# Patient Record
Sex: MALE | Race: BLACK OR AFRICAN AMERICAN | Employment: UNEMPLOYED | URBAN - METROPOLITAN AREA
[De-identification: names, ages, dates, MRNs, and addresses within clinical notes are randomized per-mention and may not be internally consistent; named-entity substitution may affect disease eponyms.]

---

## 2019-12-25 ENCOUNTER — HOSPITAL ENCOUNTER (INPATIENT)
Age: 29
LOS: 6 days | Discharge: HOME | DRG: 751 | End: 2019-12-31
Attending: EMERGENCY MEDICINE | Admitting: PSYCHIATRY & NEUROLOGY
Payer: MEDICAID

## 2019-12-25 PROBLEM — F32.9 DEPRESSION, MAJOR, SINGLE EPISODE: Status: ACTIVE | Noted: 2019-12-25

## 2019-12-25 PROBLEM — F33.3 SEVERE EPISODE OF RECURRENT MAJOR DEPRESSIVE DISORDER, WITH PSYCHOTIC FEATURES (HCC): Status: ACTIVE | Noted: 2019-12-25

## 2019-12-25 LAB
ACETAMINOPHEN LEVEL: <5 MCG/ML (ref 10–30)
ALBUMIN SERPL-MCNC: 3.7 G/DL (ref 3.5–5.2)
ALP BLD-CCNC: 70 U/L (ref 40–129)
ALT SERPL-CCNC: 20 U/L (ref 0–40)
AMPHETAMINE SCREEN, URINE: POSITIVE
ANION GAP SERPL CALCULATED.3IONS-SCNC: 14 MMOL/L (ref 7–16)
AST SERPL-CCNC: 21 U/L (ref 0–39)
BARBITURATE SCREEN URINE: NOT DETECTED
BASOPHILS ABSOLUTE: 0.04 E9/L (ref 0–0.2)
BASOPHILS RELATIVE PERCENT: 0.4 % (ref 0–2)
BENZODIAZEPINE SCREEN, URINE: NOT DETECTED
BILIRUB SERPL-MCNC: <0.2 MG/DL (ref 0–1.2)
BILIRUBIN URINE: NEGATIVE
BLOOD, URINE: NEGATIVE
BUN BLDV-MCNC: 8 MG/DL (ref 6–20)
CALCIUM SERPL-MCNC: 9.3 MG/DL (ref 8.6–10.2)
CANNABINOID SCREEN URINE: POSITIVE
CHLORIDE BLD-SCNC: 98 MMOL/L (ref 98–107)
CLARITY: CLEAR
CO2: 25 MMOL/L (ref 22–29)
COCAINE METABOLITE SCREEN URINE: NOT DETECTED
COLOR: YELLOW
CREAT SERPL-MCNC: 0.9 MG/DL (ref 0.7–1.2)
EOSINOPHILS ABSOLUTE: 0.2 E9/L (ref 0.05–0.5)
EOSINOPHILS RELATIVE PERCENT: 2 % (ref 0–6)
ETHANOL: <10 MG/DL (ref 0–0.08)
FENTANYL SCREEN, URINE: NOT DETECTED
GFR AFRICAN AMERICAN: >60
GFR NON-AFRICAN AMERICAN: >60 ML/MIN/1.73
GLUCOSE BLD-MCNC: 112 MG/DL (ref 74–99)
GLUCOSE URINE: NEGATIVE MG/DL
HCT VFR BLD CALC: 43.4 % (ref 37–54)
HEMOGLOBIN: 14 G/DL (ref 12.5–16.5)
IMMATURE GRANULOCYTES #: 0.06 E9/L
IMMATURE GRANULOCYTES %: 0.6 % (ref 0–5)
KETONES, URINE: NEGATIVE MG/DL
LEUKOCYTE ESTERASE, URINE: NEGATIVE
LYMPHOCYTES ABSOLUTE: 2.75 E9/L (ref 1.5–4)
LYMPHOCYTES RELATIVE PERCENT: 27 % (ref 20–42)
Lab: ABNORMAL
MCH RBC QN AUTO: 31.2 PG (ref 26–35)
MCHC RBC AUTO-ENTMCNC: 32.3 % (ref 32–34.5)
MCV RBC AUTO: 96.7 FL (ref 80–99.9)
METHADONE SCREEN, URINE: NOT DETECTED
MONOCYTES ABSOLUTE: 0.76 E9/L (ref 0.1–0.95)
MONOCYTES RELATIVE PERCENT: 7.5 % (ref 2–12)
NEUTROPHILS ABSOLUTE: 6.37 E9/L (ref 1.8–7.3)
NEUTROPHILS RELATIVE PERCENT: 62.5 % (ref 43–80)
NITRITE, URINE: NEGATIVE
OPIATE SCREEN URINE: NOT DETECTED
OXYCODONE URINE: NOT DETECTED
PDW BLD-RTO: 14 FL (ref 11.5–15)
PH UA: 7.5 (ref 5–9)
PHENCYCLIDINE SCREEN URINE: NOT DETECTED
PLATELET # BLD: 314 E9/L (ref 130–450)
PMV BLD AUTO: 9.2 FL (ref 7–12)
POTASSIUM SERPL-SCNC: 3.7 MMOL/L (ref 3.5–5)
PROTEIN UA: NEGATIVE MG/DL
RBC # BLD: 4.49 E12/L (ref 3.8–5.8)
SALICYLATE, SERUM: <0.3 MG/DL (ref 0–30)
SODIUM BLD-SCNC: 137 MMOL/L (ref 132–146)
SPECIFIC GRAVITY UA: 1.01 (ref 1–1.03)
TOTAL PROTEIN: 7.8 G/DL (ref 6.4–8.3)
TRICYCLIC ANTIDEPRESSANTS SCREEN SERUM: NEGATIVE NG/ML
UROBILINOGEN, URINE: 1 E.U./DL
WBC # BLD: 10.2 E9/L (ref 4.5–11.5)

## 2019-12-25 PROCEDURE — 36415 COLL VENOUS BLD VENIPUNCTURE: CPT

## 2019-12-25 PROCEDURE — 1240000000 HC EMOTIONAL WELLNESS R&B

## 2019-12-25 PROCEDURE — G0480 DRUG TEST DEF 1-7 CLASSES: HCPCS

## 2019-12-25 PROCEDURE — 85025 COMPLETE CBC W/AUTO DIFF WBC: CPT

## 2019-12-25 PROCEDURE — 80307 DRUG TEST PRSMV CHEM ANLYZR: CPT

## 2019-12-25 PROCEDURE — 6370000000 HC RX 637 (ALT 250 FOR IP): Performed by: PSYCHIATRY & NEUROLOGY

## 2019-12-25 PROCEDURE — 81003 URINALYSIS AUTO W/O SCOPE: CPT

## 2019-12-25 PROCEDURE — 80053 COMPREHEN METABOLIC PANEL: CPT

## 2019-12-25 PROCEDURE — 99222 1ST HOSP IP/OBS MODERATE 55: CPT | Performed by: NURSE PRACTITIONER

## 2019-12-25 PROCEDURE — 99284 EMERGENCY DEPT VISIT MOD MDM: CPT

## 2019-12-25 RX ORDER — HYDROXYZINE PAMOATE 50 MG/1
50 CAPSULE ORAL 3 TIMES DAILY PRN
Status: DISCONTINUED | OUTPATIENT
Start: 2019-12-25 | End: 2019-12-31 | Stop reason: HOSPADM

## 2019-12-25 RX ORDER — TRAZODONE HYDROCHLORIDE 50 MG/1
50 TABLET ORAL NIGHTLY PRN
Status: DISCONTINUED | OUTPATIENT
Start: 2019-12-25 | End: 2019-12-31 | Stop reason: HOSPADM

## 2019-12-25 RX ORDER — LORAZEPAM 1 MG/1
1 TABLET ORAL ONCE
Status: DISCONTINUED | OUTPATIENT
Start: 2019-12-25 | End: 2019-12-31 | Stop reason: HOSPADM

## 2019-12-25 RX ORDER — BENZTROPINE MESYLATE 1 MG/ML
2 INJECTION INTRAMUSCULAR; INTRAVENOUS 2 TIMES DAILY PRN
Status: DISCONTINUED | OUTPATIENT
Start: 2019-12-25 | End: 2019-12-31 | Stop reason: HOSPADM

## 2019-12-25 RX ORDER — NICOTINE 21 MG/24HR
1 PATCH, TRANSDERMAL 24 HOURS TRANSDERMAL DAILY
Status: DISCONTINUED | OUTPATIENT
Start: 2019-12-25 | End: 2019-12-31 | Stop reason: HOSPADM

## 2019-12-25 RX ORDER — ACETAMINOPHEN 325 MG/1
650 TABLET ORAL EVERY 4 HOURS PRN
Status: DISCONTINUED | OUTPATIENT
Start: 2019-12-25 | End: 2019-12-31 | Stop reason: HOSPADM

## 2019-12-25 RX ORDER — OLANZAPINE 5 MG/1
5 TABLET ORAL EVERY 4 HOURS PRN
Status: DISCONTINUED | OUTPATIENT
Start: 2019-12-25 | End: 2019-12-31 | Stop reason: HOSPADM

## 2019-12-25 RX ORDER — MAGNESIUM HYDROXIDE/ALUMINUM HYDROXICE/SIMETHICONE 120; 1200; 1200 MG/30ML; MG/30ML; MG/30ML
30 SUSPENSION ORAL PRN
Status: DISCONTINUED | OUTPATIENT
Start: 2019-12-25 | End: 2019-12-31 | Stop reason: HOSPADM

## 2019-12-25 RX ORDER — OLANZAPINE 10 MG/1
10 INJECTION, POWDER, LYOPHILIZED, FOR SOLUTION INTRAMUSCULAR EVERY 4 HOURS PRN
Status: DISCONTINUED | OUTPATIENT
Start: 2019-12-25 | End: 2019-12-31 | Stop reason: HOSPADM

## 2019-12-25 RX ADMIN — OLANZAPINE 5 MG: 5 TABLET, FILM COATED ORAL at 09:32

## 2019-12-25 SDOH — HEALTH STABILITY: MENTAL HEALTH: HOW OFTEN DO YOU HAVE A DRINK CONTAINING ALCOHOL?: NEVER

## 2019-12-25 ASSESSMENT — LIFESTYLE VARIABLES: HISTORY_ALCOHOL_USE: NO

## 2019-12-25 ASSESSMENT — SLEEP AND FATIGUE QUESTIONNAIRES
DIFFICULTY ARISING: NO
SLEEP PATTERN: INSOMNIA
DO YOU USE A SLEEP AID: NO
DIFFICULTY FALLING ASLEEP: YES
DO YOU HAVE DIFFICULTY SLEEPING: YES
DIFFICULTY STAYING ASLEEP: YES
RESTFUL SLEEP: NO
AVERAGE NUMBER OF SLEEP HOURS: 4

## 2019-12-25 ASSESSMENT — PAIN DESCRIPTION - LOCATION: LOCATION: FACE

## 2019-12-25 ASSESSMENT — PAIN SCALES - GENERAL: PAINLEVEL_OUTOF10: 0

## 2019-12-25 ASSESSMENT — PATIENT HEALTH QUESTIONNAIRE - PHQ9: SUM OF ALL RESPONSES TO PHQ QUESTIONS 1-9: 5

## 2019-12-25 NOTE — PLAN OF CARE
Problem: Altered Mood, Psychotic Behavior:  Goal: Able to demonstrate trust by eating, participating in treatment and following staff's direction  Description  Able to demonstrate trust by eating, participating in treatment and following staff's direction  Outcome: Ongoing  Goal: Able to verbalize decrease in frequency and intensity of hallucinations  Description  Able to verbalize decrease in frequency and intensity of hallucinations  Outcome: Ongoing  Goal: Able to verbalize reality based thinking  Description  Able to verbalize reality based thinking  Outcome: Ongoing  Goal: Absence of self-harm  Description  Absence of self-harm  Outcome: Ongoing  Goal: Ability to achieve adequate nutritional intake will improve  Description  Ability to achieve adequate nutritional intake will improve  Outcome: Ongoing  Goal: Ability to interact with others will improve  Description  Ability to interact with others will improve  Outcome: Ongoing  Goal: Compliance with prescribed medication regimen will improve  Description  Compliance with prescribed medication regimen will improve  Outcome: Ongoing  Goal: Patient specific goal  Description  Patient specific goal  Outcome: Ongoing

## 2019-12-25 NOTE — ED NOTES
Patient is accepted to 7S, room 7530 by Dr. Dequan Villarreal. Mily in admitting notified.       Cathy Saeed RN  12/25/19 0022

## 2019-12-25 NOTE — ED TRIAGE NOTES
Patient denies SI/HI, just feels like he can't move forward with his life, stated he needs someone to talk to, feeling depressed.

## 2019-12-25 NOTE — PROGRESS NOTES
Recognizing danger situations (included triggers and roadblocks)                    ( )  Coping skills (new ways to manage stress, exercise, relaxation techniques, changing routine, distraction)                                                           ( )  Basic information about quitting (benefits of quitting, techniques in how to quit, available resources  ( ) Referral for counseling faxed to [facility]                                           ( ) Patient refused counseling  ( x) Patient has not smoked in the last 30 days    Metabolic Screening:    No results found for: LABA1C    No results found for: CHOL  No results found for: TRIG  No results found for: HDL  No components found for: LDLCAL  No results found for: LABVLDL      Body mass index is 25.11 kg/m². BP Readings from Last 2 Encounters:   12/25/19 121/80           Pt admitted with followings belongings:  Dentures: None  Vision - Corrective Lenses: None  Hearing Aid: None  Jewelry: None  Body Piercings Removed: N/A     Valuables sent home with placed in locker. Valuables placed in safe in security envelope, number:  locker. Patient's home medications were placed in locked medication room. ( [id number]) Patient oriented to surroundings and program expectations and copy of patient rights given. Received admission packet:  yes. Consents reviewed, signed yes. Refused no. Patient verbalize understanding:  yes. Patient education on precautions: yes      Patient admitted from [facility] depressed and anxious, worried, tearful, and sad. Reports arriving in [place] Friday from [facility] where his mother lives to see some friends. States he went to the rescue mission and was \"not treated very well\". Patient abused meth and states \"the voices in my head tell me who to watch out for people that's around me\".  Patient is paranoid and believes his HIV is visible to others, points to the acne on his right cheek and states \"its my disease coming out on my

## 2019-12-25 NOTE — PROGRESS NOTES
[facility]  Initial Interdisciplinary Treatment Plan NOTE    Review Date & Time: 12/25/2019 1100    Patient was in treatment team    Admission Type:   Admission Type: Voluntary    Reason for admission:  Reason for Admission: \"I moved here Friday from [facility], I was treated horribly at [employer], I feel like everyone is against me, I feel sad and like giving up\"      Estimated Length of Stay Update:  3-5 days  Estimated Discharge Date Update: 12/28/2019    PATIENT STRENGTHS:  Patient Strengths Strengths: Positive Support, Communication  Patient Strengths and Limitations:Limitations: Tendency to isolate self, Lacks leisure interests, Apathetic / unmotivated, Inappropriate/potentially harmful leisure interests, Hopeless about future  Addictive Behavior:Addictive Behavior  In the past 3 months, have you felt or has someone told you that you have a problem with:  : None  Do you have a history of Chemical Use?: Yes  Do you have a history of Alcohol Use?: No  Do you have a history of Street Drug Abuse?: Yes  Histroy of Prescripton Drug Abuse?: No  Medical Problems:  Past Medical History:   Diagnosis Date    [facility]        EDUCATION:   Learner Progress Toward Treatment Goals: Reviewed group plan and strategies    Method: Small group    Outcome: Verbalized understanding    PATIENT GOALS: medication compliance and group therapy attendance    PLAN/TREATMENT RECOMMENDATIONS UPDATE:medication compliance and group therapy attendance     GOALS UPDATE:   Time frame for Short-Term Goals: 3-5 days    Ramses Ortega RN

## 2019-12-25 NOTE — ED NOTES
Bed: [facility]  Expected date:   Expected time:   Means of arrival:   Comments:  triage     Reza Porter RN  12/25/19 7455

## 2019-12-25 NOTE — PROGRESS NOTES
Neelam Nicholson was ordered [facility] which is a nonformulary medication. The patient has indicated that the home supply of this medication will be brought in to the hospital for inpatient use. If the medication has not been administered by 1400 on the following day from the time the order was placed, a pharmacist will follow-up with the nurse of the patient to assess the capability of the patient to bring in the medication. If it is determined that the patient cannot supply the medication and it is not available to be dispensed from the pharmacy, a call will be placed to the ordering provider to discuss alternative options.       Kaushal Scanlon, Leonid 12/25/2019 11:14 AM

## 2019-12-25 NOTE — H&P
Cognition:      [x] Alert  [x] Awake  [x] Oriented  [x] Person  [x] Place [x] Time      [] drowsy  [] tired  [] lethargic  [] distractable     Attention/Concentration:   [x] Attentive  [] Distracted        Memory Recent and Remote: [x] Intact   [] Impaired [] Partially Impaired     Language: [x] Able to recognize and name objects          [] Unable to recognize and name Objects    Fund of Knowledge:  [] Poor [x]  Fair  [] Good    Speech: [] Normal  [x] Soft  [] Slow  [] Fast [] Pressured            [] Loud [] Dysarthria  [] Incoherent       Appearance: [] Well Groomed  [] Casual Dressed  [] Unkept  [x] Disheveled          [x] Normal weight  [] Thin  [] Overweight  [] Obese           Attitude: [] Positive  [] Hostile  [] Demanding  [] Guarded  [] Defensive         [x] Cooperative  []  Uncooperative      Behavior:  [x] Normal Gait  [] Abnormal Gait [] Walks with Assistance  [] Alleen Ruts     [] Walks with Orpha Close  [] In Hospital Bed  [] Sitting in Chair    Muscle-Skeletal:  [x] Normal Muscle Tone [] Muscle Atrophy            [] Abnormal Muscle Movement     Eye Contact:  [x] Good eye contact  [] Intermittent Eye Contact  [] Poor Eye Contact    [] Excessive Eye Contact   [] Intrusive Eye Contact    Mood: [x] Depressed  [x] Anxious  [] Irritated  [] Euthymic   [] Angry [] Restless                   [] Apathetic    Affect:  [x] Congruent  [] Incongruent  [] Labile  [] Constricted  [x] Flat  [] Bizarre                     [] Heightened    [] Exaggerated      Thought Process and Association:  [] Logical [] Illogical       [x] Linear and Goal Directed  [] Tangential  [] Circumstantial     Thought Content:  [] Denies [x] Endorses [x] Suicidal [] Homicidal  [] Delusional      [x] Paranoid  [] Somatic  [] Grandiose    Perception: []  None  [x] Auditory   [x] Visual  [] tactile   [] olfactory  [] Illusions         Insight: [] Intact  [] Fair  [x] Limited    Judgement:  [] Intact  [] Fair  [x] Limited ASSESSMENT    Patient Active Problem List   Diagnosis    Depression, major, single episode     Recommendations and plan of treatment:  1- admit to inpatient unit  2- Unit milleiu   3- Medication Management  4- Group therapy and one on one. 5- Routine precautions    Start Depakote mood  Start Invega for psychosis    Signed:  Kt Chen  12/25/2019  7:55 AM      I saw and examined the patient and I agree with the above documentation.

## 2019-12-25 NOTE — ED PROVIDER NOTES
Department of Emergency Medicine   ED  Provider Note  Admit Date/RoomTime: 12/25/2019  2:47 AM  ED Room: [address] [place] [address]     HPI: Sandy Saeed 26 y.o. male with hx of HIV, presents with a complaint of being depressed with associated anxiety beginning a month ago. Complaint has been constant and became more severe today which is what prompted the visit. Pt states he has not been feeling like himself lately. He notes he recently moved to [facility] from [facility] and doesn't have a place to stay, no family / friends. Pt has been staying at the shelter and admits intermittent access to food / water / and adequate sleep. He reports experiencing A/V Hallucinations, voices telling him not to trust anybody. Pt admits to smoking meth earlier today. He notes he cant afford his medication, hasnt taken them in a while. Negative Suicidal ideation. Negative Homicidal ideation. No specific plan. Pt also denies fever, chills, CP, SOB, abd pain, N/V/D. Review of Systems:   Pertinent positives and negatives are stated within HPI, all other systems reviewed and are negative.    --------------------------------------------- PAST HISTORY ---------------------------------------------  Past Medical History:  has a past medical history of HIV (human immunodeficiency virus infection) ([facility] Past Surgical History:  has no past surgical history on file. Social History:      Family History: family history is not on file. The patients home medications have been reviewed. Allergies: Patient has no known allergies. -------------------------------------------------- RESULTS -------------------------------------------------  All laboratory and imaging studies were reviewed by myself.     LABS:  Results for orders placed or performed during the hospital encounter of 12/25/19   Comprehensive Metabolic Panel   Result Value Ref Range    Sodium 137 132 - 146 mmol/L    Potassium 3.7 3.5 - 5.0 mmol/L    Chloride 98 98 - 107 mmol/L CO2 25 22 - 29 mmol/L    Anion Gap 14 7 - 16 mmol/L    Glucose 112 (H) 74 - 99 mg/dL    BUN 8 6 - 20 mg/dL    CREATININE 0.9 0.7 - 1.2 mg/dL    GFR Non-African American >60 >=60 mL/min/1.73    GFR African American >60     Calcium 9.3 8.6 - 10.2 mg/dL    Total Protein 7.8 6.4 - 8.3 g/dL    Alb 3.7 3.5 - 5.2 g/dL    Total Bilirubin <0.2 0.0 - 1.2 mg/dL    Alkaline Phosphatase 70 40 - 129 U/L    ALT 20 0 - 40 U/L    AST 21 0 - 39 U/L   CBC Auto Differential   Result Value Ref Range    WBC 10.2 4.5 - 11.5 E9/L    RBC 4.49 3.80 - 5.80 E12/L    Hemoglobin 14.0 12.5 - 16.5 g/dL    Hematocrit 43.4 37.0 - 54.0 %    MCV 96.7 80.0 - 99.9 fL    MCH 31.2 26.0 - 35.0 pg    MCHC 32.3 32.0 - 34.5 %    RDW 14.0 11.5 - 15.0 fL    Platelets 775 682 - 333 E9/L    MPV 9.2 7.0 - 12.0 fL    Neutrophils % 62.5 43.0 - 80.0 %    Immature Granulocytes % 0.6 0.0 - 5.0 %    Lymphocytes % 27.0 20.0 - 42.0 %    Monocytes % 7.5 2.0 - 12.0 %    Eosinophils % 2.0 0.0 - 6.0 %    Basophils % 0.4 0.0 - 2.0 %    Neutrophils Absolute 6.37 1.80 - 7.30 E9/L    Immature Granulocytes # 0.06 E9/L    Lymphocytes Absolute 2.75 1.50 - 4.00 E9/L    Monocytes Absolute 0.76 0.10 - 0.95 E9/L    Eosinophils Absolute 0.20 0.05 - 0.50 E9/L    Basophils Absolute 0.04 0.00 - 0.20 E9/L   Serum Drug Screen   Result Value Ref Range    Ethanol Lvl <10 mg/dL    Acetaminophen Level <5.0 (L) 10.0 - 93.8 mcg/mL    Salicylate, Serum <4.3 0.0 - 30.0 mg/dL    TCA Scrn NEGATIVE Cutoff:300 ng/mL   Urine Drug Screen   Result Value Ref Range    Amphetamine Screen, Urine POSITIVE (A) Negative <1000 ng/mL    Barbiturate Screen, Ur NOT DETECTED Negative < 200 ng/mL    Benzodiazepine Screen, Urine NOT DETECTED Negative < 200 ng/mL    Cannabinoid Scrn, Ur POSITIVE (A) Negative < 50ng/mL    Cocaine Metabolite Screen, Urine NOT DETECTED Negative < 300 ng/mL    Opiate Scrn, Ur NOT DETECTED Negative < 300ng/mL    PCP Screen, Urine NOT DETECTED Negative < 25 ng/mL    Methadone Screen, Urine NOT DETECTED Negative <300 ng/mL    Oxycodone Urine NOT DETECTED Negative <100 ng/mL    FENTANYL SCREEN, URINE NOT DETECTED Negative <1 ng/mL    Drug Screen Comment: see below    Urinalysis   Result Value Ref Range    Color, UA Yellow Straw/Yellow    Clarity, UA Clear Clear    Glucose, Ur Negative Negative mg/dL    Bilirubin Urine Negative Negative    Ketones, Urine Negative Negative mg/dL    Specific Gravity, UA 1.010 1.005 - 1.030    Blood, Urine Negative Negative    pH, UA 7.5 5.0 - 9.0    Protein, UA Negative Negative mg/dL    Urobilinogen, Urine 1.0 <2.0 E.U./dL    Nitrite, Urine Negative Negative    Leukocyte Esterase, Urine Negative Negative       RADIOLOGY:  Interpreted by Radiologist.  No orders to display       ------------------------- NURSING NOTES AND VITALS REVIEWED ---------------------------   The nursing notes within the ED encounter and vital signs as below have been reviewed. /85   Pulse 91   Temp 99 °F (37.2 °C) (Infrared)   Resp 12   Ht 5' 10\" (1.778 m)   Wt 175 lb (79.4 kg)   SpO2 97%   BMI 25.11 kg/m²   Oxygen Saturation Interpretation: Normal      ---------------------------------------------------PHYSICAL EXAM--------------------------------------    Constitutional/General: Alert and oriented x3, well appearing, non toxic in NAD  Head: Normocephalic, atraumatic  Eyes: PERRL, EOMI  Mouth: Oropharynx clear, handling secretions, no trismus  Neck: Supple, full ROM, non tender to palpation in the midline, no stridor, no crepitus, no meningeal signs  Pulmonary: Lungs clear to auscultation bilaterally, no wheezes, rales, or rhonchi. Not in respiratory distress  Cardiovascular:  Regular rate and regular rhythm, no murmurs, gallops, or rubs. 2+ distal pulses  Abdomen: Soft, non tender, non distended, +BS, no rebound, guarding, or rigidity. Extremities: Moves all extremities x 4. Warm and well perfused, no clubbing, cyanosis, or edema.  Capillary refill <3 seconds  Skin: warm and dry without rash  Neurologic: GCS 15, CN 2-12 grossly intact, no focal deficits  Psych: Tearful Affect, No HI / SI. Positive A/V Hallucinations     ------------------------------------------ PROGRESS NOTES ------------------------------------------     Medical decision making:   Pt has not been feeling like himself lately. Chintan Waldropffer He reports experiencing A/V Hallucinations, voices telling him not to trust anybody. Pt admits to smoking meth earlier today. Pt has been staying at the shelter and admits intermittent access to food / water / and adequate sleep. He notes he cant afford his medication, hasnt taken them in a while. No SI / HI. Pt will be evaluated and medically cleared before consultation with social work. Consultations:   Social work        Counseling: The emergency provider has spoken with thepatient and discussed todays results, in addition to providing specific details for the plan of care and counseling regarding the diagnosis and prognosis. Questions are answered at this time and they are agreeable with the plan.     --------------------------------- IMPRESSION AND DISPOSITION ---------------------------------    IMPRESSION  1. Depression, unspecified depression type    2. Anxiety state    3. Human immunodeficiency virus (HIV) disease ([facility]    4. Methamphetamine abuse ([facility]    5. Auditory hallucinations        DISPOSITION  Disposition: as per consultation   Patient condition is stable    12/25/19, 2:54 AM.    This note is prepared by Minda Liu, acting as Scribe for Tod Loera MD.    Tod Loera MD:  The scribe's documentation has been prepared under my direction and personally reviewed by me in its entirety. I confirm that the note above accurately reflects all work, treatment, procedures, and medical decision making performed by me.      Tod Loera MD  12/25/19 [id number]

## 2019-12-25 NOTE — ED NOTES
Patient is a 26year old AA male who presented to the ED as a walk in for a psychiatric evaluation. Patient reports he has been feeling paranoid and doesn't trust anyone for some time. Patient reports he moved here on Friday from [facility] to live with 2 friends and within 24 hours, they kicked him out d/t disagreements. Patient is tearful, depressed. He reports he has no support in the area, but doesn't want to move back because he feels this area will be more helpful with his mental health. Patient denies current SI. Reports previous attempt(s), would not disclose any further details. Patient denies HI. Reports auditory hallucinations of 2 people that are always negative, telling him not to trust anyone, etc.  Patient also reports intermittent visual hallucinations of shadows, but he doesn't think twice about them because they happen quickly. Patient reports a previous admission 2 years ago. He reports a history of Depression, Anxiety and ADHD. He has a family history of Schizophrenia. He has not been on any medications for 1.5 years. He reports meth use 8 hours ago. Patient reports not sleeping for 2 days. Good appetite. Patient reports increased anxiety. He reports difficulty keeping jobs/friends and staying focused. He reports he went to [facility] on the 23rd, but only sat in the lobby and couldn't get himself to go any further. They advised him to come back on the 26th, but patient isn't confident he will be able to do it then. Patient is agreeable to inpatient services, signed voluntary.        Teressa Larose RN  12/25/19 [id number]

## 2019-12-26 LAB
CHOLESTEROL, TOTAL: 208 MG/DL (ref 0–199)
HBA1C MFR BLD: 5.6 % (ref 4–5.6)
HDLC SERPL-MCNC: 45 MG/DL
LDL CHOLESTEROL CALCULATED: 130 MG/DL (ref 0–99)
TRIGL SERPL-MCNC: 164 MG/DL (ref 0–149)
VLDLC SERPL CALC-MCNC: 33 MG/DL

## 2019-12-26 PROCEDURE — 83036 HEMOGLOBIN GLYCOSYLATED A1C: CPT

## 2019-12-26 PROCEDURE — 6370000000 HC RX 637 (ALT 250 FOR IP): Performed by: NURSE PRACTITIONER

## 2019-12-26 PROCEDURE — 99231 SBSQ HOSP IP/OBS SF/LOW 25: CPT | Performed by: NURSE PRACTITIONER

## 2019-12-26 PROCEDURE — 1240000000 HC EMOTIONAL WELLNESS R&B

## 2019-12-26 PROCEDURE — 80061 LIPID PANEL: CPT

## 2019-12-26 PROCEDURE — 36415 COLL VENOUS BLD VENIPUNCTURE: CPT

## 2019-12-26 PROCEDURE — 6370000000 HC RX 637 (ALT 250 FOR IP): Performed by: PSYCHIATRY & NEUROLOGY

## 2019-12-26 RX ORDER — DIVALPROEX SODIUM 500 MG/1
500 TABLET, DELAYED RELEASE ORAL EVERY 12 HOURS SCHEDULED
Status: DISCONTINUED | OUTPATIENT
Start: 2019-12-26 | End: 2019-12-27

## 2019-12-26 RX ORDER — PALIPERIDONE 3 MG/1
3 TABLET, EXTENDED RELEASE ORAL DAILY
Status: DISCONTINUED | OUTPATIENT
Start: 2019-12-26 | End: 2019-12-30

## 2019-12-26 RX ADMIN — HYDROXYZINE PAMOATE 50 MG: 50 CAPSULE ORAL at 18:53

## 2019-12-26 RX ADMIN — DIVALPROEX SODIUM 500 MG: 250 TABLET, DELAYED RELEASE ORAL at 20:35

## 2019-12-26 RX ADMIN — PALIPERIDONE 3 MG: 3 TABLET, EXTENDED RELEASE ORAL at 12:52

## 2019-12-26 RX ADMIN — TRAZODONE HYDROCHLORIDE 50 MG: 50 TABLET ORAL at 20:35

## 2019-12-26 RX ADMIN — DIVALPROEX SODIUM 500 MG: 250 TABLET, DELAYED RELEASE ORAL at 12:52

## 2019-12-26 ASSESSMENT — SLEEP AND FATIGUE QUESTIONNAIRES
SLEEP PATTERN: INSOMNIA
DIFFICULTY STAYING ASLEEP: YES
DO YOU HAVE DIFFICULTY SLEEPING: YES
DIFFICULTY ARISING: NO
RESTFUL SLEEP: NO
DIFFICULTY FALLING ASLEEP: YES
AVERAGE NUMBER OF SLEEP HOURS: 4
DO YOU USE A SLEEP AID: NO

## 2019-12-26 ASSESSMENT — LIFESTYLE VARIABLES: HISTORY_ALCOHOL_USE: NO

## 2019-12-26 ASSESSMENT — PAIN SCALES - GENERAL
PAINLEVEL_OUTOF10: 0
PAINLEVEL_OUTOF10: 0

## 2019-12-26 ASSESSMENT — PATIENT HEALTH QUESTIONNAIRE - PHQ9: SUM OF ALL RESPONSES TO PHQ QUESTIONS 1-9: 5

## 2019-12-26 NOTE — PROGRESS NOTES
DATE OF SERVICE:     12/26/2019    Zainab Valles seen today for the purpose of continuation of care. Nursing, social work reports, laboratory studies and vital signs are reviewed. Patient chief complaint today is:             [x] Depression      [x] Anxiety        [] Psychosis         [] Suicidal/Homicidal                         [] Delusions           [] Aggression          Subjective: Today patient states that his moods are labile. Sleep:  [] Good [x] Fair  [] Poor  Appetite:  [] Good [x] Fair  [] Poor    Depression:  [] Mild [] Moderate [x] Severe                [x] Constant [] Sporadic     Anxiety: [] Mild [] Moderate [x] Severe    [x] Constant [] Sporadic     Delusions: [] Mild [] Moderate [] Severe     [] Constant [] Sporadic     [] Paranoid [] Somatic [] Grandiose     Hallucinations: [] Mild [] Moderate [] Severe     [] Constant [] Sporadic    [] Auditory  [] Visual [] Tactile       Suicidal: [] Constant [] Sporadic  Homicidal: [] Constant [] Sporadic    Unscheduled Medications     [] Patient Receiving Emergency Medications \" Chemical Restraint\"   [] Requesting PRN medications for anxiety    Medical Review of Systems:     All other than marked systmes have been reviewed and are all negative.     Constitutional Symptoms: []  fever []  Chills  Skin Symptoms: [] rash []  Pruritus   Eye Symptoms: [] Vision unchanged []  recent vision problems[] blurred vision   Respiratory Symptoms:[] shortness of breath [] cough  Cardiovascular Symptoms:  [] chest pain   [] palpitations   Gastrointestinal Symptoms: []  abdominal pain []  nausea []  vomiting []  diarrhea  Genitourinary Symptoms: []  dysuria  []  hematuria   Musculoskeletal Symptoms: []  back pain []  muscle pain []  joint pain  Neurologic Symptoms: []  headache []  dizziness  Hematolymphoid Symptoms: [] Adenopathy [] Bruises   [] Schimosis       Psychiatric Review of systems  Delusions:  [] Denies [] Endorses   Withdrawals:  [] Denies [] Endorses Hallucinations: [] Denies [] Endorses    Extra Pyramidal Symptoms: [] Denies [] Endorses      /61   Pulse 60   Temp 97.8 °F (36.6 °C) (Oral)   Resp 14   Ht 5' 10\" (1.778 m)   Wt 175 lb (79.4 kg)   SpO2 98%   BMI 25.11 kg/m²     Mental Status Examination:    Cognition:      [x] Alert  [x] Awake  [x] Oriented  [x] Person  [x] Place [x] Time      [] drowsy  [] tired  [] lethargic  [] distractable  [] Other    Attention/Concentration:   [x] Attentive  [] Distracted        Memory Recent and Remote: [x] Intact   [] Impaired [] Partially Impaired     Language: [x] Able to recognize and name objects          [] Unable to recognize and name Objects    Fund of Knowledge:  [] Poor [x]  Fair  [] Good    Speech: [] Normal  [x] Soft  [] Slow  [] Fast [] Pressured            [] Loud [] Dysarthria  [] Incoherent    Appearance: [] Well Groomed  [x] Casual Dressed  [] Unkept  [] Disheveled          [x] Normal weight[] Thin  [] Overweight  [] Obese           Attitude: [] Positive  [] Hostile  [] Demanding  [] Guarded  [] Defensive         [x] Cooperative  []  Uncooperative      Behavior:  [x] Normal Gait  [] Walks with Assistance  [] Terri Chair     [] Walks with [address]  [] In Hospital Bed  [] Sitting in Chair    Muscle-Skeletal:  [x] Normal Muscle Tone [] Muscle Atrophy       [] Abnormal Muscle Movement     Eye Contact:  [x] Good eye contact  [] Intermittent Eye Contact  [] Poor Eye Contact        [] Excessive Eye Contact   [] Intrusive    Mood: [x] Depressed  [x] Anxious  [] Irritated  [] Euthymic   [] Angry [] Restless                    [] Apathetic    Affect:  [x] Congruent  [] Incongruent  [x] Labile  [] Constricted  [x] Flat  [] Bizarre                     [] Heightened  [] Exaggerated      Thought Process and Association:  [] Logical [] Illogical       [x] Linear and Goal Directed  [] Tangential  [x] Circumstantial     Thought Content:  [x] Denies [] Endorses [] Suicidal [] Homicidal  [] Delusional      []

## 2019-12-26 NOTE — PLAN OF CARE
Patient denies SI, HI and hallucinations. Reports that he is no longer hearing voices. Presents calm and cooperative. Isolates to room at times. Medications taken without issue. No complaints or concerns voiced at this time. No unit problems reported. Will continue to observe and support.      Problem: Altered Mood, Psychotic Behavior:  Goal: Able to demonstrate trust by eating, participating in treatment and following staff's direction  Description  Able to demonstrate trust by eating, participating in treatment and following staff's direction  Outcome: Met This Shift  Goal: Able to verbalize decrease in frequency and intensity of hallucinations  Description  Able to verbalize decrease in frequency and intensity of hallucinations  Outcome: Met This Shift

## 2019-12-27 PROCEDURE — 1240000000 HC EMOTIONAL WELLNESS R&B

## 2019-12-27 PROCEDURE — 6370000000 HC RX 637 (ALT 250 FOR IP): Performed by: NURSE PRACTITIONER

## 2019-12-27 PROCEDURE — 99232 SBSQ HOSP IP/OBS MODERATE 35: CPT | Performed by: NURSE PRACTITIONER

## 2019-12-27 RX ORDER — DIVALPROEX SODIUM 500 MG/1
1000 TABLET, DELAYED RELEASE ORAL NIGHTLY
Status: DISCONTINUED | OUTPATIENT
Start: 2019-12-27 | End: 2019-12-30

## 2019-12-27 RX ADMIN — DIVALPROEX SODIUM 500 MG: 250 TABLET, DELAYED RELEASE ORAL at 09:31

## 2019-12-27 RX ADMIN — DIVALPROEX SODIUM 1000 MG: 500 TABLET, DELAYED RELEASE ORAL at 20:45

## 2019-12-27 RX ADMIN — PALIPERIDONE 3 MG: 3 TABLET, EXTENDED RELEASE ORAL at 09:31

## 2019-12-27 ASSESSMENT — PAIN SCALES - GENERAL: PAINLEVEL_OUTOF10: 0

## 2019-12-27 NOTE — PLAN OF CARE
Problem: Altered Mood, Psychotic Behavior:  Goal: Able to verbalize decrease in frequency and intensity of hallucinations  Description  Able to verbalize decrease in frequency and intensity of hallucinations  Outcome: Met This Shift     Problem: Altered Mood, Psychotic Behavior:  Goal: Able to verbalize reality based thinking  Description  Able to verbalize reality based thinking  Outcome: Ongoing  Goal: Ability to achieve adequate nutritional intake will improve  Description  Ability to achieve adequate nutritional intake will improve  Outcome: Ongoing     Tristane denies any SI, HI, or any hallucinations. Groups offered and encouraged.

## 2019-12-27 NOTE — PROGRESS NOTES
Symptoms: [] Adenopathy [] Bruises   [] Schimosis       Psychiatric Review of systems  Delusions:  [] Denies [] Endorses   Withdrawals:  [] Denies [] Endorses    Hallucinations: [] Denies [] Endorses    Extra Pyramidal Symptoms: [] Denies [] Endorses      BP (!) 103/57   Pulse 76   Temp 98.2 °F (36.8 °C) (Temporal)   Resp 15   Ht 5' 10\" (1.778 m)   Wt 175 lb (79.4 kg)   SpO2 98%   BMI 25.11 kg/m²     Mental Status Examination:    Cognition:      [x] Alert  [x] Awake  [x] Oriented  [x] Person  [x] Place [x] Time      [] drowsy  [] tired  [] lethargic  [] distractable  [] Other    Attention/Concentration:   [x] Attentive  [] Distracted        Memory Recent and Remote: [x] Intact   [] Impaired [] Partially Impaired     Language: [x] Able to recognize and name objects          [] Unable to recognize and name Objects    Fund of Knowledge:  [] Poor [x]  Fair  [] Good    Speech: [x] Normal  [] Soft  [] Slow  [] Fast [] Pressured            [] Loud [] Dysarthria  [] Incoherent    Appearance: [] Well Groomed  [] Casual Dressed  [] Unkept  [] Disheveled          [x] Normal weight[] Thin  [] Overweight  [] Obese           Attitude: [] Positive  [] Hostile  [] Demanding  [] Guarded  [] Defensive         [x] Cooperative  []  Uncooperative      Behavior:  [x] Normal Gait  [] Walks with Assistance  [] Terri Chair     [] Walks with Zabrina English  [] In Hospital Bed  [] Sitting in Chair    Muscle-Skeletal:  [x] Normal Muscle Tone [] Muscle Atrophy       [] Abnormal Muscle Movement     Eye Contact:  [x] Good eye contact  [] Intermittent Eye Contact  [] Poor Eye Contact        [] Excessive Eye Contact   [] Intrusive    Mood: [] Depressed  [] Anxious  [] Irritated  [x] Euthymic   [] Angry [] Restless                    [] Apathetic    Affect:  [x] Congruent  [] Incongruent  [] Labile  [] Constricted  [] Flat  [x] Bizarre                     [] Heightened  [] Exaggerated      Thought Process and Association:  [] Logical [] Illogical

## 2019-12-27 NOTE — PLAN OF CARE
Patient denies SI, HI and hallucinations. Reports that he is feeling better. Presents calm and cooperative. Patient is out on unit and is social with peers at times. Medications taken without issue. No complaints or concerns voiced at this time. No unit problems reported. Will continue to observe and support.      Problem: Altered Mood, Psychotic Behavior:  Goal: Able to verbalize decrease in frequency and intensity of hallucinations  Description  Able to verbalize decrease in frequency and intensity of hallucinations  Outcome: Ongoing  Goal: Able to verbalize reality based thinking  Description  Able to verbalize reality based thinking  Outcome: Ongoing

## 2019-12-28 LAB — VALPROIC ACID LEVEL: 60 MCG/ML (ref 50–100)

## 2019-12-28 PROCEDURE — 80164 ASSAY DIPROPYLACETIC ACD TOT: CPT

## 2019-12-28 PROCEDURE — 6370000000 HC RX 637 (ALT 250 FOR IP): Performed by: PSYCHIATRY & NEUROLOGY

## 2019-12-28 PROCEDURE — 1240000000 HC EMOTIONAL WELLNESS R&B

## 2019-12-28 PROCEDURE — 6370000000 HC RX 637 (ALT 250 FOR IP): Performed by: NURSE PRACTITIONER

## 2019-12-28 PROCEDURE — 36415 COLL VENOUS BLD VENIPUNCTURE: CPT

## 2019-12-28 PROCEDURE — 99232 SBSQ HOSP IP/OBS MODERATE 35: CPT | Performed by: NURSE PRACTITIONER

## 2019-12-28 RX ADMIN — PALIPERIDONE 3 MG: 3 TABLET, EXTENDED RELEASE ORAL at 09:02

## 2019-12-28 RX ADMIN — TRAZODONE HYDROCHLORIDE 50 MG: 50 TABLET ORAL at 20:40

## 2019-12-28 RX ADMIN — HYDROXYZINE PAMOATE 50 MG: 50 CAPSULE ORAL at 20:40

## 2019-12-28 RX ADMIN — DIVALPROEX SODIUM 1000 MG: 500 TABLET, DELAYED RELEASE ORAL at 20:39

## 2019-12-28 ASSESSMENT — PAIN SCALES - GENERAL
PAINLEVEL_OUTOF10: 0

## 2019-12-28 NOTE — PROGRESS NOTES
DATE OF SERVICE:     12/28/2019    Darinel Whitten seen today for the purpose of continuation of care. Nursing, social work reports, laboratory studies and vital signs are reviewed. Patient chief complaint today is:             [x] Depression      [x] Anxiety        [] Psychosis         [] Suicidal/Homicidal                         [] Delusions           [] Aggression          Subjective: Today patient states that he slept well. States that he is not hearing voices or having any paranoia. States moods are more even and restlessness is better. Wants rehab med compliant but no groups. Sleep:  [x] Good [] Fair  [] Poor  Appetite:  [x] Good [] Fair  [] Poor    Depression:  [] Mild [x] Moderate [] Severe                [x] Constant [] Sporadic     Anxiety: [] Mild [x] Moderate [] Severe    [x] Constant [] Sporadic     Delusions: [] Mild [] Moderate [] Severe     [] Constant [] Sporadic     [] Paranoid [] Somatic [] Grandiose     Hallucinations: [] Mild [] Moderate [] Severe     [] Constant [] Sporadic    [] Auditory  [] Visual [] Tactile       Suicidal: [] Constant [] Sporadic  Homicidal: [] Constant [] Sporadic    Unscheduled Medications     [] Patient Receiving Emergency Medications \" Chemical Restraint\"   [] Requesting PRN medications for anxiety    Medical Review of Systems:     All other than marked systmes have been reviewed and are all negative.     Constitutional Symptoms: []  fever []  Chills  Skin Symptoms: [] rash []  Pruritus   Eye Symptoms: [] Vision unchanged []  recent vision problems[] blurred vision   Respiratory Symptoms:[] shortness of breath [] cough  Cardiovascular Symptoms:  [] chest pain   [] palpitations   Gastrointestinal Symptoms: []  abdominal pain []  nausea []  vomiting []  diarrhea  Genitourinary Symptoms: []  dysuria  []  hematuria   Musculoskeletal Symptoms: []  back pain []  muscle pain []  joint pain  Neurologic Symptoms: []  headache []  dizziness  Hematolymphoid Symptoms: Goal Directed  [] Tangential  [] Circumstantial     Thought Content:  [x] Denies [] Endorses [] Suicidal [] Homicidal  [] Delusional      [] Paranoid  [] Somatic  [] Grandiose    Perception: [x]  None  [] Auditory   [] Visual  [] tactile   [] olfactory  [] Illusions         Insight: [] Intact  [] Fair  [x] Limited    Judgement:  [] Intact  [] Fair  [x] Limited      Assessment/Plan:        Patient Active Problem List   Diagnosis Code    Depression, major, single episode F32.9    Severe episode of recurrent major depressive disorder, with psychotic features ([facility] [address] F33.3         Plan:    []  Patient is refusing medications  [x] Improving as expected   [] Not improving as expected   [] Worsening    []  At Baseline     Continue current treatment  Depakote level in AM      Reason for more than one antipsychotic:  [x] N/A  [] 3 failed monotherapy(drugs tried):  [] Cross over to a new antipsychotic  [] Taper to monotherapy from polypharmacy  [] Augmentation of Clozapine therapy due to treatment resistance to single therapy      Signed:  Tanya Toscano  12/28/2019  11:20 AM

## 2019-12-28 NOTE — GROUP NOTE
Group Therapy Note    Date: 12/28/2019    Group Start Time: 1115  Group End Time: 5390  Group Topic: Cognitive Skills    [facility]    ANALILIA Morin        Group Therapy Note    Attendees: 14         Patient's Goal:  Pt will be able to identify coping skills to use upon d/c. Notes:  Pt participated in class discussion and activity. Status After Intervention:  Improved    Participation Level:  Active Listener and Interactive    Participation Quality: Appropriate, Attentive, Sharing and Supportive      Speech:  normal      Thought Process/Content: Logical      Affective Functioning: Congruent      Mood: euthymic      Level of consciousness:  Alert, Oriented x4 and Attentive      Response to Learning: Able to verbalize current knowledge/experience, Able to verbalize/acknowledge new learning and Progressing to goal      Endings: None Reported    Modes of Intervention: Education, Support, Socialization and Problem-solving      Discipline Responsible: /Counselor      Signature:  ANALILIA lFores

## 2019-12-29 PROCEDURE — 99232 SBSQ HOSP IP/OBS MODERATE 35: CPT | Performed by: NURSE PRACTITIONER

## 2019-12-29 PROCEDURE — 6370000000 HC RX 637 (ALT 250 FOR IP): Performed by: PSYCHIATRY & NEUROLOGY

## 2019-12-29 PROCEDURE — 6370000000 HC RX 637 (ALT 250 FOR IP): Performed by: NURSE PRACTITIONER

## 2019-12-29 PROCEDURE — 1240000000 HC EMOTIONAL WELLNESS R&B

## 2019-12-29 RX ADMIN — DIVALPROEX SODIUM 1000 MG: 500 TABLET, DELAYED RELEASE ORAL at 21:29

## 2019-12-29 RX ADMIN — ACETAMINOPHEN 650 MG: 325 TABLET, FILM COATED ORAL at 09:24

## 2019-12-29 RX ADMIN — TRAZODONE HYDROCHLORIDE 50 MG: 50 TABLET ORAL at 21:29

## 2019-12-29 RX ADMIN — PALIPERIDONE 3 MG: 3 TABLET, EXTENDED RELEASE ORAL at 08:26

## 2019-12-29 RX ADMIN — HYDROXYZINE PAMOATE 50 MG: 50 CAPSULE ORAL at 18:48

## 2019-12-29 ASSESSMENT — PAIN SCALES - GENERAL
PAINLEVEL_OUTOF10: 0
PAINLEVEL_OUTOF10: 7

## 2019-12-29 NOTE — PLAN OF CARE
Patient reports he feels \"much better than when I came in\". He reports being tired more than normal, he is aware this could be a temporary effect of the medications. Patient has been \"catching up\" on his sleep throughout the day. He reports his day has been \"real good\", he is hopeful of discharge tomorrow. Patient is active on the unit; bright, friendly and sociable with staff and peers. Calm and cooperative with staff and care, patient is taking prescribed medications, is attending groups, and reports no disturbance to appetite. Patient is appropriately groomed and attired; gait is steady and patient is independent with ADLs. Denies thoughts or intent to harm self or others at this time. Denies audio or visual hallucinations at this time. Reports no physical or emotional concerns or complaints, no signs or symptoms of distress or discomfort. Able to maintain control of behaviors and emotions. Patient is encouraged to continue to work towards discharge goal by complying with medications, attending groups and to seek staff if feelings are overwhelming. Environmental rounds completed per unit policy to maintain safety of everyone on the unit. Staff will offer support and interventions as requested or required. Problem: Altered Mood, Psychotic Behavior:  Goal: Able to demonstrate trust by eating, participating in treatment and following staff's direction  Description  Able to demonstrate trust by eating, participating in treatment and following staff's direction  Outcome: Met This Shift  Goal: Able to verbalize reality based thinking  Description  Able to verbalize reality based thinking  Outcome: Met This Shift  Goal: Ability to interact with others will improve  Description  Ability to interact with others will improve  Outcome: Met This Shift  Goal: Compliance with prescribed medication regimen will improve  Description  Compliance with prescribed medication regimen will improve  Outcome:  Met

## 2019-12-29 NOTE — PROGRESS NOTES
headache []  dizziness  Hematolymphoid Symptoms: [] Adenopathy [] Bruises   [] Schimosis       Psychiatric Review of systems  Delusions:  [] Denies [] Endorses   Withdrawals:  [] Denies [] Endorses    Hallucinations: [] Denies [] Endorses    Extra Pyramidal Symptoms: [] Denies [] Endorses      /60   Pulse 79   Temp 98.1 °F (36.7 °C) (Oral)   Resp 12   Ht 5' 10\" (1.778 m)   Wt 175 lb (79.4 kg)   SpO2 98%   BMI 25.11 kg/m²     Mental Status Examination:    Cognition:      [x] Alert  [x] Awake  [x] Oriented  [x] Person  [x] Place [x] Time      [] drowsy  [] tired  [] lethargic  [] distractable  [] Other    Attention/Concentration:   [x] Attentive  [] Distracted        Memory Recent and Remote: [x] Intact   [] Impaired [] Partially Impaired     Language: [x] Able to recognize and name objects          [] Unable to recognize and name Objects    Fund of Knowledge:  [] Poor [x]  Fair  [] Good    Speech: [x] Normal  [] Soft  [] Slow  [] Fast [] Pressured            [] Loud [] Dysarthria  [] Incoherent    Appearance: [] Well Groomed  [x] Casual Dressed  [] Unkept  [] Disheveled          [x] Normal weight[] Thin  [] Overweight  [] Obese           Attitude: [] Positive  [] Hostile  [] Demanding  [] Guarded  [] Defensive         [x] Cooperative  []  Uncooperative   0 Behavior:  [x] Normal Gait  [] Walks with Assistance  [] Terri Chair     [] Walks with Zabrina Mellrodney  [] In Hospital Bed  [] Sitting in Chair    Muscle-Skeletal:  [x] Normal Muscle Tone [] Muscle Atrophy       [] Abnormal Muscle Movement     Eye Contact:  [x] Good eye contact  [] Intermittent Eye Contact  [] Poor Eye Contact        [] Excessive Eye Contact   [] Intrusive    Mood: [] Depressed  [] Anxious  [] Irritated  [x] Euthymic   [] Angry [] Restless                    [] Apathetic    Affect:  [x] Congruent  [] Incongruent  [] Labile  [] Constricted  [] Flat  [] Bizarre                     [] Heightened  [] Exaggerated      Thought Process and Association: [] Logical [] Illogical       [x] Linear and Goal Directed  [] Tangential  [] Circumstantial     Thought Content:  [x] Denies [] Endorses [] Suicidal [] Homicidal  [] Delusional      [] Paranoid  [] Somatic  [] Grandiose    Perception: [x]  None  [] Auditory   [] Visual  [] tactile   [] olfactory  [] Illusions         Insight: [] Intact  [] Fair  [x] Limited    Judgement:  [] Intact  [] Fair  [x] Limited      Assessment/Plan:        Patient Active Problem List   Diagnosis Code    Depression, major, single episode F32.9    Severe episode of recurrent major depressive disorder, with psychotic features ([facility] [address] F33.3         Plan:    []  Patient is refusing medications  [x] Improving as expected   [] Not improving as expected   [] Worsening    []  At Baseline     Continue current treatment  Educate on risks of drugs and worsening mental illness and death    Reason for more than one antipsychotic:  [x] N/A  [] 3 failed monotherapy(drugs tried):  [] Cross over to a new antipsychotic  [] Taper to monotherapy from polypharmacy  [] Augmentation of Clozapine therapy due to treatment resistance to single therapy      Signed:  Yasmin Toscano  12/29/2019  10:21 AM

## 2019-12-30 PROCEDURE — 1240000000 HC EMOTIONAL WELLNESS R&B

## 2019-12-30 PROCEDURE — 6370000000 HC RX 637 (ALT 250 FOR IP): Performed by: NURSE PRACTITIONER

## 2019-12-30 PROCEDURE — 99232 SBSQ HOSP IP/OBS MODERATE 35: CPT | Performed by: NURSE PRACTITIONER

## 2019-12-30 PROCEDURE — 6370000000 HC RX 637 (ALT 250 FOR IP): Performed by: PSYCHIATRY & NEUROLOGY

## 2019-12-30 RX ORDER — DIVALPROEX SODIUM 500 MG/1
1500 TABLET, EXTENDED RELEASE ORAL DAILY
Status: DISCONTINUED | OUTPATIENT
Start: 2019-12-30 | End: 2019-12-31 | Stop reason: HOSPADM

## 2019-12-30 RX ORDER — PALIPERIDONE 3 MG/1
3 TABLET, EXTENDED RELEASE ORAL ONCE
Status: COMPLETED | OUTPATIENT
Start: 2019-12-30 | End: 2019-12-30

## 2019-12-30 RX ORDER — PALIPERIDONE 6 MG/1
6 TABLET, EXTENDED RELEASE ORAL DAILY
Status: DISCONTINUED | OUTPATIENT
Start: 2019-12-31 | End: 2019-12-31 | Stop reason: HOSPADM

## 2019-12-30 RX ADMIN — PALIPERIDONE 3 MG: 3 TABLET, EXTENDED RELEASE ORAL at 09:09

## 2019-12-30 RX ADMIN — TRAZODONE HYDROCHLORIDE 50 MG: 50 TABLET ORAL at 20:48

## 2019-12-30 RX ADMIN — DIVALPROEX SODIUM 1500 MG: 500 TABLET, EXTENDED RELEASE ORAL at 11:12

## 2019-12-30 RX ADMIN — PALIPERIDONE 3 MG: 3 TABLET, EXTENDED RELEASE ORAL at 11:13

## 2019-12-30 ASSESSMENT — PAIN SCALES - GENERAL
PAINLEVEL_OUTOF10: 0

## 2019-12-30 NOTE — GROUP NOTE
Group Therapy Note    Date: 12/30/2019    Group Start Time: 1115  Group End Time: 1200  Group Topic: Psychotherapy    [facility]    ANALILIA Morin        Group Therapy Note    Attendees: 9         Patient's Goal:  To express feelings and engage in healthy interactions to manage mood and symptoms and develop insights into ways to improve interaction and communication with others. Notes:  Pt participated actively in group discussing personal issues. Pt made positive appropriate connections with others by providing positive feedback and support. Status After Intervention:  Improved    Participation Level:  Active Listener and Interactive    Participation Quality: Appropriate, Attentive, Sharing and Supportive      Speech:  normal      Thought Process/Content: Logical      Affective Functioning: Blunted      Mood: depressed      Level of consciousness:  Alert, Oriented x4 and Attentive      Response to Learning: Able to verbalize current knowledge/experience, Able to verbalize/acknowledge new learning and Progressing to goal      Endings: None Reported    Modes of Intervention: Support, Socialization, Exploration and Problem-solving      Discipline Responsible: /Counselor      Signature:  ANALILIA Ortiz

## 2019-12-30 NOTE — PLAN OF CARE
Problem: Altered Mood, Psychotic Behavior:  Goal: Able to verbalize decrease in frequency and intensity of hallucinations  Description  Able to verbalize decrease in frequency and intensity of hallucinations  Outcome: Ongoing  Goal: Able to verbalize reality based thinking  Description  Able to verbalize reality based thinking  12/30/2019 1059 by Victorino Lee RN  Outcome: Ongoing  12/30/2019 0543 by Von Aschoff, RN  Outcome: Ongoing   pt denies si/hi and hallucinations. Pt has rapid pressured speech with flight of ideas. Pt attends groups and takes meds.

## 2019-12-30 NOTE — CARE COORDINATION
NAZIA Note: d/c planning; NAZIA met with pt during treatment team. He would like referred to [facility] for housing. SW called and left message at [phone]. Pt plans to return first to the [place] [address] until he can get into housing program.  He also follows at [facility] and has appt on 1/06 at 10 am.    NAZIA called [facility] [phone] option 4: Omar Carlson.

## 2019-12-30 NOTE — PROGRESS NOTES
Grandiose    Perception: [x]  None  [] Auditory   [] Visual  [] tactile   [] olfactory  [] Illusions         Insight: [] Intact  [] Fair  [x] Limited    Judgement:  [] Intact  [] Fair  [x] Limited      Assessment/Plan:        Patient Active Problem List   Diagnosis Code    Depression, major, single episode F32.9    Severe episode of recurrent major depressive disorder, with psychotic features ([facility] [address] F33.3         Plan:    []  Patient is refusing medications  [x] Improving as expected   [] Not improving as expected   [] Worsening    []  At Baseline     Will increase Invega to 6 mg and depakote to 1500 mg    Reason for more than one antipsychotic:  [x] N/A  [] 3 failed monotherapy(drugs tried):  [] Cross over to a new antipsychotic  [] Taper to monotherapy from polypharmacy  [] Augmentation of Clozapine therapy due to treatment resistance to single therapy      Signed:  Cindy Felix  12/30/2019  10:07 AM

## 2019-12-31 VITALS
HEART RATE: 80 BPM | OXYGEN SATURATION: 98 % | TEMPERATURE: 97.9 F | SYSTOLIC BLOOD PRESSURE: 108 MMHG | HEIGHT: 70 IN | RESPIRATION RATE: 12 BRPM | DIASTOLIC BLOOD PRESSURE: 58 MMHG | WEIGHT: 175 LBS | BODY MASS INDEX: 25.05 KG/M2

## 2019-12-31 PROCEDURE — 6370000000 HC RX 637 (ALT 250 FOR IP): Performed by: PSYCHIATRY & NEUROLOGY

## 2019-12-31 PROCEDURE — 99238 HOSP IP/OBS DSCHRG MGMT 30/<: CPT | Performed by: NURSE PRACTITIONER

## 2019-12-31 RX ORDER — NICOTINE 21 MG/24HR
1 PATCH, TRANSDERMAL 24 HOURS TRANSDERMAL DAILY
Qty: 30 PATCH | Refills: 0 | Status: SHIPPED | OUTPATIENT
Start: 2019-12-31 | End: 2020-06-04

## 2019-12-31 RX ORDER — DIVALPROEX SODIUM 500 MG/1
1500 TABLET, EXTENDED RELEASE ORAL DAILY
Qty: 90 TABLET | Refills: 0 | Status: SHIPPED | OUTPATIENT
Start: 2020-01-01 | End: 2020-06-04

## 2019-12-31 RX ORDER — RISPERIDONE 2 MG/1
2 TABLET, FILM COATED ORAL 2 TIMES DAILY
Qty: 60 TABLET | Refills: 0 | Status: SHIPPED | OUTPATIENT
Start: 2019-12-31 | End: 2020-06-04

## 2019-12-31 RX ORDER — PALIPERIDONE 6 MG/1
6 TABLET, EXTENDED RELEASE ORAL DAILY
Qty: 30 TABLET | Refills: 0 | Status: SHIPPED | OUTPATIENT
Start: 2020-01-01 | End: 2019-12-31 | Stop reason: HOSPADM

## 2019-12-31 RX ORDER — RISPERIDONE 2 MG/1
2 TABLET, FILM COATED ORAL 2 TIMES DAILY
Qty: 60 TABLET | Refills: 3 | Status: SHIPPED | OUTPATIENT
Start: 2019-12-31 | End: 2019-12-31 | Stop reason: SDUPTHER

## 2019-12-31 RX ADMIN — DIVALPROEX SODIUM 1500 MG: 500 TABLET, EXTENDED RELEASE ORAL at 08:31

## 2019-12-31 RX ADMIN — HYDROXYZINE PAMOATE 50 MG: 50 CAPSULE ORAL at 01:27

## 2019-12-31 RX ADMIN — ALUMINUM HYDROXIDE, MAGNESIUM HYDROXIDE, AND SIMETHICONE 30 ML: 200; 200; 20 SUSPENSION ORAL at 01:29

## 2019-12-31 RX ADMIN — PALIPERIDONE 6 MG: 6 TABLET, EXTENDED RELEASE ORAL at 08:31

## 2019-12-31 ASSESSMENT — PAIN SCALES - GENERAL: PAINLEVEL_OUTOF10: 0

## 2019-12-31 NOTE — PROGRESS NOTES
[facility]  Discharge Note    Pt discharged with followings belongings:   Dentures: None  Vision - Corrective Lenses: None  Hearing Aid: None  Jewelry: None  Body Piercings Removed: N/A  Clothing: Footwear, Jacket / coat, Pants, Shirt, Sweater, Socks, Undergarments (Comment)  Were All Patient Medications Collected?: Not Applicable  Other Valuables: Cell phone, Wallet(bag, , headphones, special hyg products)   Valuables  returned to patient. Patient education on aftercare instructions:   Patient verbalize understanding of AVS:  .    Status EXAM upon discharge:  Status and Exam  Normal: Yes  Facial Expression: Brightened  Affect: Appropriate  Level of Consciousness: Alert  Mood:Normal: No  Mood: Anxious  Motor Activity:Normal: Yes  Motor Activity: Agitated  Interview Behavior: Cooperative  Preception: [place] to Person, Linda Maha to Time, [place] to Place, [place] to Situation  Attention:Normal: No  Attention: Distractible  Thought Processes: Circumstantial, Flt.of Ideas, Loose Assoc. Thought Content:Normal: No  Thought Content: Preoccupations  Hallucinations: None  Delusions: Yes  Delusions:  Other(See Comment)  Memory:Normal: Yes  Memory: Poor Recent  Insight and Judgment: Yes  Insight and Judgment: (improving)  Present Suicidal Ideation: No  Present Homicidal Ideation: No      Metabolic Screening:    Lab Results   Component Value Date    LABA1C 5.6 12/26/2019       Lab Results   Component Value Date    CHOL 208 (H) 12/26/2019     Lab Results   Component Value Date    TRIG 164 (H) 12/26/2019     Lab Results   Component Value Date    HDL 45 12/26/2019     No components found for: [facility] [place]  Lab Results   Component Value Date    LABVLDL 33 12/26/2019       Leticia Strong RN

## 2019-12-31 NOTE — DISCHARGE SUMMARY
Intermittent Eye Contact  [] Poor Eye Contact     Mood: [] Depressed  [] Anxious  [] Irritated  [x] Euthymic   [] Angry [] Restless    Affect:  [x] Congruent  [] Incongruent  [] Labile  [] Constricted  [] Flat  [] Bizarre     Thought Process and Association:  [] Logical [] Illogical       [x] Linear and Goal Directed  [] Tangential  [] Circumstantial     Thought Content:  [x] Denies [] Endorses [] Suicidal [] Homicidal  [] Delusional      [] Paranoid  [] Somatic  [] Grandiose    Perception: [x]  None  [] Auditory   [] Visual  [] tactile   [] olfactory  [] Illusions         Insight: [] Intact  [x] Fair  [] Limited    Judgement:  [] Intact  [x] Fair  [] Limited    Hospital Course:   Admit Date: 12/25/2019     Discharge Date: 12/31/2019  Admitted from:  [x]  Emergency Room  []  Home  []  Another facility   []  [facility]     Admitting diagnosis:   Patient Active Problem List   Diagnosis    Depression, major, single episode    Severe episode of recurrent major depressive disorder, with psychotic features ([facility] [address]      Length of stay:  6 days              Kaye Velez was admitted in Psychiatric unit  from ER with depression and paranoia. Patient was treated            With the above . Patient responded well to the treatment. Discharge Summary Plan:     Discharge Status:    [x] Improved [] Unchanged    [] Worse       Discharge instructions given:  [x] Patient    [] Family [] Other         Discharge disposition:  [x] Home [] Step Down unit  [] Group Home []  [facility]                                                    [] [facility]IDENT[facility] T[facility]AT[facility] FAC[facility]    [] AMA  [] Other           Prescriptions: Continue same medications, review with patient.        Reason for more than one antipsychotic:  [x] N/A  [] 3 failed monotherapy(drugs tried):  [] Cross over to a new antipsychotic  [] Taper to monotherapy from polypharmacy  [] Augmentation of Clozapine therapy due to treatment resistance to single therapy      Diagnosis:        Patient Active Problem List Diagnosis Code    Depression, major, single episode F32.9    [facility] F33.[facility]       Education and Follow-up:  Counseled:  [x] Patient     [] Family    [] Guardian      Signed:   Aura Miller   12/31/2019   11:11 AM

## 2019-12-31 NOTE — CARE COORDINATION
In order to ensure appropriate transition and discharge planning is in place, the following documents have been transmitted to [employer]  as the new outpatient provider:     The d/c diagnosis was transmitted to the next care provider   The reason for hospitalization was transmitted to the next care provider   The d/c medications (dosage and indication) were transmitted to the next care provider    The continuing care plan was transmitted to the next care provider  Electronically signed by Primo Madsen [facility] on 12/31/2019 at 1:03 PM

## 2019-12-31 NOTE — PROGRESS NOTES
CLINICAL PHARMACY NOTE: MEDS TO [address] Select Patient?: No  Total # of Prescriptions Filled: 2   The following medications were delivered to the patient:  · Divalproex sodium er 500mg tb24  · Risperidone 2mg tabs  Total # of Interventions Completed: 3  Time Spent (min): 30    Additional Documentation:

## 2020-03-03 ENCOUNTER — APPOINTMENT (OUTPATIENT)
Dept: GENERAL RADIOLOGY | Age: 30
End: 2020-03-03
Payer: COMMERCIAL

## 2020-03-03 ENCOUNTER — HOSPITAL ENCOUNTER (EMERGENCY)
Age: 30
Discharge: HOME | End: 2020-03-03
Payer: COMMERCIAL

## 2020-03-03 VITALS
TEMPERATURE: 97.5 F | RESPIRATION RATE: 15 BRPM | HEART RATE: 91 BPM | OXYGEN SATURATION: 96 % | HEIGHT: 71 IN | WEIGHT: 210 LBS | BODY MASS INDEX: 29.4 KG/M2

## 2020-03-03 LAB
INFLUENZA A BY PCR: NOT DETECTED
INFLUENZA B BY PCR: NOT DETECTED
STREP GRP A PCR: NEGATIVE

## 2020-03-03 PROCEDURE — 87502 INFLUENZA DNA AMP PROBE: CPT

## 2020-03-03 PROCEDURE — 87880 STREP A ASSAY W/OPTIC: CPT

## 2020-03-03 PROCEDURE — 71046 X-RAY EXAM CHEST 2 VIEWS: CPT

## 2020-03-03 PROCEDURE — 99283 EMERGENCY DEPT VISIT LOW MDM: CPT

## 2020-03-03 PROCEDURE — 6370000000 HC RX 637 (ALT 250 FOR IP): Performed by: PHYSICIAN ASSISTANT

## 2020-03-03 RX ORDER — IBUPROFEN 800 MG/1
800 TABLET ORAL EVERY 6 HOURS PRN
Qty: 21 TABLET | Refills: 0 | Status: SHIPPED | OUTPATIENT
Start: 2020-03-03 | End: 2020-08-14

## 2020-03-03 RX ORDER — ACETAMINOPHEN 500 MG
1000 TABLET ORAL ONCE
Status: COMPLETED | OUTPATIENT
Start: 2020-03-03 | End: 2020-03-03

## 2020-03-03 RX ORDER — BROMPHENIRAMINE MALEATE, PSEUDOEPHEDRINE HYDROCHLORIDE, AND DEXTROMETHORPHAN HYDROBROMIDE 2; 30; 10 MG/5ML; MG/5ML; MG/5ML
5 SYRUP ORAL 4 TIMES DAILY PRN
Qty: 120 ML | Refills: 0 | Status: SHIPPED | OUTPATIENT
Start: 2020-03-03 | End: 2020-03-08

## 2020-03-03 RX ORDER — AZITHROMYCIN 250 MG/1
TABLET, FILM COATED ORAL
Qty: 1 PACKET | Refills: 0 | Status: SHIPPED | OUTPATIENT
Start: 2020-03-03 | End: 2020-03-13

## 2020-03-03 RX ADMIN — ACETAMINOPHEN 1000 MG: 500 TABLET ORAL at 13:33

## 2020-03-03 ASSESSMENT — PAIN SCALES - GENERAL: PAINLEVEL_OUTOF10: 7

## 2020-03-03 ASSESSMENT — PAIN DESCRIPTION - PAIN TYPE: TYPE: ACUTE PAIN

## 2020-03-03 ASSESSMENT — PAIN DESCRIPTION - FREQUENCY: FREQUENCY: CONTINUOUS

## 2020-03-03 ASSESSMENT — PAIN DESCRIPTION - DESCRIPTORS: DESCRIPTORS: ACHING

## 2020-03-03 ASSESSMENT — PAIN DESCRIPTION - LOCATION: LOCATION: FACE;HEAD

## 2020-03-03 NOTE — ED PROVIDER NOTES
Independent P     HPI: Sandy Saeed is a 26 y.o. male with a past medical history of  has a past medical history of HIV (human immunodeficiency virus infection) ([facility] presenting with complaints of a cough with nasal congestion. The patient states that these symptoms began gradually. The history is obtained from the patient. The patient states that he has had some subjective chills at home. Patient does complain of a mild cough associated with it that is nonproductive. Patient denies excessive fatigue or sleeping greater than 18 hours a day. Patient denies exposure to mononucleosis. The patient denies any abdominal pain, left upper quadrant fullness, or early satiety. The patient also denies difficulty breathing, hemoptysis, neck pain/stiffness, or blurry vision. Sx have persisted and are mildly worse which is what prompted the visit today. unknown exposure to sick contacts, symptoms 3 days ago he is currently on antiviral medication for HIV. Is being followed by infectious disease. Uncertain of his current CD4 count. He states he has had nasal congestion, rhinorrhea, flu like symptoms with congestion, body aches, chills and fevers.        ROS:   Pertinent positives and negatives are stated within HPI, all other systems reviewed and are negative.      --------------------------------------------- PAST HISTORY ---------------------------------------------  Past Medical History:  has a past medical history of HIV (human immunodeficiency virus infection) ([facility] Past Surgical History:  has no past surgical history on file. Social History:  reports that he has never smoked. He has never used smokeless tobacco. He reports previous drug use. Drugs: Methamphetamines and Marijuana. He reports that he does not drink alcohol. Family History: family history is not on file. The patients home medications have been reviewed. Allergies: Patient has no known allergies.     ------------------------- NURSING NOTES AND VITALS REVIEWED ---------------------------   The nursing notes within the ED encounter and vital signs as below have been reviewed by myself. Pulse 91   Temp 97.5 °F (36.4 °C)   Resp 15   Ht 5' 11\" (1.803 m)   Wt 210 lb (95.3 kg)   SpO2 96%   BMI 29.29 kg/m²   Oxygen Saturation Interpretation: Normal    The patients available past medical records and past encounters were reviewed. Physical exam:  Constitutional: Vital signs are reviewed the patient is comfortable. The patient is alert and oriented and conversant. Head: The head is atraumatic and normocephalic. Eyes: No discharge is present from the eyes. The sclera are normal.  ENT: The oropharynx demonstrates a small amount of erythema bilaterally. There is no tonsillar enlargement nor is there any exudate present. No uvular deviation or edema. No tonsillary asymmetry.  Floor of the mouth soft, no trismus, handling secretions. TMs bilaterally demonstrate no evidence of infection. Neck: Normal range of motion is achieved in the neck. There is no JVD present. No meningeal signs are present   Anterior cervical adenopathy is nromal.  Respiratory/chest: The chest is nontender. Breath sounds are normal. There is no respiratory distress.   Cardiovascular: Heart shows a regular rate and rhythm without murmurs clicks or gallops. Abdominal exam: The abdomen is non tender without evidence of peritoneal signs.  Specific attention to the left upper quadrant with palpation of the spleen demonstrates no organomegaly or tenderness  Skin: warm and dry, without rash  Neurologic: GCS 15   Psych: Normal Affect  -------------------------------------------------- RESULTS -------------------------------------------------    LABS:  Results for orders placed or performed during the hospital encounter of 03/03/20   Rapid influenza A/B antigens   Result Value Ref Range    Influenza A by PCR Not Detected Not Detected    Influenza B by PCR Not Detected Not Detected   Strep Screen Group A Throat   Result Value Ref Range    Strep Grp A PCR Negative Negative       RADIOLOGY:  Interpreted by Radiologist.  XR CHEST STANDARD (2 VW)   Final Result   NO ACUTE CARDIOPULMONARY PROCESS                  ------------------------------ ED COURSE/MEDICAL DECISION MAKING----------------------  Medications   acetaminophen (TYLENOL) tablet 1,000 mg (1,000 mg Oral Given 3/3/20 7453)             Medical Decision Making:         Upper respiratory infection likely viral in etiology. Not hypoxic, nothing to suggests pneumonia. Well appearing, non toxic, appropriate for outpatient management. Plan is for symptom management and PCP follow up.         This patient's ED course included: a personal history and physicial eaxmination and re-evaluation prior to disposition    This patient has remained hemodynamically stable during their ED course. Counseling: The emergency provider has spoken with the patient and discussed todays results, in addition to providing specific details for the plan of care and counseling regarding the diagnosis and prognosis. Questions are answered at this time and they are agreeable with the plan.       --------------------------------- IMPRESSION AND DISPOSITION ---------------------------------    IMPRESSION  1.  Acute upper respiratory infection        DISPOSITION  Disposition: Discharge to home  Patient condition is good             FRAN Gonzalez - CHUY  03/03/20 4394

## 2020-03-03 NOTE — ED NOTES
FIRST PROVIDER CONTACT ASSESSMENT NOTE      Department of Emergency Medicine   ED  First Provider Note   3/3/20  12:41 PM    Chief Complaint: Facial Pain (facial pain and congestion with sore throat and inability to sleep. )      History of Present Illness:    Astrid Oconnor is a 26 y.o. male who presents to the ED by private car for congestion, sore throat and inability to sleep with fevers and chills for the last 4 days  Focused Screening Exam:  Constitutional:  Alert, appears stated age and is in no distress. *ALLERGIES*     Patient has no known allergies.      ED Triage Vitals [03/03/20 1222]   BP Temp Temp src Pulse Resp SpO2 Height Weight   -- 97.5 °F (36.4 °C) -- 91 -- 96 % -- --        Initial Plan of Care:  Initiate Treatment-Testing, Proceed toTreatment Area When Bed Available for ED Attending/MLP to Continue Care    -----------------END OF FIRST PROVIDER CONTACT ASSESSMENT NOTE--------------  Electronically signed by Laureen Pulido PA-C   DD: 3/3/20       Laureen Pulido PA-C  03/03/20 7288

## 2020-06-04 ENCOUNTER — OFFICE VISIT (OUTPATIENT)
Dept: PRIMARY CARE CLINIC | Age: 30
End: 2020-06-04
Payer: COMMERCIAL

## 2020-06-04 VITALS
DIASTOLIC BLOOD PRESSURE: 68 MMHG | HEIGHT: 71 IN | SYSTOLIC BLOOD PRESSURE: 112 MMHG | HEART RATE: 86 BPM | BODY MASS INDEX: 28.84 KG/M2 | OXYGEN SATURATION: 97 % | WEIGHT: 206 LBS | TEMPERATURE: 97.8 F | RESPIRATION RATE: 16 BRPM

## 2020-06-04 PROCEDURE — G8419 CALC BMI OUT NRM PARAM NOF/U: HCPCS | Performed by: FAMILY MEDICINE

## 2020-06-04 PROCEDURE — 1036F TOBACCO NON-USER: CPT | Performed by: FAMILY MEDICINE

## 2020-06-04 PROCEDURE — 99204 OFFICE O/P NEW MOD 45 MIN: CPT | Performed by: FAMILY MEDICINE

## 2020-06-04 PROCEDURE — G8427 DOCREV CUR MEDS BY ELIG CLIN: HCPCS | Performed by: FAMILY MEDICINE

## 2020-06-04 RX ORDER — BICTEGRAVIR SODIUM, EMTRICITABINE, AND TENOFOVIR ALAFENAMIDE FUMARATE 50; 200; 25 MG/1; MG/1; MG/1
1 TABLET ORAL DAILY
COMMUNITY
End: 2020-08-14

## 2020-06-04 RX ORDER — SULFAMETHOXAZOLE AND TRIMETHOPRIM 800; 160 MG/1; MG/1
1 TABLET ORAL 2 TIMES DAILY
Qty: 14 TABLET | Refills: 0 | Status: SHIPPED | OUTPATIENT
Start: 2020-06-04 | End: 2020-06-11

## 2020-06-04 RX ORDER — TRAMADOL HYDROCHLORIDE 50 MG/1
50 TABLET ORAL EVERY 8 HOURS PRN
Qty: 75 TABLET | Refills: 0 | Status: SHIPPED | OUTPATIENT
Start: 2020-06-04 | End: 2020-07-04

## 2020-06-04 NOTE — PROGRESS NOTES
swelling and myalgias. Skin: Positive for rash. Neurological: Negative for dizziness, tremors, weakness and headaches. Hematological: Does not bruise/bleed easily. Psychiatric/Behavioral: Positive for decreased concentration and sleep disturbance. Negative for hallucinations and suicidal ideas. The patient is nervous/anxious. All other systems reviewed and are negative. Prior to Visit Medications    Medication Sig Taking? Authorizing Provider   bictegravir-emtricitab-tenofovir alafenamide (BIKTARVY) -25 MG TABS per tablet Take 1 tablet by mouth daily Yes Historical Provider, MD   traMADol (ULTRAM) 50 MG tablet Take 1 tablet by mouth every 8 hours as needed for Pain for up to 30 days. Intended supply: 7 days. Take lowest dose possible to manage pain Yes Caleb Ko,    sulfamethoxazole-trimethoprim (BACTRIM DS;SEPTRA DS) 800-160 MG per tablet Take 1 tablet by mouth 2 times daily for 7 days Yes Caleb Ko,    ARIPiprazole (ABILIFY) 10 MG tablet take 1 tablet by mouth once daily Yes Historical Provider, MD   guanFACINE (INTUNIV) 1 MG TB24 extended release tablet take 1 tablet by mouth once daily Yes Historical Provider, MD   ibuprofen (ADVIL;MOTRIN) 800 MG tablet Take 1 tablet by mouth every 6 hours as needed for Pain Yes FRAN Del Toro - CNP        No Known Allergies    Past Medical History:   Diagnosis Date    HIV (human immunodeficiency virus infection) ([facility]     [facility] ([facility]     previous       History reviewed. No pertinent surgical history.     Social History     Socioeconomic History    Marital status: Single     Spouse name: Not on file    Number of children: Not on file    Years of education: Not on file    Highest education level: Not on file   Occupational History    Not on file   Social Needs    Financial resource strain: Not on file    Food insecurity     Worry: Not on file     Inability: Not on file    Transportation needs     Medical: Not on file Microalbumin / Creatinine Urine Ratio    Lipid Panel    Basic Metabolic Panel    Hepatic Function Panel    CBC    TSH without Reflex    LIPASE    C-REACTIVE PROTEIN   6. HIV positive (HCC)  Hemoglobin A1C    Microalbumin / Creatinine Urine Ratio    Lipid Panel    Basic Metabolic Panel    Hepatic Function Panel    CBC    TSH without Reflex    LIPASE    C-REACTIVE PROTEIN     X-ray of the lumbar spine was read as normal.  X-ray of the left hip shows changes consistent with sequela of prior trauma. We will refer to physical therapy for symptomatic relief. Baseline labs ordered today as well. Will treat empirically for the folliculitis and refer to dermatology for further evaluation and treatment. See him back in 3 months or sooner based on results. Johana Weems D.O.   9:15 AM  6/5/2020       This document may have been prepared at least partially through the use of voice recognition software. Although effort is taken to assure the accuracy of this document, it is possible that grammatical, syntax,  or spelling errors may occur.

## 2020-06-05 ASSESSMENT — ENCOUNTER SYMPTOMS
COUGH: 0
ABDOMINAL PAIN: 0
CONSTIPATION: 0
SHORTNESS OF BREATH: 0
SORE THROAT: 0
PHOTOPHOBIA: 0
NAUSEA: 0
BACK PAIN: 1
VOMITING: 0
BLOOD IN STOOL: 0
DIARRHEA: 0

## 2020-07-01 ENCOUNTER — HOSPITAL ENCOUNTER (OUTPATIENT)
Dept: PHYSICAL THERAPY | Age: 30
Setting detail: THERAPIES SERIES
Discharge: HOME | End: 2020-07-01
Payer: COMMERCIAL

## 2020-07-01 PROCEDURE — 97161 PT EVAL LOW COMPLEX 20 MIN: CPT

## 2020-08-14 ENCOUNTER — OFFICE VISIT (OUTPATIENT)
Dept: PRIMARY CARE CLINIC | Age: 30
End: 2020-08-14
Payer: COMMERCIAL

## 2020-08-14 VITALS
RESPIRATION RATE: 16 BRPM | BODY MASS INDEX: 27.3 KG/M2 | OXYGEN SATURATION: 97 % | TEMPERATURE: 97.7 F | WEIGHT: 195 LBS | HEART RATE: 103 BPM | HEIGHT: 71 IN | DIASTOLIC BLOOD PRESSURE: 70 MMHG | SYSTOLIC BLOOD PRESSURE: 112 MMHG

## 2020-08-14 PROBLEM — F90.0 ATTENTION DEFICIT HYPERACTIVITY DISORDER (ADHD), PREDOMINANTLY INATTENTIVE TYPE: Status: ACTIVE | Noted: 2020-08-14

## 2020-08-14 PROBLEM — F33.3 SEVERE EPISODE OF RECURRENT MAJOR DEPRESSIVE DISORDER, WITH PSYCHOTIC FEATURES (HCC): Status: RESOLVED | Noted: 2019-12-25 | Resolved: 2020-08-14

## 2020-08-14 PROBLEM — B35.3 TINEA PEDIS OF BOTH FEET: Status: ACTIVE | Noted: 2020-08-14

## 2020-08-14 PROBLEM — K21.9 GASTROESOPHAGEAL REFLUX DISEASE: Status: ACTIVE | Noted: 2020-08-14

## 2020-08-14 PROBLEM — N52.9 ERECTILE DYSFUNCTION: Status: ACTIVE | Noted: 2019-11-01

## 2020-08-14 PROCEDURE — G8419 CALC BMI OUT NRM PARAM NOF/U: HCPCS | Performed by: FAMILY MEDICINE

## 2020-08-14 PROCEDURE — 1036F TOBACCO NON-USER: CPT | Performed by: FAMILY MEDICINE

## 2020-08-14 PROCEDURE — 99213 OFFICE O/P EST LOW 20 MIN: CPT | Performed by: FAMILY MEDICINE

## 2020-08-14 PROCEDURE — G8427 DOCREV CUR MEDS BY ELIG CLIN: HCPCS | Performed by: FAMILY MEDICINE

## 2020-08-14 RX ORDER — PANTOPRAZOLE SODIUM 40 MG/1
40 TABLET, DELAYED RELEASE ORAL
Qty: 30 TABLET | Refills: 5 | Status: SHIPPED
Start: 2020-08-14 | End: 2021-02-25

## 2020-08-14 RX ORDER — SILDENAFIL CITRATE 20 MG/1
60 TABLET ORAL DAILY PRN
Qty: 60 TABLET | Refills: 5 | Status: SHIPPED
Start: 2020-08-14 | End: 2020-12-04

## 2020-08-14 RX ORDER — DEXTROAMPHETAMINE SACCHARATE, AMPHETAMINE ASPARTATE, DEXTROAMPHETAMINE SULFATE AND AMPHETAMINE SULFATE 2.5; 2.5; 2.5; 2.5 MG/1; MG/1; MG/1; MG/1
10 TABLET ORAL 2 TIMES DAILY
Qty: 60 TABLET | Refills: 0 | Status: SHIPPED
Start: 2020-08-14 | End: 2020-09-28 | Stop reason: SINTOL

## 2020-08-14 RX ORDER — TERBINAFINE HYDROCHLORIDE 250 MG/1
250 TABLET ORAL DAILY
Qty: 42 TABLET | Refills: 0 | Status: SHIPPED | OUTPATIENT
Start: 2020-08-14 | End: 2020-09-25

## 2020-08-14 ASSESSMENT — ENCOUNTER SYMPTOMS
BLOOD IN STOOL: 0
PHOTOPHOBIA: 0
HEARTBURN: 1
COUGH: 0
SHORTNESS OF BREATH: 0
DIARRHEA: 0
SORE THROAT: 0
ABDOMINAL PAIN: 0
NAUSEA: 0
VOMITING: 0
BACK PAIN: 1
CONSTIPATION: 0
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
## 2020-09-03 ENCOUNTER — TELEPHONE (OUTPATIENT)
Dept: PRIMARY CARE CLINIC | Age: 30
End: 2020-09-03

## 2020-09-03 NOTE — TELEPHONE ENCOUNTER
The pt is calling because his sildenafil 20 mg needs prior authorized, he has already spoken to his insurance and they told him it would be covered if it is prior authorized

## 2020-09-18 ENCOUNTER — OFFICE VISIT (OUTPATIENT)
Dept: PODIATRY | Age: 30
End: 2020-09-18
Payer: COMMERCIAL

## 2020-09-18 VITALS — WEIGHT: 195 LBS | HEIGHT: 71 IN | RESPIRATION RATE: 14 BRPM | BODY MASS INDEX: 27.3 KG/M2

## 2020-09-18 PROCEDURE — 99203 OFFICE O/P NEW LOW 30 MIN: CPT | Performed by: PODIATRIST

## 2020-09-18 PROCEDURE — G8419 CALC BMI OUT NRM PARAM NOF/U: HCPCS | Performed by: PODIATRIST

## 2020-09-18 PROCEDURE — 1036F TOBACCO NON-USER: CPT | Performed by: PODIATRIST

## 2020-09-18 PROCEDURE — G8427 DOCREV CUR MEDS BY ELIG CLIN: HCPCS | Performed by: PODIATRIST

## 2020-09-18 RX ORDER — PRENATAL VIT 91/IRON/FOLIC/DHA 28-975-200
COMBINATION PACKAGE (EA) ORAL
Qty: 1 TUBE | Refills: 2 | Status: SHIPPED
Start: 2020-09-18 | End: 2020-09-23 | Stop reason: SDUPTHER

## 2020-09-18 RX ORDER — AMMONIUM LACTATE 12 G/100G
LOTION TOPICAL
Qty: 400 G | Refills: 2 | Status: SHIPPED
Start: 2020-09-18 | End: 2020-09-23 | Stop reason: SDUPTHER

## 2020-09-18 NOTE — PROGRESS NOTES
Garland Baer is a new patient here today referred by his PCP Dr. Jolie Zabala. He is here today for b/l foot pain/rash. He states it has been going on for about year now. No known injury. 20  Robi Mckeon : 1990 Sex: male  Age: 31 y.o. Patient was referred by Stacie Barnett DO    CC:    Dry skin on the bottom both feet with itching and redness    HPI:   This pleasant 31year-old this pleasant 27-year-old male patient referred to me today dry skin on bottom both feet with itching and redness. Does have history of HIV. Denies recent follow-up with infectious disease team.  Does state he has had itching and some dried skin on the bottom both feet for several years. No open skin lesions or abrasions. Denies any foot or ankle pain today. Denies any current lotion or any cream he has been applying to either foot. Was seen by his new primary care physician Dr. Cecelia Lehman and was started on oral Lamisil. States he does not take it all the time. Denies recent follow-up with infectious disease. No additional pedal complaints at this time. ROS:  Const: Denies constitutional symptoms  Musculo: Denies symptoms other than stated above  Skin: Denies symptoms other than stated above       Current Outpatient Medications:     ammonium lactate (LAC-HYDRIN) 12 % lotion, Apply topically twice daily, Disp: 400 g, Rfl: 2    terbinafine (LAMISIL) 1 % cream, Apply affected area topically 2 times daily. , Disp: 1 Tube, Rfl: 2    pantoprazole (PROTONIX) 40 MG tablet, Take 1 tablet by mouth every morning (before breakfast), Disp: 30 tablet, Rfl: 5    amphetamine-dextroamphetamine (ADDERALL, 10MG,) 10 MG tablet, Take 1 tablet by mouth 2 times daily for 30 days. , Disp: 60 tablet, Rfl: 0    sildenafil (REVATIO) 20 MG tablet, Take 3 tablets by mouth daily as needed (ED), Disp: 60 tablet, Rfl: 5    terbinafine (LAMISIL) 250 MG tablet, Take 1 tablet by mouth daily, Disp: 42 tablet, Rfl: 0  No Known Allergies    Past bottom of both feet. Was seen by Dr. Dilcia Batres and was started on oral Lamisil. States he has not been taking it every day. Importance of monitoring with Dr. Dilcia Batres discussed in detail. I did send formal referral to infectious disease for plantar skin lesions. Patient to continue lotion twice daily. Follow-up as needed. Return if symptoms worsen or fail to improve. Seen By:  Venita Sharma DPM      Document was created using voice recognition software. Note was reviewed, however may contain grammatical errors.

## 2020-09-23 RX ORDER — PRENATAL VIT 91/IRON/FOLIC/DHA 28-975-200
COMBINATION PACKAGE (EA) ORAL
Qty: 1 TUBE | Refills: 2 | Status: SHIPPED
Start: 2020-09-23 | End: 2021-04-01

## 2020-09-23 RX ORDER — AMMONIUM LACTATE 12 G/100G
LOTION TOPICAL
Qty: 400 G | Refills: 2 | Status: SHIPPED
Start: 2020-09-23 | End: 2021-09-28

## 2020-09-23 NOTE — TELEPHONE ENCOUNTER
Spoke with pt. Let him know scripts have been sent to preferred pharmacy. He verbalized understanding.

## 2020-09-23 NOTE — TELEPHONE ENCOUNTER
Pt called in stating he would like his medication to go to a different pharmacy. Would you be able to resend script? Rx pended with [facility]; [facility] in [place].

## 2020-09-28 ENCOUNTER — OFFICE VISIT (OUTPATIENT)
Dept: PRIMARY CARE CLINIC | Age: 30
End: 2020-09-28
Payer: COMMERCIAL

## 2020-09-28 VITALS
SYSTOLIC BLOOD PRESSURE: 136 MMHG | DIASTOLIC BLOOD PRESSURE: 82 MMHG | TEMPERATURE: 97.3 F | WEIGHT: 198 LBS | RESPIRATION RATE: 16 BRPM | HEIGHT: 71 IN | OXYGEN SATURATION: 98 % | BODY MASS INDEX: 27.72 KG/M2 | HEART RATE: 83 BPM

## 2020-09-28 PROCEDURE — G8419 CALC BMI OUT NRM PARAM NOF/U: HCPCS | Performed by: FAMILY MEDICINE

## 2020-09-28 PROCEDURE — 1036F TOBACCO NON-USER: CPT | Performed by: FAMILY MEDICINE

## 2020-09-28 PROCEDURE — 99214 OFFICE O/P EST MOD 30 MIN: CPT | Performed by: FAMILY MEDICINE

## 2020-09-28 PROCEDURE — G8427 DOCREV CUR MEDS BY ELIG CLIN: HCPCS | Performed by: FAMILY MEDICINE

## 2020-09-28 RX ORDER — TRAZODONE HYDROCHLORIDE 50 MG/1
TABLET ORAL
COMMUNITY
Start: 2020-09-17 | End: 2020-12-09 | Stop reason: ALTCHOICE

## 2020-09-28 RX ORDER — ARIPIPRAZOLE 10 MG/1
TABLET ORAL
COMMUNITY
Start: 2020-09-17 | End: 2020-12-04

## 2020-09-28 RX ORDER — SILDENAFIL 100 MG/1
100 TABLET, FILM COATED ORAL DAILY PRN
Qty: 20 TABLET | Refills: 5 | Status: SHIPPED
Start: 2020-09-28 | End: 2020-12-29 | Stop reason: SDUPTHER

## 2020-09-28 RX ORDER — DEXTROAMPHETAMINE SACCHARATE, AMPHETAMINE ASPARTATE MONOHYDRATE, DEXTROAMPHETAMINE SULFATE AND AMPHETAMINE SULFATE 5; 5; 5; 5 MG/1; MG/1; MG/1; MG/1
20 CAPSULE, EXTENDED RELEASE ORAL EVERY MORNING
Qty: 30 CAPSULE | Refills: 0 | Status: SHIPPED
Start: 2020-09-28 | End: 2020-10-28 | Stop reason: SDUPTHER

## 2020-09-28 ASSESSMENT — ENCOUNTER SYMPTOMS
BACK PAIN: 1
VOMITING: 0
SORE THROAT: 0
SHORTNESS OF BREATH: 0
DIARRHEA: 0
PHOTOPHOBIA: 0
ABDOMINAL PAIN: 0
CONSTIPATION: 0
COUGH: 0
BLOOD IN STOOL: 0
NAUSEA: 0
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
## 2020-10-07 ENCOUNTER — TELEPHONE (OUTPATIENT)
Dept: PRIMARY CARE CLINIC | Age: 30
End: 2020-10-07

## 2020-10-07 NOTE — TELEPHONE ENCOUNTER
The pt is calling because at his appointment 9/28 you guys talked about his adderall being increased but when he went to get it from the pharmacy it was still the 20 mg

## 2020-10-14 ENCOUNTER — OFFICE VISIT (OUTPATIENT)
Dept: ORTHOPEDIC SURGERY | Age: 30
End: 2020-10-14
Payer: COMMERCIAL

## 2020-10-14 VITALS — BODY MASS INDEX: 28 KG/M2 | WEIGHT: 200 LBS | TEMPERATURE: 97.3 F | HEIGHT: 71 IN

## 2020-10-14 PROCEDURE — G8484 FLU IMMUNIZE NO ADMIN: HCPCS | Performed by: STUDENT IN AN ORGANIZED HEALTH CARE EDUCATION/TRAINING PROGRAM

## 2020-10-14 PROCEDURE — 1036F TOBACCO NON-USER: CPT | Performed by: STUDENT IN AN ORGANIZED HEALTH CARE EDUCATION/TRAINING PROGRAM

## 2020-10-14 PROCEDURE — G8427 DOCREV CUR MEDS BY ELIG CLIN: HCPCS | Performed by: STUDENT IN AN ORGANIZED HEALTH CARE EDUCATION/TRAINING PROGRAM

## 2020-10-14 PROCEDURE — 99203 OFFICE O/P NEW LOW 30 MIN: CPT | Performed by: STUDENT IN AN ORGANIZED HEALTH CARE EDUCATION/TRAINING PROGRAM

## 2020-10-14 PROCEDURE — G8419 CALC BMI OUT NRM PARAM NOF/U: HCPCS | Performed by: STUDENT IN AN ORGANIZED HEALTH CARE EDUCATION/TRAINING PROGRAM
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
## 2020-10-22 ENCOUNTER — EVALUATION (OUTPATIENT)
Dept: PHYSICAL THERAPY | Age: 30
End: 2020-10-22
Payer: COMMERCIAL

## 2020-10-22 PROCEDURE — 97161 PT EVAL LOW COMPLEX 20 MIN: CPT | Performed by: PHYSICAL THERAPIST

## 2020-10-22 NOTE — PROGRESS NOTES
[facility]                Phone: [phone]   Fax: [phone]    Physical Therapy Initial Evaluation  Date:  10/22/2020    Patient Name:  Toño Mcconnell    :  1990  MRN: [id number]  Referring Physician:  Kyrstal Araujo.      Evaluation date:  10/22/20  Diagnosis:  B knee pain    Precautions:  none     Evaluating Physical Therapist:  Javy Snider, [facility]       Subjective:  History/Onset of Symptoms:  pt presents to therapy with c/o B knee pain for ~ 20 yrs off/on of insidious onset; no PMH for knee injury/sx per pt; tells PT that he has \"popped\" his knee out of  place but has not had to have any intervention to \"put it back\";  B x-rays states that L is worse than R in all aspects; no c/o N/T into either leg but has issue  with occassional buckling; c/o popping and snapping as well; sleep is fine; no MEDS per pt; no follow-up at this time     Palpation:  discomfort noted across B jt lines into B femoral condyles and medial patellar margins    Pain:  8/10       Sensation:  intact B LE's       Objective:    ROM:  WNL for all ranges B LE's     Strength:  grossly 4+, 5/5 for all ranges      Gait:  normal/independent gait mechanics noted B YANNA's     Additional Comments:  Lachmans/Apley's/varus-valgus   -/+/-; patellar tracking is slightly lateral with L being worse than R       Plan:     Below checked are areas for improvement during physical therapy POC:        [x]  Pain reduction  [x]  Balance Improvement       [x]  Strengthening  []  Postural Improvement   []  Range of Motion  []  Soft Tissue Improvement    []  Gait Training   []  Other:      [x]  Home Exercise Program      Pt will be see for 2 visits per week for 4 weeks for a total of 8 visits to accomplish goals set below:          Goals: (4 weeks)      1. Pain:  decrease pain across B knees with all prolonged activities, 0-4/1  2.   Strength:  increase strength across B knees to grossly 5/5 for all ranges improving static/dynamic balance to GOOD+/NORMAL    3. Improve endurance for all prolonged activities to GOOD+/NORMAL    4. Assure I with HEP for home management of condition        Pt's potential for reaching Physical Therapy goals: FAIR to GOOD. [facility] [place]     [facility]:  Phone: [phone]   Fax: [phone]     If you have any questions or concerns, please don't hesitate to call. Thank you for your referral.    Physician Signature:________________________________Date:__________________  By signing above, therapists plan is approved by physician. All patients under [employer]   must be signed by physician.

## 2020-10-22 NOTE — PROGRESS NOTES
 Severe episode of recurrent major depressive disorder, with psychotic features ([facility] 12/25/2019    Substance abuse ([facility]     previous     No past surgical history on file. Medications:   Current Outpatient Medications   Medication Sig Dispense Refill    ARIPiprazole (ABILIFY) 10 MG tablet TK 1 T PO QD      traZODone (DESYREL) 50 MG tablet TK 1 TO 2 TS PO HS PRN      amphetamine-dextroamphetamine (ADDERALL XR) 20 MG extended release capsule Take 1 capsule by mouth every morning for 30 days. 30 capsule 0    sildenafil (VIAGRA) 100 MG tablet Take 1 tablet by mouth daily as needed for Erectile Dysfunction 20 tablet 5    ammonium lactate (LAC-HYDRIN) 12 % lotion Apply topically twice daily 400 g 2    terbinafine (LAMISIL) 1 % cream Apply affected area topically 2 times daily. 1 Tube 2    pantoprazole (PROTONIX) 40 MG tablet Take 1 tablet by mouth every morning (before breakfast) 30 tablet 5    sildenafil (REVATIO) 20 MG tablet Take 3 tablets by mouth daily as needed (ED) 60 tablet 5     No current facility-administered medications for this visit. Occupation: {occupation:74929}. Physical demands include: {physicaldemands:69918}. Status: {workstatus:34088}.     Exercise regimen: {exerciseregimen:52415}    Hobbies: {hobbies:26631}    Patient Goals: {patientgoals:57060}    Precautions/Contraindications: {precautions:95785}    OBJECTIVE:     Observations: {observations:67751}    Inspection: {leinspection:24643}    Edema: {edema:90784} {kneelocation:27795}    Gait: {gait:36687}    Joint/Motion:    Knee:  Right:   AROM: {knee rom:832028282}***° Flexion,  ***° Extension    Left:   AROM: {knee rom:570486087}***° Flexion,  ***° Extension      Muscle Length Testing:   Quad: R: *** L: ***   HS 90/90: R: *** L: ***   IT Band: R: *** L: ***    Strength:    Knee:   Right: Hip: ***, Knee: Flexion {NUMBERS; EXAM STRENGTH:92554},  Extension {NUMBERS; EXAM STRENGTH:06585}  Left: Hip: ***, Knee: Flexion {NUMBERS; EXAM Vasocompression  [] Electrical Stimulation  [] Lumbar/Cervical Traction  [] Ultrasound   [] Iontophoresis: 4 mg/mL Dexamethasone Sodium Phosphate 40-80 mAmin  [] Dry Needling      [x] Instruction in HEP      []  Medication allergies reviewed for use of Dexamethasone Sodium Phosphate 4mg/ml  with iontophoresis treatments. Patient is not allergic. The following CPT codes are likely to be used in the care of this patient: {codes:59766}      Suggested Professional Referral: [x] No  [] Yes:     Patient Education:  [x] Plans/Goals, Risks/Benefits discussed  [x] Home exercise program  Method of Education: [x] Verbal  [x] Demo  [x] Written  Comprehension of Education:  [x] Verbalizes understanding. [x] Demonstrates understanding. [] Needs Review. [] Demonstrates/verbalizes understanding of HEP/Ed previously given. Frequency: {daysperweek:95760} for {numberofweeks:92052}    Patient understands diagnosis/prognosis and consents to treatment, plan and goals: [x] Yes    [] No     Thank you for the opportunity to work with your patient. If you have questions or comments, please contact me at numbers listed above. Electronically signed by: Lisbet Dahl, PT PT DPT [id number]         Please sign Physician's Certification and return to: [address] [place] [address] E  [address] [place] [address]  Dept: [phone]  Dept Fax: [phone] PT , DPT PT [id number]    Physician's Certification / Comments     Frequency/Duration {daysperweek:99306} for {numberofweeks:42884}. Certification period from 10/22/2020  to ***. I have reviewed the Plan of Care established for skilled therapy services and certify that the services are required and that they will be provided while the patient is under my care.     Physician's Comments/Revisions:               Physician's Printed Name:                                           [de-identified] Signature: Date:

## 2020-10-26 ENCOUNTER — TELEPHONE (OUTPATIENT)
Dept: PRIMARY CARE CLINIC | Age: 30
End: 2020-10-26

## 2020-10-27 NOTE — TELEPHONE ENCOUNTER
Will you add a referral for a psych doctor that will manage meds? Pt states he is currently seeing someone at [facility] and they do not manage Adderall. Patient asking if you will refill the current dosage until he can see a psych doctor.

## 2020-10-28 RX ORDER — DEXTROAMPHETAMINE SACCHARATE, AMPHETAMINE ASPARTATE MONOHYDRATE, DEXTROAMPHETAMINE SULFATE AND AMPHETAMINE SULFATE 5; 5; 5; 5 MG/1; MG/1; MG/1; MG/1
20 CAPSULE, EXTENDED RELEASE ORAL EVERY MORNING
Qty: 30 CAPSULE | Refills: 0 | Status: SHIPPED
Start: 2020-10-28 | End: 2020-12-04 | Stop reason: SDUPTHER

## 2020-11-05 ENCOUNTER — TREATMENT (OUTPATIENT)
Dept: PHYSICAL THERAPY | Age: 30
End: 2020-11-05
Payer: COMMERCIAL

## 2020-11-05 PROCEDURE — 97110 THERAPEUTIC EXERCISES: CPT | Performed by: PHYSICAL THERAPIST

## 2020-11-05 PROCEDURE — 97530 THERAPEUTIC ACTIVITIES: CPT | Performed by: PHYSICAL THERAPIST

## 2020-11-05 NOTE — PROGRESS NOTES
[facility]                Phone: [phone]   Fax: [phone]    Physical Therapy Daily Treatment Note  Date:  2020    Patient Name:  John Campos    :  1990  MRN: [id number]    Evaluating therapist:  FAZAL Caraballo                  Restrictions/Precautions:    Diagnosis:  B knee pain  Treatment Diagnosis:    Insurance/Certification information:    Referring Physician:  Zenaida Spore of care signed (Y/N):    Visit# / total visits:  -  Pain level: 8/10   Time In:  2658  Time Out:  1549    Subjective:      Exercises:  Exercise/Equipment Resistance/Repetitions Other comments   bike   10 min            SAQ 2x15    SLR 2x10    quad sets  20x3s           Total Gym squats with ball 3 x10      L8           toe raises     step ups 2x10x6\"            side 2x10x6\"    standing TKE with tband [id number]                                                       Other Therapeutic Activities:      Home Exercise Program:  provided 20    Manual Treatments:      Modalities:      Timed Code Treatment Minutes: Total Treatment Minutes:      Treatment/Activity Tolerance:  [] Patient tolerated treatment well [] Patient limited by fatique  [] Patient limited by pain  [] Patient limited by other medical complications  [] Other:     Prognosis: [] Good [] Fair  [] Poor    Patient Requires Follow-up: [] Yes  [] No    Plan:   [] Continue per plan of care [] Alter current plan (see comments)  [] Plan of care initiated [] Hold pending MD visit [] Discharge  Plan for Next Session:      See Weekly Progress Note: []  Yes  []  No  Next due:        Electronically signed by:   Jensen Oro

## 2020-11-09 ENCOUNTER — TELEPHONE (OUTPATIENT)
Dept: PRIMARY CARE CLINIC | Age: 30
End: 2020-11-09

## 2020-11-09 NOTE — TELEPHONE ENCOUNTER
Pt asking if his sildenafil can be switched to Cialis. He said that the 100 mg is too expensive and he feels that the Cialis may be a better medication for him.

## 2020-11-09 NOTE — TELEPHONE ENCOUNTER
Advised patient to use Good[facility].  Patient states he will call [facility] and get prices on the 2 medications and see which is cheaper with GoodRx

## 2020-11-09 NOTE — TELEPHONE ENCOUNTER
The pt is calling to see what the mg on Cialis you would prescribe him so he can look on Good Rx because the pharmacy says that they can;t give prices for Good Rx until the script is put through

## 2020-11-10 RX ORDER — TADALAFIL 5 MG/1
5 TABLET ORAL PRN
Qty: 30 TABLET | Refills: 0 | Status: SHIPPED
Start: 2020-11-10 | End: 2021-01-11

## 2020-11-13 ENCOUNTER — TELEPHONE (OUTPATIENT)
Dept: PRIMARY CARE CLINIC | Age: 30
End: 2020-11-13

## 2020-11-13 NOTE — TELEPHONE ENCOUNTER
----- Message from Kaitlin Villanueva DO sent at 11/12/2020  3:32 PM EST -----  Until visit. Patient has not had any of his labs done. If he he has issues with his current problem we can refer him to general surgery.

## 2020-11-13 NOTE — TELEPHONE ENCOUNTER
Per Dr. Bobby Muñiz, patient needs to report to the ED and cancel appointment for today. Per Dr. Bobby Muñiz \"Patient has not done anything I've asked him to do since he established. \"

## 2020-11-20 RX ORDER — DEXTROAMPHETAMINE SACCHARATE, AMPHETAMINE ASPARTATE MONOHYDRATE, DEXTROAMPHETAMINE SULFATE AND AMPHETAMINE SULFATE 5; 5; 5; 5 MG/1; MG/1; MG/1; MG/1
20 CAPSULE, EXTENDED RELEASE ORAL EVERY MORNING
Qty: 30 CAPSULE | Refills: 0 | OUTPATIENT
Start: 2020-11-20 | End: 2020-12-20

## 2020-11-23 ENCOUNTER — OFFICE VISIT (OUTPATIENT)
Dept: PODIATRY | Age: 30
End: 2020-11-23

## 2020-11-23 PROCEDURE — 99024 POSTOP FOLLOW-UP VISIT: CPT | Performed by: PODIATRIST

## 2020-11-30 ENCOUNTER — TELEPHONE (OUTPATIENT)
Dept: PRIMARY CARE CLINIC | Age: 30
End: 2020-11-30

## 2020-11-30 RX ORDER — SILDENAFIL 100 MG/1
100 TABLET, FILM COATED ORAL DAILY PRN
Qty: 20 TABLET | Refills: 5 | OUTPATIENT
Start: 2020-11-30

## 2020-11-30 RX ORDER — DEXTROAMPHETAMINE SACCHARATE, AMPHETAMINE ASPARTATE MONOHYDRATE, DEXTROAMPHETAMINE SULFATE AND AMPHETAMINE SULFATE 5; 5; 5; 5 MG/1; MG/1; MG/1; MG/1
20 CAPSULE, EXTENDED RELEASE ORAL EVERY MORNING
Qty: 30 CAPSULE | Refills: 0 | OUTPATIENT
Start: 2020-11-30 | End: 2020-12-30

## 2020-11-30 NOTE — TELEPHONE ENCOUNTER
You and the pt discussed upping the dosage on his cialis 5 mg but didn't because you thought it would be to expensive, he is calling to see if it can be upped to the 10 mg because he can afford that. Also he was originally on adderall 10 mg immediate release tablet but that didn't seem to be helping him so you changed it to adderall 20 mg ER capsule, he wants to know if he can be put on adderall 20 mg immediate release tablet bid instead of the ER capsule qd.  The pt is requesting that these be sent to 2 different pharmacy's so if you let me know that you are okay with the changes I can put that info in and send it back to you

## 2020-11-30 NOTE — TELEPHONE ENCOUNTER
Patient notified, verbalized understanding.   He's to have labs done and to go to psych for adderall

## 2020-11-30 NOTE — TELEPHONE ENCOUNTER
As noted in previous notes refills will not be given as he has never had any of the labs that were requested to keep him on the current dosing. He was referred to psychiatrist for further evaluation and treatment with the Adderall.

## 2020-12-02 ENCOUNTER — OFFICE VISIT (OUTPATIENT)
Dept: PODIATRY | Age: 30
End: 2020-12-02
Payer: COMMERCIAL

## 2020-12-02 DIAGNOSIS — Z00.00 ADULT GENERAL MEDICAL EXAM: ICD-10-CM

## 2020-12-02 DIAGNOSIS — F32.9 MAJOR DEPRESSIVE DISORDER WITH SINGLE EPISODE, REMISSION STATUS UNSPECIFIED: ICD-10-CM

## 2020-12-02 DIAGNOSIS — M25.552 LEFT HIP PAIN: ICD-10-CM

## 2020-12-02 DIAGNOSIS — F33.3 SEVERE EPISODE OF RECURRENT MAJOR DEPRESSIVE DISORDER, WITH PSYCHOTIC FEATURES (HCC): ICD-10-CM

## 2020-12-02 DIAGNOSIS — L73.8 FOLLICULITIS BARBAE: ICD-10-CM

## 2020-12-02 DIAGNOSIS — Z21 ASYMPTOMATIC HIV INFECTION (HCC): ICD-10-CM

## 2020-12-02 DIAGNOSIS — Z21 HIV POSITIVE (HCC): ICD-10-CM

## 2020-12-02 LAB
ALBUMIN SERPL-MCNC: 4.1 G/DL (ref 3.5–5.2)
ALP BLD-CCNC: 70 U/L (ref 40–129)
ALT SERPL-CCNC: 17 U/L (ref 0–40)
ANION GAP SERPL CALCULATED.3IONS-SCNC: 11 MMOL/L (ref 7–16)
AST SERPL-CCNC: 21 U/L (ref 0–39)
BILIRUB SERPL-MCNC: 0.5 MG/DL (ref 0–1.2)
BILIRUBIN DIRECT: <0.2 MG/DL (ref 0–0.3)
BILIRUBIN, INDIRECT: NORMAL MG/DL (ref 0–1)
BUN BLDV-MCNC: 10 MG/DL (ref 6–20)
C-REACTIVE PROTEIN: 0.3 MG/DL (ref 0–0.4)
CALCIUM SERPL-MCNC: 9.7 MG/DL (ref 8.6–10.2)
CHLORIDE BLD-SCNC: 101 MMOL/L (ref 98–107)
CHOLESTEROL, TOTAL: 244 MG/DL (ref 0–199)
CO2: 26 MMOL/L (ref 22–29)
CREAT SERPL-MCNC: 1.4 MG/DL (ref 0.7–1.2)
GFR AFRICAN AMERICAN: >60
GFR NON-AFRICAN AMERICAN: >60 ML/MIN/1.73
GLUCOSE BLD-MCNC: 73 MG/DL (ref 74–99)
HBA1C MFR BLD: 5.6 % (ref 4–5.6)
HCT VFR BLD CALC: 48 % (ref 37–54)
HDLC SERPL-MCNC: 44 MG/DL
HEMOGLOBIN: 15.6 G/DL (ref 12.5–16.5)
LDL CHOLESTEROL CALCULATED: 173 MG/DL (ref 0–99)
LIPASE: 18 U/L (ref 13–60)
MCH RBC QN AUTO: 30.4 PG (ref 26–35)
MCHC RBC AUTO-ENTMCNC: 32.5 % (ref 32–34.5)
MCV RBC AUTO: 93.4 FL (ref 80–99.9)
PDW BLD-RTO: 13.7 FL (ref 11.5–15)
PLATELET # BLD: 287 E9/L (ref 130–450)
PMV BLD AUTO: 9.8 FL (ref 7–12)
POTASSIUM SERPL-SCNC: 3.7 MMOL/L (ref 3.5–5)
RBC # BLD: 5.14 E12/L (ref 3.8–5.8)
SODIUM BLD-SCNC: 138 MMOL/L (ref 132–146)
TOTAL PROTEIN: 7.9 G/DL (ref 6.4–8.3)
TRIGL SERPL-MCNC: 133 MG/DL (ref 0–149)
TSH SERPL DL<=0.05 MIU/L-ACNC: 0.55 UIU/ML (ref 0.27–4.2)
VLDLC SERPL CALC-MCNC: 27 MG/DL
WBC # BLD: 6.4 E9/L (ref 4.5–11.5)

## 2020-12-02 PROCEDURE — G8484 FLU IMMUNIZE NO ADMIN: HCPCS | Performed by: PODIATRIST

## 2020-12-02 PROCEDURE — G8427 DOCREV CUR MEDS BY ELIG CLIN: HCPCS | Performed by: PODIATRIST

## 2020-12-02 PROCEDURE — G8419 CALC BMI OUT NRM PARAM NOF/U: HCPCS | Performed by: PODIATRIST

## 2020-12-02 PROCEDURE — 99213 OFFICE O/P EST LOW 20 MIN: CPT | Performed by: PODIATRIST

## 2020-12-02 PROCEDURE — 1036F TOBACCO NON-USER: CPT | Performed by: PODIATRIST

## 2020-12-02 NOTE — PROGRESS NOTES
Patient in office with c/o left great toe pain. Jordy Shaikh : 1990 Sex: adult  Age: 31 y.o. Patient was referred by Denis Isabel DO    CC:    Pain left great toe    HPI:   Patient presents today pain left great toe. Does have previous history of athlete's foot on bottom both feet which has improved since last visit. Not currently using lotion on the bottom of either foot. States he patient might have an injury when he was younger at his left great toe but no recent injury. Did have radiographs taken today. Denies recent formal treatment or therapy. Was currently participating in formal physical therapy for his knee but he had to stop due to the tenderness left great toe. ROS:  Const: Denies constitutional symptoms  Musculo: Denies symptoms other than stated above  Skin: Denies symptoms other than stated above       Current Outpatient Medications:     doxycycline hyclate (VIBRA-TABS) 100 MG tablet, , Disp: , Rfl:     clindamycin (CLINDAGEL) 1 % gel, , Disp: , Rfl:     BIKTARVY -25 MG TABS per tablet, , Disp: , Rfl:     amphetamine-dextroamphetamine (ADDERALL XR) 20 MG extended release capsule, Take 1 capsule by mouth every morning for 30 days. , Disp: 30 capsule, Rfl: 0    tadalafil (CIALIS) 5 MG tablet, Take 1 tablet by mouth as needed for Erectile Dysfunction, Disp: 30 tablet, Rfl: 0    sildenafil (VIAGRA) 100 MG tablet, Take 1 tablet by mouth daily as needed for Erectile Dysfunction, Disp: 20 tablet, Rfl: 5    ammonium lactate (LAC-HYDRIN) 12 % lotion, Apply topically twice daily, Disp: 400 g, Rfl: 2    terbinafine (LAMISIL) 1 % cream, Apply affected area topically 2 times daily. , Disp: 1 Tube, Rfl: 2    pantoprazole (PROTONIX) 40 MG tablet, Take 1 tablet by mouth every morning (before breakfast), Disp: 30 tablet, Rfl: 5  No Known Allergies    Past Medical History:   Diagnosis Date    Depression     HIV (human immunodeficiency virus infection) (HCC)     Severe episode of recurrent major depressive disorder, with psychotic features ([facility] 12/25/2019    Substance abuse ([facility]     previous           There were no vitals filed for this visit. Work History/Social History:     Focused Lower Extremity Physical Exam:    Neurovascular examination:    Dorsalis Pedis palpable bilateral.  Posterior tibialis palpable bilateral.    Capillary Refill Time:  Immediate return  Hair growth:  Symmetrical and bilateral   Skin:  Not atrophic  Edema: No edema bilateral feet or ankles. Neurologic:  Light touch intact bilateral.      Musculoskeletal/ Orthopedic examination:    Equinis: Absent bilateral  Dorsiflexion, plantarflexion, inversion, eversion bilateral 5 out of 5 muscle strength  Wiggling toes  Negative Homans  Tenderness to palpation with dorsiflexion plantarflexion left first metatarsophalangeal joint. No crepitus noted. No pain plantar first metatarsal head. Dermatology examination:    No open skin lesions or abrasions bilateral lower extremity. Dry flaky skin plantar feet. Erythematous moccasin distribution plantar heels-improving            Assessment and Plan:  Frederick Almaguer was seen today for toe pain. Diagnoses and all orders for this visit:    Hallux limitus, left  -     DME Order for (Specify) as OP  -     XR FOOT LEFT (MIN 3 VIEWS); Future    Pain of left great toe  -     DME Order for (Specify) as OP  -     XR FOOT LEFT (MIN 3 VIEWS); Future    HIV infection, unspecified symptom status ([facility]        Patient seen left great toe pain. Previously was treated for tinea pedis. Not currently using lotion on the bottom of either foot. Has progressed well no open wounds plantar feet. I did order three-view weightbearing left foot radiographs. Radiographs reviewed. No acute fracture dislocation. I did write a DME for custom orthotics. I did recommend full-length orthotics with Livingston's extension. Gradual break-in.   We did write a DME order once again for patient. I will follow-up as needed. Return if symptoms worsen or fail to improve. Seen By:  Wanda Lou DPM      Document was created using voice recognition software. Note was reviewed, however may contain grammatical errors.

## 2020-12-03 ENCOUNTER — TELEPHONE (OUTPATIENT)
Dept: FAMILY MEDICINE CLINIC | Age: 30
End: 2020-12-03

## 2020-12-03 LAB
HAV IGM SER IA-ACNC: NORMAL
HEPATITIS B CORE IGM ANTIBODY: NORMAL
HEPATITIS B SURFACE ANTIGEN INTERPRETATION: NORMAL
HEPATITIS C ANTIBODY INTERPRETATION: NORMAL
RPR TITER: NORMAL
RPR: REACTIVE

## 2020-12-03 NOTE — TELEPHONE ENCOUNTER
Patient calling in form lab results at this time, in computer will need reviewed. He is also asking if you would reconsider filling his adderall since he has gotten his labs drawn. The psyhiatry office that he spoke with said that they do not order that for their patients. Please advise.

## 2020-12-04 RX ORDER — DEXTROAMPHETAMINE SACCHARATE, AMPHETAMINE ASPARTATE MONOHYDRATE, DEXTROAMPHETAMINE SULFATE AND AMPHETAMINE SULFATE 5; 5; 5; 5 MG/1; MG/1; MG/1; MG/1
20 CAPSULE, EXTENDED RELEASE ORAL EVERY MORNING
Qty: 30 CAPSULE | Refills: 0 | Status: SHIPPED
Start: 2020-12-04 | End: 2021-01-21 | Stop reason: SDUPTHER

## 2020-12-04 NOTE — TELEPHONE ENCOUNTER
When I first saw the patient roughly 6 months ago he stated that he was following with an infectious disease doctor in [facility]. Because of his HIV status I assumed that this was still an active relationship per patient. Because it is not please send the previous referral so that we can follow-up on this. The concern about the reactive syphilis would be whether it is due to a current infection or merely reactive because of his previous infection. I am currently waiting for the confirmation testing however if he is not seeing an infectious disease doctor I believe it is imperative because he was also having symptoms of possible secondary syphilis when I first saw him. There is a delay in diagnosis and treatment because he did not get his labs done for least 6 months.

## 2020-12-04 NOTE — TELEPHONE ENCOUNTER
Notified patient. Patient verbalized understanding. Pt would like to know what the reactive syphilis means? He states he is having testicle pain. He has not seen an infectious disease doctor in 6 mths. I do see where a referral was placed by Dr. Melita Barthel. I called their office since it was sent internally. I was notified that they do not work their [facility] [facility] and it needs faxed. Please advise if you want to place a new referral or have this one sent?   I will address with management about the internal referral.

## 2020-12-06 LAB — TREPONEMA PALLIDUM ANTIBODIES: REACTIVE

## 2020-12-09 RX ORDER — DOXYCYCLINE HYCLATE 100 MG
TABLET ORAL
COMMUNITY
Start: 2020-09-21 | End: 2021-06-10 | Stop reason: ALTCHOICE

## 2020-12-09 RX ORDER — CLINDAMYCIN PHOSPHATE 10 MG/G
GEL TOPICAL
COMMUNITY
Start: 2020-09-21 | End: 2021-08-30 | Stop reason: ALTCHOICE

## 2020-12-09 RX ORDER — BICTEGRAVIR SODIUM, EMTRICITABINE, AND TENOFOVIR ALAFENAMIDE FUMARATE 50; 200; 25 MG/1; MG/1; MG/1
1 TABLET ORAL NIGHTLY
COMMUNITY
Start: 2020-11-12

## 2020-12-09 NOTE — TELEPHONE ENCOUNTER
Needs to see infectious disease. My concern is repeat syphilis infection versus positive testing given his previous treatment. Also needs to follow with infectious disease for his HIV as well. Please see if we can get him an appointment as soon as possible with somebody in their office.

## 2020-12-09 NOTE — TELEPHONE ENCOUNTER
Spoke with patient and advised. Can you please put in a new referral to Dr Denise Camarillo?    The one in the chart currently if made by Dr Urban Goodpasture and didn't have all the DX on it that is needed    Thank you

## 2020-12-09 NOTE — TELEPHONE ENCOUNTER
Dr. Geovanna Gipson, Do you have any insight into care/treatment for this issue? Or would that be something infectious disease would guide him on?    Please advise

## 2020-12-22 ENCOUNTER — TELEPHONE (OUTPATIENT)
Dept: ORTHOPEDIC SURGERY | Age: 30
End: 2020-12-22

## 2020-12-22 ENCOUNTER — TELEPHONE (OUTPATIENT)
Dept: ADMINISTRATIVE | Age: 30
End: 2020-12-22

## 2020-12-22 NOTE — TELEPHONE ENCOUNTER
Patient calling inquiring about Physical done in September. Insurance is saying he he hasn't completed one for the year. Patient inquiring if visit was coded correctly. Patient also inquiring about getting into another dermatologist as his current one is not working for him.

## 2020-12-22 NOTE — TELEPHONE ENCOUNTER
It was not billed as a the call because acute issues were discussed. For it to be billed as a physical only preventative measures can be discussed during the visit.

## 2020-12-22 NOTE — TELEPHONE ENCOUNTER
The pt is calling back and was advised to call insurance about who excepts insurance for dermatology.  He is also asking again about the physical he had 9/28 and asking why it wasn't billed as one

## 2020-12-22 NOTE — TELEPHONE ENCOUNTER
Patient states the physical therapy he did do, did not help. He is dealing with left great toe pain / injury . He states the left knee is worse than right knee. Discussed wanting MRI. Since Left knee is worse, left knee MRI WO contrast placed. Patient was instructed to call office once MRI is scheduled to set up follow up appointment // MRI review.     TRINO

## 2020-12-22 NOTE — TELEPHONE ENCOUNTER
For dermatology referral, patient will need to call insurance to see who else is covered. Lm for patient to return call.

## 2020-12-29 RX ORDER — SILDENAFIL 100 MG/1
100 TABLET, FILM COATED ORAL DAILY PRN
Qty: 20 TABLET | Refills: 5 | Status: SHIPPED
Start: 2020-12-29 | End: 2021-04-01 | Stop reason: SDUPTHER

## 2021-01-02 ENCOUNTER — HOSPITAL ENCOUNTER (OUTPATIENT)
Dept: MRI IMAGING | Age: 31
Discharge: HOME | End: 2021-01-04
Payer: COMMERCIAL

## 2021-01-02 DIAGNOSIS — M25.362 KNEE INSTABILITY, LEFT: ICD-10-CM

## 2021-01-02 DIAGNOSIS — M22.2X2 PATELLOFEMORAL PAIN SYNDROME OF LEFT KNEE: ICD-10-CM

## 2021-01-02 PROCEDURE — 73721 MRI JNT OF LWR EXTRE W/O DYE: CPT

## 2021-01-11 ENCOUNTER — TELEPHONE (OUTPATIENT)
Dept: PRIMARY CARE CLINIC | Age: 31
End: 2021-01-11

## 2021-01-11 ENCOUNTER — OFFICE VISIT (OUTPATIENT)
Dept: PRIMARY CARE CLINIC | Age: 31
End: 2021-01-11
Payer: COMMERCIAL

## 2021-01-11 VITALS
TEMPERATURE: 97.5 F | BODY MASS INDEX: 29.68 KG/M2 | OXYGEN SATURATION: 97 % | RESPIRATION RATE: 16 BRPM | SYSTOLIC BLOOD PRESSURE: 138 MMHG | HEART RATE: 89 BPM | DIASTOLIC BLOOD PRESSURE: 80 MMHG | HEIGHT: 71 IN | WEIGHT: 212 LBS

## 2021-01-11 DIAGNOSIS — L70.0 ACNE VULGARIS: ICD-10-CM

## 2021-01-11 DIAGNOSIS — M54.6 ACUTE LEFT-SIDED THORACIC BACK PAIN: Primary | ICD-10-CM

## 2021-01-11 DIAGNOSIS — F33.3 SEVERE EPISODE OF RECURRENT MAJOR DEPRESSIVE DISORDER, WITH PSYCHOTIC FEATURES (HCC): ICD-10-CM

## 2021-01-11 DIAGNOSIS — Z21 ASYMPTOMATIC HIV INFECTION (HCC): Primary | ICD-10-CM

## 2021-01-11 PROCEDURE — G8484 FLU IMMUNIZE NO ADMIN: HCPCS | Performed by: FAMILY MEDICINE

## 2021-01-11 PROCEDURE — 1036F TOBACCO NON-USER: CPT | Performed by: FAMILY MEDICINE

## 2021-01-11 PROCEDURE — 99213 OFFICE O/P EST LOW 20 MIN: CPT | Performed by: FAMILY MEDICINE

## 2021-01-11 PROCEDURE — G8427 DOCREV CUR MEDS BY ELIG CLIN: HCPCS | Performed by: FAMILY MEDICINE

## 2021-01-11 PROCEDURE — G8419 CALC BMI OUT NRM PARAM NOF/U: HCPCS | Performed by: FAMILY MEDICINE

## 2021-01-11 RX ORDER — MINOCYCLINE HYDROCHLORIDE 100 MG/1
100 CAPSULE ORAL 2 TIMES DAILY
Qty: 60 CAPSULE | Refills: 5 | Status: SHIPPED
Start: 2021-01-11 | End: 2021-07-30 | Stop reason: ALTCHOICE

## 2021-01-11 RX ORDER — TIZANIDINE 4 MG/1
4 TABLET ORAL 3 TIMES DAILY
Qty: 90 TABLET | Refills: 5 | Status: SHIPPED
Start: 2021-01-11 | End: 2021-09-04

## 2021-01-11 RX ORDER — HYDROCODONE BITARTRATE AND ACETAMINOPHEN 5; 325 MG/1; MG/1
1 TABLET ORAL EVERY 4 HOURS PRN
Qty: 30 TABLET | Refills: 0 | Status: SHIPPED | OUTPATIENT
Start: 2021-01-11 | End: 2021-01-16

## 2021-01-11 RX ORDER — KETOROLAC TROMETHAMINE 10 MG/1
10 TABLET, FILM COATED ORAL EVERY 6 HOURS PRN
Qty: 20 TABLET | Refills: 0 | Status: SHIPPED
Start: 2021-01-11 | End: 2021-04-01

## 2021-01-11 RX ORDER — SILDENAFIL CITRATE 20 MG/1
TABLET ORAL
COMMUNITY
Start: 2020-12-28 | End: 2021-08-06

## 2021-01-11 ASSESSMENT — ENCOUNTER SYMPTOMS
BLOOD IN STOOL: 0
SORE THROAT: 0
COUGH: 0
BACK PAIN: 1
DIARRHEA: 0
ABDOMINAL PAIN: 0
PHOTOPHOBIA: 0
SHORTNESS OF BREATH: 0
CONSTIPATION: 0
VOMITING: 0
NAUSEA: 0

## 2021-01-11 NOTE — PROGRESS NOTES
2021    Maximiliano Madrid (:  1990) is a 31 y.o. adult, here for evaluation of the following medical concerns:    HPI  Update 2020  Patient here for follow-up and for chronic issues. Patient states that his hip pain is much better than previous. His current issues include worsening erectile dysfunction for the last several months. Patient concerned about possible drug side effects. Also having issues with pharmacy anxiety issues and dominoes issues during sexual positioning. Patient relates longstanding history of GERD that has been worsening over the last several months as well. States there has been occasional gagging and vomiting of food. Of note patient has had oral gonorrhea in the past.  Patient also relates worsening ADHD since times including inattention and loss of focus. Patient had been on Adderall in the past but has not been on anything in recent past.  Currently follows with [facility] for mental health issues however they do not wish to treat him for the ADHD at this time. Patient also has concerns over bilateral foot lesions. Patient states it is been present for the last several years in addition to questionable tinea pedis. He is concerned because he does have a history of mild gonorrhea, syphilis, and chlamydia in the past.  Patient states that he had been treated successfully. Patient states that he has been tested in the past and was found to be negative. Unsure if this is untreated syphilis, incompletely/latent syphilis, or new exposure that may be causing some of his issue. Patient denies any pain or discharge. States however upon initial diagnosis he did not have any symptoms either. At that time he was co-infected with both HIV and syphilis.     Update 2020 Patient here to follow-up on chronic issues for medication adjustment. Patient states that he did take all medications as prescribed but states that the Adderall was ineffective and actually caused him side effects. Relates some insomnia, palpitation, and appetite loss. He was on extended release in the past without issue. He continues to follow with [facility] nurse practitioner for all of his psych issues. The have phone visits. Phone number is [phone]. Patient states that the low-dose Viagra also did not work for this appointment. Relates worsening issues with left knee pain after incident with the police. Has been having issues with locking and giving out. Pain mostly over the patellar region. No recent injury or overuse. Update 1/11/2021  Patient is here to follow-up on chronic issues and for new onset of low back and shoulder pain. Patient states symptoms began after moving a heavy mattress. Patient denies any trauma or injury to the affected region. Patient also continues to have right knee pain. Also was concerned about worsening acne despite treatment. We do not have any notes from infectious disease regarding patient's previous syphilis testing. No other current issues. Review of Systems   Constitutional: Negative for chills and fever. HENT: Negative for congestion, hearing loss, nosebleeds and sore throat. Eyes: Negative for photophobia. Respiratory: Negative for cough and shortness of breath. Cardiovascular: Negative for chest pain, palpitations and leg swelling. Gastrointestinal: Negative for abdominal pain, blood in stool, constipation, diarrhea, nausea and vomiting. Endocrine: Negative for polydipsia. Genitourinary: Negative for dysuria, frequency, hematuria and urgency. ED   Musculoskeletal: Positive for arthralgias, back pain, gait problem, joint swelling and myalgias. Skin: Positive for rash. Neurological: Negative for dizziness, tremors, weakness and headaches. Hematological: Does not bruise/bleed easily. Psychiatric/Behavioral: Positive for decreased concentration and sleep disturbance. Negative for hallucinations and suicidal ideas. The patient is nervous/anxious. All other systems reviewed and are negative. Prior to Visit Medications    Medication Sig Taking? Authorizing Provider   sildenafil (REVATIO) 20 MG tablet TAKE 3 TABLETS BY MOUTH DAILY AS NEEDED Yes Historical Provider, MD   ketorolac (TORADOL) 10 MG tablet Take 1 tablet by mouth every 6 hours as needed for Pain Yes Caleb Ko DO   tiZANidine (ZANAFLEX) 4 MG tablet Take 1 tablet by mouth 3 times daily Yes Caleb Ko DO   HYDROcodone-acetaminophen (NORCO) 5-325 MG per tablet Take 1 tablet by mouth every 4 hours as needed for Pain for up to 5 days. Intended supply: 5 days. Take lowest dose possible to manage pain Yes Caleb Ko DO   minocycline (MINOCIN;DYNACIN) 100 MG capsule Take 1 capsule by mouth 2 times daily Yes Caleb Ko DO   sildenafil (VIAGRA) 100 MG tablet Take 1 tablet by mouth daily as needed for Erectile Dysfunction Yes Caleb Ko DO   doxycycline hyclate (VIBRA-TABS) 100 MG tablet  Yes Historical Provider, MD   clindamycin (CLINDAGEL) 1 % gel  Yes Historical Provider, MD   BIKTARVY -25 MG TABS per tablet  Yes Historical Provider, MD   amphetamine-dextroamphetamine (ADDERALL XR) 20 MG extended release capsule Take 1 capsule by mouth every morning for 30 days. Yes Caleb Ko DO   ammonium lactate (LAC-HYDRIN) 12 % lotion Apply topically twice daily Yes Lucian Sanon DPM   terbinafine (LAMISIL) 1 % cream Apply affected area topically 2 times daily.  Yes Paolo Michaels DPM   pantoprazole (PROTONIX) 40 MG tablet Take 1 tablet by mouth every morning (before breakfast)  Patient not taking: Reported on 1/11/2021  Caleb Ko DO        No Known Allergies    Past Medical History: Diagnosis Date    Depression     HIV ([facility] virus infection) ([facility]     Severe episode of recurrent major depressive disorder, with psychotic features ([facility] 12/25/2019    Substance abuse ([facility]     previous       No past surgical history on file. Social History     Socioeconomic History    Marital status: Single     Spouse name: Not on file    Number of children: Not on file    Years of education: Not on file    Highest education level: Not on file   Occupational History    Not on file   Social Needs    Financial resource strain: Not on file    Food insecurity     Worry: Not on file     Inability: Not on file    Transportation needs     Medical: Not on file     Non-medical: Not on file   Tobacco Use    Smoking status: Never Smoker    Smokeless tobacco: Never Used   Substance and Sexual Activity    Alcohol use: Never     Frequency: Never    Drug use: Not Currently     Types: Methamphetamines, Marijuana    Sexual activity: Yes     Partners: Male     Comment: Uses condoms   Lifestyle    Physical activity     Days per week: Not on file     Minutes per session: Not on file    Stress: Not on file   Relationships    Social connections     Talks on phone: Not on file     Gets together: Not on file     Attends Confucianism service: Not on file     Active member of club or organization: Not on file     Attends meetings of clubs or organizations: Not on file     Relationship status: Not on file    Intimate partner violence     Fear of current or ex partner: Not on file     Emotionally abused: Not on file     Physically abused: Not on file     Forced sexual activity: Not on file   Other Topics Concern    Not on file   Social History Narrative    Not on file        No family history on file.     Vitals:    01/11/21 1419   BP: 138/80   Pulse: 89   Resp: 16   Temp: 97.5 °F (36.4 °C)   SpO2: 97%   Weight: 212 lb (96.2 kg)   Height: 5' 11\" (1.803 m) Estimated body mass index is 29.57 kg/m² as calculated from the following:    Height as of this encounter: 5' 11\" (1.803 m). Weight as of this encounter: 212 lb (96.2 kg). Physical Exam  HENT:      Head: Normocephalic and atraumatic. Eyes:      General: No scleral icterus. Conjunctiva/sclera: Conjunctivae normal.      Pupils: Pupils are equal, round, and reactive to light. Neck:      Musculoskeletal: Neck supple. Thyroid: No thyromegaly. Cardiovascular:      Rate and Rhythm: Normal rate and regular rhythm. Heart sounds: Normal heart sounds. No murmur. Pulmonary:      Effort: Pulmonary effort is normal.      Breath sounds: Normal breath sounds. No rales. Abdominal:      General: Bowel sounds are normal. There is no distension. Palpations: Abdomen is soft. Tenderness: There is no abdominal tenderness. Musculoskeletal:         General: Swelling and tenderness present. Left hip: Katya Fair exhibits decreased range of motion, decreased strength, tenderness and swelling. Thoracic back: Katya Fair exhibits decreased range of motion, tenderness, pain and spasm. Legs:    Lymphadenopathy:      Cervical: No cervical adenopathy. Skin:     General: Skin is warm and dry. Findings: Lesion present. No erythema or rash. Comments: Patient has maceration noted to the interdigital areas of the feet bilaterally. Also has hyperpigmented circular lesions to the soles of feet bilaterally. Neurological:      Mental Status: Katya Fair is alert and oriented to person, place, and time. Cranial Nerves: No cranial nerve deficit. Psychiatric:         Judgment: Judgment normal.           Assessment/Plan:   Diagnosis Orders   1.  Acute left-sided thoracic back pain  XR RIBS LEFT INCLUDE CHEST (MIN 3 VIEWS)    ketorolac (TORADOL) 10 MG tablet    tiZANidine (ZANAFLEX) 4 MG tablet    HYDROcodone-acetaminophen (NORCO) 5-325 MG per tablet 2. Acne vulgaris  minocycline (MINOCIN;DYNACIN) 100 MG capsule     Is advised to continue following with psychiatry. Initial review of x-ray in office reveals no acute fracture or dislocation. State monitoring program reviewed without any signs of abuse or diversion. We will send for infectious disease note for patient previous syphilis testing. See him back as needed. Horacio Jack D.O.   2:59 PM  1/11/2021       This document may have been prepared at least partially through the use of voice recognition software. Although effort is taken to assure the accuracy of this document, it is possible that grammatical, syntax,  or spelling errors may occur.

## 2021-01-11 NOTE — TELEPHONE ENCOUNTER
Patient is asking if pcp would prescribe nutritional shakes. He stated that he thinks he needs to improve his nutrition. Please advise.

## 2021-01-13 ENCOUNTER — OFFICE VISIT (OUTPATIENT)
Dept: ORTHOPEDIC SURGERY | Age: 31
End: 2021-01-13
Payer: COMMERCIAL

## 2021-01-13 ENCOUNTER — TELEPHONE (OUTPATIENT)
Dept: FAMILY MEDICINE CLINIC | Age: 31
End: 2021-01-13

## 2021-01-13 VITALS — WEIGHT: 212 LBS | HEIGHT: 71 IN | BODY MASS INDEX: 29.68 KG/M2 | TEMPERATURE: 96.3 F

## 2021-01-13 DIAGNOSIS — M25.362 KNEE INSTABILITY, LEFT: ICD-10-CM

## 2021-01-13 DIAGNOSIS — M22.2X2 PATELLOFEMORAL PAIN SYNDROME OF LEFT KNEE: Primary | ICD-10-CM

## 2021-01-13 PROCEDURE — G8417 CALC BMI ABV UP PARAM F/U: HCPCS | Performed by: ORTHOPAEDIC SURGERY

## 2021-01-13 PROCEDURE — G8484 FLU IMMUNIZE NO ADMIN: HCPCS | Performed by: ORTHOPAEDIC SURGERY

## 2021-01-13 PROCEDURE — G8427 DOCREV CUR MEDS BY ELIG CLIN: HCPCS | Performed by: ORTHOPAEDIC SURGERY

## 2021-01-13 PROCEDURE — 1036F TOBACCO NON-USER: CPT | Performed by: ORTHOPAEDIC SURGERY

## 2021-01-13 PROCEDURE — 99213 OFFICE O/P EST LOW 20 MIN: CPT | Performed by: ORTHOPAEDIC SURGERY

## 2021-01-13 NOTE — PROGRESS NOTES
Follow Up Visit     Meredith Goddard returns today for follow up visit regarding Bilateral knee pain, patello femoral instability. Treatment thus far has included Therapy; he states that it didn't help, MRI was ordered. He is here today to review MRI results. He reports symptoms are unchanged. Physical Exam:     Height: 5' 11\" (1.803 m), Weight: 212 lb (96.2 kg)    General: Alert and oriented x3, no acute distress  Cardiovascular/pulmonary: No labored breathing, peripheral perfusion intact  Musculoskeletal:    Exam of the knee shows positive Lockman. There is medial joint line tenderness. Range of motion is full. There is no swelling        Controlled Substances Monitoring:      Imaging:  MRI of the left knee was reviewed. This shows absence of ACL. There is also medial meniscus tear. There may be articular cartilage defect medial tibial plateau      Assessment: Left knee ACL tear, medial meniscus tear      Plan:   We discussed his knee today. He has failed conservative treatment. MRI shows above findings. He is interested in surgical management. We discussed this would involve left knee arthroscopy, ACL reconstruction with autograft, partial medial meniscectomy versus meniscus repair. We will look at scheduling this in the near future. He will require clearance from his medical doctor as well as infectious disease given his HIV status. He understands and will call us to schedule once cleared.     Lori Kocher, MD  Orthopaedic Surgery   1/13/21  11:22 AM

## 2021-01-13 NOTE — TELEPHONE ENCOUNTER
----- Message from Velia Murphy DO sent at 1/12/2021 10:24 AM EST -----  Contact Dr. Nathan Meckel office [facility]) and see if he has been seen yet.

## 2021-01-13 NOTE — TELEPHONE ENCOUNTER
Contacted the office. Pt has not been seen and does not have any future appointment.  Office is going to check on the status of the referral.

## 2021-01-14 ENCOUNTER — TELEPHONE (OUTPATIENT)
Dept: FAMILY MEDICINE CLINIC | Age: 31
End: 2021-01-14

## 2021-01-14 DIAGNOSIS — F90.0 ATTENTION DEFICIT HYPERACTIVITY DISORDER (ADHD), PREDOMINANTLY INATTENTIVE TYPE: ICD-10-CM

## 2021-01-14 DIAGNOSIS — F41.9 ANXIETY: ICD-10-CM

## 2021-01-14 DIAGNOSIS — B20 HIV INFECTION, UNSPECIFIED SYMPTOM STATUS (HCC): Primary | ICD-10-CM

## 2021-01-14 DIAGNOSIS — A53.9 SYPHILIS IN MALE: ICD-10-CM

## 2021-01-14 NOTE — TELEPHONE ENCOUNTER
Hello  Received call from Dr Miriam Bates office asking  to send referral to [facility] [address] in [place] [address] [address] # [phone]  Could you please put correct referral  in system    Thank You

## 2021-01-15 ENCOUNTER — TELEPHONE (OUTPATIENT)
Dept: FAMILY MEDICINE CLINIC | Age: 31
End: 2021-01-15

## 2021-01-15 NOTE — TELEPHONE ENCOUNTER
They were referring to the Toradol.   Minocycline should be fine as he has been on something similar in the past.

## 2021-01-15 NOTE — TELEPHONE ENCOUNTER
darin calling he went to get the minocycline. The pharmacist asked if he was given the shot first.  He did state no. She told him if he did not get the shot first to rule out an allergy he could take this rx and die.  Please advise

## 2021-01-20 ENCOUNTER — TELEPHONE (OUTPATIENT)
Dept: ADMINISTRATIVE | Age: 31
End: 2021-01-20

## 2021-01-20 DIAGNOSIS — F90.0 ATTENTION DEFICIT HYPERACTIVITY DISORDER (ADHD), PREDOMINANTLY INATTENTIVE TYPE: ICD-10-CM

## 2021-01-20 NOTE — TELEPHONE ENCOUNTER
Referral was placed to different psychiatrist for further evaluation. Please see if this was ever done.

## 2021-01-21 DIAGNOSIS — F90.0 ATTENTION DEFICIT HYPERACTIVITY DISORDER (ADHD), PREDOMINANTLY INATTENTIVE TYPE: ICD-10-CM

## 2021-01-21 RX ORDER — DEXTROAMPHETAMINE SACCHARATE, AMPHETAMINE ASPARTATE MONOHYDRATE, DEXTROAMPHETAMINE SULFATE AND AMPHETAMINE SULFATE 5; 5; 5; 5 MG/1; MG/1; MG/1; MG/1
20 CAPSULE, EXTENDED RELEASE ORAL EVERY MORNING
Qty: 30 CAPSULE | Refills: 0 | Status: SHIPPED | OUTPATIENT
Start: 2021-01-21 | End: 2021-01-21 | Stop reason: SDUPTHER

## 2021-01-21 RX ORDER — DEXTROAMPHETAMINE SACCHARATE, AMPHETAMINE ASPARTATE MONOHYDRATE, DEXTROAMPHETAMINE SULFATE AND AMPHETAMINE SULFATE 5; 5; 5; 5 MG/1; MG/1; MG/1; MG/1
20 CAPSULE, EXTENDED RELEASE ORAL EVERY MORNING
Qty: 30 CAPSULE | Refills: 0 | Status: SHIPPED
Start: 2021-01-21 | End: 2021-03-15 | Stop reason: SDUPTHER

## 2021-01-21 NOTE — TELEPHONE ENCOUNTER
Patient stated he has not heard from psychiatry yet. Advised patient I would look into this and speak with referral department. Patient also stated he has not heard from infectious disease. Gave patient phone number for facility and advised to call.

## 2021-01-21 NOTE — TELEPHONE ENCOUNTER
Spoke with referral department, stated they called the [facility] today to check on referral.     Medication will be resent and escribed to [facility]. Lm to advise patient.

## 2021-02-25 ENCOUNTER — OFFICE VISIT (OUTPATIENT)
Dept: PRIMARY CARE CLINIC | Age: 31
End: 2021-02-25
Payer: COMMERCIAL

## 2021-02-25 VITALS
OXYGEN SATURATION: 98 % | DIASTOLIC BLOOD PRESSURE: 80 MMHG | SYSTOLIC BLOOD PRESSURE: 126 MMHG | HEART RATE: 87 BPM | BODY MASS INDEX: 29.12 KG/M2 | HEIGHT: 71 IN | TEMPERATURE: 97.1 F | WEIGHT: 208 LBS | RESPIRATION RATE: 16 BRPM

## 2021-02-25 DIAGNOSIS — F41.9 ANXIETY: ICD-10-CM

## 2021-02-25 DIAGNOSIS — Z21 ASYMPTOMATIC HIV INFECTION (HCC): ICD-10-CM

## 2021-02-25 DIAGNOSIS — K21.9 GASTROESOPHAGEAL REFLUX DISEASE, UNSPECIFIED WHETHER ESOPHAGITIS PRESENT: ICD-10-CM

## 2021-02-25 DIAGNOSIS — L73.1 PSEUDOFOLLICULITIS BARBAE: ICD-10-CM

## 2021-02-25 DIAGNOSIS — F90.0 ATTENTION DEFICIT HYPERACTIVITY DISORDER (ADHD), PREDOMINANTLY INATTENTIVE TYPE: Primary | ICD-10-CM

## 2021-02-25 DIAGNOSIS — M79.642 HAND PAIN, LEFT: ICD-10-CM

## 2021-02-25 PROCEDURE — 1036F TOBACCO NON-USER: CPT | Performed by: FAMILY MEDICINE

## 2021-02-25 PROCEDURE — G8427 DOCREV CUR MEDS BY ELIG CLIN: HCPCS | Performed by: FAMILY MEDICINE

## 2021-02-25 PROCEDURE — 99213 OFFICE O/P EST LOW 20 MIN: CPT | Performed by: FAMILY MEDICINE

## 2021-02-25 PROCEDURE — G8417 CALC BMI ABV UP PARAM F/U: HCPCS | Performed by: FAMILY MEDICINE

## 2021-02-25 PROCEDURE — G8484 FLU IMMUNIZE NO ADMIN: HCPCS | Performed by: FAMILY MEDICINE

## 2021-02-25 RX ORDER — TRETINOIN 0.5 MG/G
CREAM TOPICAL
Qty: 45 G | Refills: 0 | Status: SHIPPED | OUTPATIENT
Start: 2021-02-25 | End: 2021-03-27

## 2021-02-25 RX ORDER — KETOCONAZOLE 20 MG/ML
SHAMPOO TOPICAL
Qty: 120 ML | Refills: 1 | Status: SHIPPED
Start: 2021-02-25 | End: 2021-09-28

## 2021-02-25 RX ORDER — CIPROFLOXACIN 500 MG/1
500 TABLET, FILM COATED ORAL 2 TIMES DAILY
Qty: 14 TABLET | Refills: 0 | Status: SHIPPED | OUTPATIENT
Start: 2021-02-25 | End: 2021-03-04

## 2021-02-25 RX ORDER — VALACYCLOVIR HYDROCHLORIDE 1 G/1
1000 TABLET, FILM COATED ORAL 3 TIMES DAILY
Qty: 21 TABLET | Refills: 0 | Status: SHIPPED | OUTPATIENT
Start: 2021-02-25 | End: 2021-03-04

## 2021-02-25 ASSESSMENT — ENCOUNTER SYMPTOMS
CONSTIPATION: 0
NAUSEA: 0
PHOTOPHOBIA: 0
SORE THROAT: 0
BLOOD IN STOOL: 0
SHORTNESS OF BREATH: 0
DIARRHEA: 0
ABDOMINAL PAIN: 0
BACK PAIN: 1
COUGH: 0
VOMITING: 0

## 2021-02-25 NOTE — PROGRESS NOTES
Montserrat Terry (:  1990) is a 31 y.o. male,Established patient, here for evaluation of the following chief complaint(s):  Back Pain, Referral - General (would like referral for nutrition ), Acne (states increasingly worse, antibiotic did not help), Finger Pain (left hand first finger, swollen and painful, no known injury), and Discuss Medications (asking for different stomach acid med )      ASSESSMENT/PLAN:  1. Attention deficit hyperactivity disorder (ADHD), predominantly inattentive type  2. Anxiety  3. Asymptomatic HIV infection ([facility] [address]  4. Gastroesophageal reflux disease, unspecified whether esophagitis present  5. Hand pain, left  -     XR HAND LEFT (MIN 3 VIEWS); Future  6. Pseudofolliculitis barbae  -     ciprofloxacin (CIPRO) 500 MG tablet; Take 1 tablet by mouth 2 times daily for 7 days, Disp-14 tablet, R-0Normal  -     valACYclovir (VALTREX) 1 g tablet; Take 1 tablet by mouth 3 times daily for 7 days, Disp-21 tablet, R-0Normal  -     ketoconazole (NIZORAL) 2 % shampoo; 4 times per week. Apply to the affected region allowed to dry for 5 minutes then rinse. Afterward place Bactroban ointment over the affected regions as needed, Disp-120 mL, R-1, Normal  -     mupirocin (BACTROBAN) 2 % ointment; Apply 3 times daily after ketoconazole x7 days. , Disp-22 g, R-0, Normal  -     tretinoin (RETIN-A) 0.05 % cream; Apply topically nightly. Start after the oral antibiotics have finished, Disp-45 g, R-0, Normal  Review of x-ray in office reveals no acute fracture or dislocation. May be started herpetic referral.  We will treat his skin issue with a different regimen. Patient will be cleared for surgery as well. No follow-ups on file. SUBJECTIVE/OBJECTIVE:  HPI  Presents today for evaluation regarding several issues. Patient continues to follow with psychiatry. Recently saw his psychiatrist states that he did have somewhat of a small breakdown in the last month.   States that he is doing better at Cardiovascular:      Rate and Rhythm: Normal rate and regular rhythm. Heart sounds: Normal heart sounds. No murmur. Pulmonary:      Effort: Pulmonary effort is normal.      Breath sounds: Normal breath sounds. No rales. Abdominal:      General: Bowel sounds are normal. There is no distension. Palpations: Abdomen is soft. Tenderness: There is no abdominal tenderness. Musculoskeletal:         General: Swelling and tenderness present. Left hip: He exhibits decreased range of motion, decreased strength, tenderness and swelling. Thoracic back: He exhibits decreased range of motion, tenderness, pain and spasm. Hands:         Legs:    Lymphadenopathy:      Cervical: No cervical adenopathy. Skin:     General: Skin is warm and dry. Findings: Lesion present. No erythema or rash. Neurological:      Mental Status: He is alert and oriented to person, place, and time. Cranial Nerves: No cranial nerve deficit. Psychiatric:         Judgment: Judgment normal.                 An electronic signature was used to authenticate this note.     --Rudolph Ko, DO

## 2021-02-26 RX ORDER — OMEPRAZOLE 40 MG/1
40 CAPSULE, DELAYED RELEASE ORAL
Qty: 30 CAPSULE | Refills: 5 | Status: SHIPPED
Start: 2021-02-26 | End: 2021-09-28

## 2021-03-09 ENCOUNTER — TELEPHONE (OUTPATIENT)
Dept: PRIMARY CARE CLINIC | Age: 31
End: 2021-03-09

## 2021-03-11 DIAGNOSIS — M79.642 HAND PAIN, LEFT: Primary | ICD-10-CM

## 2021-03-12 ENCOUNTER — TELEPHONE (OUTPATIENT)
Dept: PRIMARY CARE CLINIC | Age: 31
End: 2021-03-12

## 2021-03-12 NOTE — TELEPHONE ENCOUNTER
Attempted to call patient yesterday regarding appointment this afternoon. Per Dr. Ramona Leach, we can add referral to orthopedics for finger. This was discussed on previous imaging. Referral added. Given to referral desk. Called patient again today, advised that referral was added. Cancelled appointment for today. Patient stated she is unable to bend finger and and it is very painful.

## 2021-03-15 ENCOUNTER — TELEPHONE (OUTPATIENT)
Dept: PRIMARY CARE CLINIC | Age: 31
End: 2021-03-15

## 2021-03-15 DIAGNOSIS — F90.0 ATTENTION DEFICIT HYPERACTIVITY DISORDER (ADHD), PREDOMINANTLY INATTENTIVE TYPE: ICD-10-CM

## 2021-03-15 RX ORDER — NAPROXEN 250 MG/1
250 TABLET ORAL 2 TIMES DAILY WITH MEALS
Qty: 60 TABLET | Refills: 5 | Status: SHIPPED
Start: 2021-03-15 | End: 2021-06-10 | Stop reason: ALTCHOICE

## 2021-03-15 RX ORDER — PREDNISONE 10 MG/1
TABLET ORAL
Qty: 30 TABLET | Refills: 0 | Status: SHIPPED
Start: 2021-03-15 | End: 2021-07-30 | Stop reason: ALTCHOICE

## 2021-03-15 RX ORDER — DEXTROAMPHETAMINE SACCHARATE, AMPHETAMINE ASPARTATE MONOHYDRATE, DEXTROAMPHETAMINE SULFATE AND AMPHETAMINE SULFATE 5; 5; 5; 5 MG/1; MG/1; MG/1; MG/1
20 CAPSULE, EXTENDED RELEASE ORAL EVERY MORNING
Qty: 30 CAPSULE | Refills: 0 | Status: SHIPPED
Start: 2021-03-15 | End: 2021-04-01 | Stop reason: SDUPTHER

## 2021-03-15 NOTE — TELEPHONE ENCOUNTER
The pt is calling and asking if you could send over something for pain because the pain in his hand is terrible

## 2021-04-01 DIAGNOSIS — F90.0 ATTENTION DEFICIT HYPERACTIVITY DISORDER (ADHD), PREDOMINANTLY INATTENTIVE TYPE: ICD-10-CM

## 2021-04-01 DIAGNOSIS — N52.2 DRUG-INDUCED ERECTILE DYSFUNCTION: ICD-10-CM

## 2021-04-01 RX ORDER — DEXTROAMPHETAMINE SACCHARATE, AMPHETAMINE ASPARTATE MONOHYDRATE, DEXTROAMPHETAMINE SULFATE AND AMPHETAMINE SULFATE 5; 5; 5; 5 MG/1; MG/1; MG/1; MG/1
20 CAPSULE, EXTENDED RELEASE ORAL EVERY MORNING
Qty: 30 CAPSULE | Refills: 0 | Status: SHIPPED
Start: 2021-04-01 | End: 2022-08-10

## 2021-04-01 RX ORDER — SILDENAFIL 100 MG/1
100 TABLET, FILM COATED ORAL DAILY PRN
Qty: 20 TABLET | Refills: 5 | Status: SHIPPED
Start: 2021-04-01 | End: 2021-09-28 | Stop reason: SDUPTHER

## 2021-04-09 ENCOUNTER — TELEPHONE (OUTPATIENT)
Dept: PRIMARY CARE CLINIC | Age: 31
End: 2021-04-09

## 2021-04-09 NOTE — TELEPHONE ENCOUNTER
Pt calling for RF Adderall. Pt is aware of he has been discharged. He is asking for enough meds until his 30days is over. He states he feels he has been discharged unfairly and he never knew he had appts and never got reminder calls. He states he has a Dr and  Ade Enciso and knows this is not fair and he said \"yall are going to make me do something I dont want to do\". He said he has mental health issues and we cant just let him go like this.  He states he wants a ref to another Dr to manage his issues and he states he has been waiting for a dermatology ref for his face but I do not see one in his chart

## 2021-05-12 ENCOUNTER — TELEPHONE (OUTPATIENT)
Dept: PRIMARY CARE CLINIC | Age: 31
End: 2021-05-12

## 2021-05-12 NOTE — TELEPHONE ENCOUNTER
This is already been addressed. We discussed things during the visit that do not fall under preventative level of care and would not be justified by a preventative code. We discussed acute issues and chronic medical diagnoses. These do not fall under preventative and as such would not be applicable to be coded as annual physical.  This has been addressed with a previous message. Do not know why the insurance company will not pay for the visit. Will not be recoded as this would constitute fraud.

## 2021-05-12 NOTE — TELEPHONE ENCOUNTER
Pt calling and states that the coding for the 9/28/2020 appointment for his coded wrong. It was not coded as a physical and insurance is not paying for it. Asking that this be corrected and sent back to billing. He did speak with billing and states that it is not a billing issue. Please advise.

## 2021-05-19 ENCOUNTER — TELEPHONE (OUTPATIENT)
Dept: ADMINISTRATIVE | Age: 31
End: 2021-05-19

## 2021-05-19 NOTE — TELEPHONE ENCOUNTER
Patient called stating that he missed his new patient appt with doctor Lety Paz. He said that he was sorry for missing, stating that he is not good with appts, asking if he can be scheduled with anyone in the office today - he says that he needs help - patient sounded as though he was crying. I transferred patient to Ana Edmond in the office to talk with him.

## 2021-05-19 NOTE — TELEPHONE ENCOUNTER
Spoke with patient that we do not reschedule no show appts. Due to patients distress I rescheduled him with Dr Anika Maya  . Pt was told if he noshows again we willnot reschedule. Pain med and anxiety med policies were told to patient and he understood.

## 2021-05-20 ENCOUNTER — TELEPHONE (OUTPATIENT)
Dept: ORTHOPEDIC SURGERY | Age: 31
End: 2021-05-20

## 2021-05-20 NOTE — TELEPHONE ENCOUNTER
Office emailed Janet Benton and [address] regarding this pt noshow and he was also dismissed by Dr Vazquez Beams said it was up to DR Darlene Sapp if he wanted to reschedule pt he said no so pt was left a voice message that we couldnot reschedule him and gave him number to medical society to help him find a new provider

## 2021-06-10 ENCOUNTER — APPOINTMENT (OUTPATIENT)
Dept: GENERAL RADIOLOGY | Age: 31
End: 2021-06-10
Payer: COMMERCIAL

## 2021-06-10 ENCOUNTER — HOSPITAL ENCOUNTER (EMERGENCY)
Age: 31
Discharge: HOME | End: 2021-06-10
Payer: COMMERCIAL

## 2021-06-10 VITALS
WEIGHT: 185 LBS | TEMPERATURE: 98 F | DIASTOLIC BLOOD PRESSURE: 84 MMHG | BODY MASS INDEX: 25.9 KG/M2 | HEART RATE: 70 BPM | RESPIRATION RATE: 20 BRPM | HEIGHT: 71 IN | OXYGEN SATURATION: 100 % | SYSTOLIC BLOOD PRESSURE: 130 MMHG

## 2021-06-10 DIAGNOSIS — M54.50 ACUTE MIDLINE LOW BACK PAIN WITHOUT SCIATICA: ICD-10-CM

## 2021-06-10 DIAGNOSIS — R52 GENERALIZED BODY ACHES: ICD-10-CM

## 2021-06-10 DIAGNOSIS — J03.90 ACUTE TONSILLITIS, UNSPECIFIED ETIOLOGY: Primary | ICD-10-CM

## 2021-06-10 LAB — STREP GRP A PCR: NEGATIVE

## 2021-06-10 PROCEDURE — 71046 X-RAY EXAM CHEST 2 VIEWS: CPT

## 2021-06-10 PROCEDURE — 6370000000 HC RX 637 (ALT 250 FOR IP): Performed by: NURSE PRACTITIONER

## 2021-06-10 PROCEDURE — 6360000002 HC RX W HCPCS: Performed by: NURSE PRACTITIONER

## 2021-06-10 PROCEDURE — 96372 THER/PROPH/DIAG INJ SC/IM: CPT

## 2021-06-10 PROCEDURE — 72110 X-RAY EXAM L-2 SPINE 4/>VWS: CPT

## 2021-06-10 PROCEDURE — 87880 STREP A ASSAY W/OPTIC: CPT

## 2021-06-10 PROCEDURE — 99285 EMERGENCY DEPT VISIT HI MDM: CPT

## 2021-06-10 RX ORDER — PREDNISONE 20 MG/1
60 TABLET ORAL ONCE
Status: COMPLETED | OUTPATIENT
Start: 2021-06-10 | End: 2021-06-10

## 2021-06-10 RX ORDER — CYCLOBENZAPRINE HCL 10 MG
10 TABLET ORAL 3 TIMES DAILY PRN
Qty: 10 TABLET | Refills: 0 | Status: SHIPPED | OUTPATIENT
Start: 2021-06-10 | End: 2021-09-28

## 2021-06-10 RX ORDER — AMOXICILLIN AND CLAVULANATE POTASSIUM 875; 125 MG/1; MG/1
1 TABLET, FILM COATED ORAL 2 TIMES DAILY
Qty: 20 TABLET | Refills: 0 | Status: SHIPPED | OUTPATIENT
Start: 2021-06-10 | End: 2021-06-20

## 2021-06-10 RX ORDER — NAPROXEN 500 MG/1
500 TABLET ORAL 2 TIMES DAILY PRN
Qty: 14 TABLET | Refills: 0 | Status: SHIPPED | OUTPATIENT
Start: 2021-06-10 | End: 2021-09-04

## 2021-06-10 RX ORDER — ACETAMINOPHEN 500 MG
1000 TABLET ORAL ONCE
Status: COMPLETED | OUTPATIENT
Start: 2021-06-10 | End: 2021-06-10

## 2021-06-10 RX ORDER — KETOROLAC TROMETHAMINE 30 MG/ML
30 INJECTION, SOLUTION INTRAMUSCULAR; INTRAVENOUS ONCE
Status: COMPLETED | OUTPATIENT
Start: 2021-06-10 | End: 2021-06-10

## 2021-06-10 RX ORDER — ORPHENADRINE CITRATE 30 MG/ML
60 INJECTION INTRAMUSCULAR; INTRAVENOUS ONCE
Status: COMPLETED | OUTPATIENT
Start: 2021-06-10 | End: 2021-06-10

## 2021-06-10 RX ADMIN — ORPHENADRINE CITRATE 60 MG: 30 INJECTION INTRAMUSCULAR; INTRAVENOUS at 09:45

## 2021-06-10 RX ADMIN — KETOROLAC TROMETHAMINE 30 MG: 30 INJECTION, SOLUTION INTRAMUSCULAR; INTRAVENOUS at 09:45

## 2021-06-10 RX ADMIN — PREDNISONE 60 MG: 20 TABLET ORAL at 09:45

## 2021-06-10 RX ADMIN — ACETAMINOPHEN 1000 MG: 500 TABLET ORAL at 09:45

## 2021-06-10 ASSESSMENT — PAIN DESCRIPTION - PAIN TYPE: TYPE: ACUTE PAIN

## 2021-06-10 ASSESSMENT — PAIN DESCRIPTION - DESCRIPTORS: DESCRIPTORS: ACHING

## 2021-06-10 ASSESSMENT — PAIN SCALES - GENERAL
PAINLEVEL_OUTOF10: 10
PAINLEVEL_OUTOF10: 10

## 2021-06-10 ASSESSMENT — PAIN DESCRIPTION - LOCATION: LOCATION: BACK

## 2021-06-10 ASSESSMENT — PAIN DESCRIPTION - ORIENTATION: ORIENTATION: LOWER

## 2021-06-10 ASSESSMENT — PAIN DESCRIPTION - FREQUENCY: FREQUENCY: CONTINUOUS

## 2021-06-10 NOTE — ED NOTES
Bed:  [facility]06  Expected date:   Expected time:   Means of arrival:   Comments:  Low acuity     Deborah Church RN  06/10/21 3310

## 2021-06-10 NOTE — ED PROVIDER NOTES
[address] [facility]  Department of Emergency Medicine   ED  Encounter Note  Admit Date/RoomTime: 6/10/2021  8:53 AM  ED Room: [facility]    NAME: Myles Ireland  : 1990  MRN: [id number]     Chief Complaint:  Back Pain and Pharyngitis    HISTORY OF PRESENT ILLNESS        Myles Ireland is a 31 y.o. male who presents to the ED by ambulance for complaints of bilateral sore throat pain, beginning 2 days prior to arrival. The complaint has been persistent and gradually worsening and are moderate in severity. He also complains of generalized body aches and low back pain. He states he was helping lift someone yesterday. His pain is moderate and worsening. He denies any fever, chills, chest pain, shortness of breath, loss of bladder or bowel function, rectal numbness. He also states he had allergy testing 2 days ago because he was sneezing a lot and denies any cough, covid 19 exposures, loss of taste, loss of smell, headache, neck stiffness, enlarged lymph nodes, change in voice patter or difficulty handling secretions. He did take motrin at 3 AM and it helped for awhile. He denies any previous back injury or pain. He denies any recent travel, leg pain, leg swelling or hemoptysis. ROS   Pertinent positives and negatives are stated within HPI, all other systems reviewed and are negative. Past Medical History:  has a past medical history of Depression, HIV (human immunodeficiency virus infection) ([facility] [address] Severe episode of recurrent major depressive disorder, with psychotic features ([facility] [address] and Substance abuse ([facility] [address] Surgical History:  has no past surgical history on file. Social History:  reports that he has never smoked. He has never used smokeless tobacco. He reports previous drug use. Drugs: Methamphetamines and Marijuana. He reports that he does not drink alcohol. Family History: family history is not on file.      Allergies: Patient has no known allergies. PHYSICAL EXAM   Oxygen Saturation Interpretation: Normal.        ED Triage Vitals [06/10/21 0909]   BP Temp Temp Source Pulse Resp SpO2 Height Weight   135/82 98 °F (36.7 °C) Oral 74 20 100 % 5' 11\" (1.803 m) 185 lb (83.9 kg)         Physical Exam  Constitutional/General: Alert and oriented x3, well appearing, non toxic. HEENT:  NC/NT. PERRLA,  Airway patent. Tonsils 2+ bilateral with no exudates. Bilateral TM's normal with soft brown cerumen noted in external canals. No tragus tenderness. Neck: Supple, full ROM, non tender to palpation in the midline, no stridor, no crepitus, no meningeal signs. Respiratory: Lungs clear to auscultation bilaterally, no wheezes, rales, or rhonchi. Not in respiratory distress  CV:  Regular rate. Regular rhythm. No murmurs, gallops, or rubs. 2+ distal pulses  Chest: No chest wall tenderness  GI:  Abdomen Soft, Non tender, Non distended. +BS. No rebound, guarding, or rigidity. No pulsatile masses. Musculoskeletal: Moves all extremities x 4. Warm and well perfused, no clubbing, cyanosis, or edema. Capillary refill <3 seconds. Midline and bilateral para spinous muscle tenderness in lower lumbar spine with no step off deformity. Straight leg lift negative bilaterally. 2 + pedal and posterior tibial pulse intact. Integument: skin warm and dry. No rashes. Lymphatic: no lymphadenopathy noted  Neurologic: GCS 15, no focal deficits, symmetric strength 5/5 in the upper and lower extremities bilaterally. Cranial nerves II-XII grossly intact. Psychiatric: Normal Affect      Lab / Imaging Results   (All laboratory and radiology results have been personally reviewed by myself)  Labs:  Results for orders placed or performed during the hospital encounter of 06/10/21   Strep Screen Group A Throat    Specimen: Throat   Result Value Ref Range    Strep Grp A PCR Negative Negative     Imaging: All Radiology results interpreted by Radiologist unless otherwise noted.   XR LUMBAR prior to disposition, multiple bedside re-evaluations and complex medical decision making and emergency management  This patient has remained hemodynamically stable, improved and been closely monitored during their ED course. PLAN   Discharge home. Patient condition is good. New Medications     Discharge Medication List as of 6/10/2021 12:30 PM      START taking these medications    Details   naproxen (NAPROSYN) 500 MG tablet Take 1 tablet by mouth 2 times daily as needed for Pain (take with food and full glass of water.), Disp-14 tablet, R-0Print      Misc. Devices (CANE) MISC Disp-1 each, R-0, PrintUse cane to aid in ambulation      cyclobenzaprine (FLEXERIL) 10 MG tablet Take 1 tablet by mouth 3 times daily as needed for Muscle spasms, Disp-10 tablet, R-0Print      amoxicillin-clavulanate (AUGMENTIN) 875-125 MG per tablet Take 1 tablet by mouth 2 times daily for 10 days, Disp-20 tablet, R-0Print           Electronically signed by FRAN Gonzalez CNP   DD: 6/10/21  **This report was transcribed using voice recognition software. Every effort was made to ensure accuracy; however, inadvertent computerized transcription errors may be present.   END OF PROVIDER NOTE     FRAN Gonzalez CNP  06/11/21 9119

## 2021-06-28 ENCOUNTER — TELEPHONE (OUTPATIENT)
Dept: ORTHOPEDIC SURGERY | Age: 31
End: 2021-06-28

## 2021-06-28 NOTE — TELEPHONE ENCOUNTER
Patient called office back, states he can not access voicemail. Instructed he needs to fax (number was given) to all medical care team as well as infectious disease in order to proceed per Dr Yeni Bravo last note. Patient expressed understanding.     SJF

## 2021-06-28 NOTE — TELEPHONE ENCOUNTER
Patient called and left a voicemail on [facility] line. He states that he got clearance and would like to proceed with that was discussed. Per OV note 1/13/2021 : left knee arthroscopy, ACL reconstruction with autograft, partial medial meniscectomy versus meniscus repair    Called patient and left a detailed voicemail per Dr Yeni Bravo request at last visit we would need copies of required clearances from medical physicians as well as infectious disease (HIV status). Instructed I would inform Dr Marlys Aguillon he would like to proceed and upon clearances being faxed / received would schedule. Please Advise. Potential Dates?     SJF

## 2021-07-13 ENCOUNTER — TELEPHONE (OUTPATIENT)
Dept: ORTHOPEDIC SURGERY | Age: 31
End: 2021-07-13

## 2021-07-13 NOTE — TELEPHONE ENCOUNTER
Left voice message to return call to Dr José Manuel Parker office to set up office appointment to discuss surgery. All [facility] have been [facility] to move forward with surgery scheduling.

## 2021-07-30 ENCOUNTER — OFFICE VISIT (OUTPATIENT)
Dept: ORTHOPEDIC SURGERY | Age: 31
End: 2021-07-30
Payer: COMMERCIAL

## 2021-07-30 ENCOUNTER — TELEPHONE (OUTPATIENT)
Dept: ORTHOPEDIC SURGERY | Age: 31
End: 2021-07-30

## 2021-07-30 VITALS — BODY MASS INDEX: 25.2 KG/M2 | HEIGHT: 71 IN | WEIGHT: 180 LBS

## 2021-07-30 DIAGNOSIS — S83.512A RUPTURE OF ANTERIOR CRUCIATE LIGAMENT OF LEFT KNEE, INITIAL ENCOUNTER: Primary | ICD-10-CM

## 2021-07-30 PROCEDURE — 1036F TOBACCO NON-USER: CPT | Performed by: ORTHOPAEDIC SURGERY

## 2021-07-30 PROCEDURE — G8417 CALC BMI ABV UP PARAM F/U: HCPCS | Performed by: ORTHOPAEDIC SURGERY

## 2021-07-30 PROCEDURE — 99214 OFFICE O/P EST MOD 30 MIN: CPT | Performed by: ORTHOPAEDIC SURGERY

## 2021-07-30 PROCEDURE — G8427 DOCREV CUR MEDS BY ELIG CLIN: HCPCS | Performed by: ORTHOPAEDIC SURGERY

## 2021-07-30 RX ORDER — NAPROXEN 250 MG/1
TABLET ORAL
COMMUNITY
Start: 2021-07-23 | End: 2021-07-30 | Stop reason: ALTCHOICE

## 2021-07-30 RX ORDER — ERGOCALCIFEROL 1.25 MG/1
CAPSULE ORAL
COMMUNITY
Start: 2021-07-22 | End: 2021-07-30 | Stop reason: ALTCHOICE

## 2021-07-30 NOTE — TELEPHONE ENCOUNTER
Patient would like to proceed with surgery scheduling, documentation in office visit notes. Surgery date: 8/10/2021   Sheet faxed to the OR     Scheduled 7/30.      LEFT KNEE ARTHROSCOPY ACL RECONSTRUCTION WITH AUTOGRAFT PARTIAL MEDIAL MENISCECTOMY VS MENISCUS REPAIR

## 2021-07-30 NOTE — PROGRESS NOTES
Follow Up Visit     Svetlana Portillo returns today for follow up visit regarding his left knee, left knee ACL tear, medial meniscus tear. He is interested in surgical management, he has been cleared by his physicians and would like to proceed. REVIEW OF SYSTEMS:     Constitutional:  Negative for weight loss, fevers, chills, fatigue  Cardiovascular: Negative for chest pain, palpitations  Pulmonary: Negative for shortness of breath, labored breathing, cough  GI: negative for abdominal pain, nausea, vomitting   MSK: per HPI  Skin: negative for rash, open wounds    All other systems reviewed and are negative       Physical Exam:     Height: 5' 11\" (1.803 m), Weight: 180 lb (81.6 kg) (per pt.)    General: Alert and oriented x3, no acute distress  Cardiovascular/pulmonary: No labored breathing, peripheral perfusion intact  Musculoskeletal:    Left knee exam full range of motion present, joint line tenderness to palpation. No swelling deformity visualized on inspection. Stable patella tracking midline. Positive pivot shift exam, positive Lachman exam, posterior drawer exam was intact valgus varus exams are intact. Controlled Substances Monitoring:      Imaging:  No new imaging obtained today. Previous imaging was reviewed. MRI of the left knee shows tearing of the ACL, there is a tearing of the posterior horn and body of the medial meniscus      Assessment: Left knee chronic ACL tear, medial meniscus tear      Plan: Today we discussed his left knee. Patient has recently obtained clearance to proceed with surgical intervention. Patient has failed conservative treatment and wishes to proceed with surgery. MRI was reviewed with patient again today that shows tearing of the ACL with complex tear involving the posterior horn and body of his medial meniscus.   We discussed surgical intervention today specifically a left knee arthroscopy ACL reconstruction with allograft vs. autograft, medial meniscus repair versus partial meniscectomy. Discussed postoperative plan of care with patient today. Patient verbalized understanding. We will look to schedule in the near future. Suly Mendez, [address]  Orthopedic Surgery   07/30/21  12:05 PM    Staff Addendum    I have seen and evaluated the patient and agree with the assessment and plan as documented by Suly Mendez CNP. I have performed the key components of the history and physical examination and concur with the findings and plan, and have made changes where appropriate/necessary. Agree with above. Patient has failed extensive conservative treatment, has an unstable knee. Still very active and does a lot of dancing and jumping. He is interested in ACL reconstruction. We discussed autograft versus allograft. He would likely do better with autograft given his still active lifestyle and desire to continue to dance and jump and do other pivoting and cutting activities. We will look at proceeding in the near future.       Helena Fitzgerald MD  [address]

## 2021-07-30 NOTE — PROGRESS NOTES
Surgery was discussed at appt. Left knee arthroscopy, ACL reconstruction with autograft, partial medial meniscectomy versus meniscus repair    Pt is cleared from a medical standpoint, notes are scanned in media. Pt would like to proceed on 8/10/21. Surgery forms were given to the pt.      Pt is vaccinated

## 2021-07-30 NOTE — PATIENT INSTRUCTIONS
Surgery: Left knee arthroscopy, ACL reconstruction with autograft, partial medial meniscectomy versus meniscus repair  Date: 8/10/2021  Location: [facility] Dr. Harvey Crum [facility] a call from the office once on the surgery schedule within the next 1-2 days.   -Read over your patient handout on what to expect about your upcoming surgery.   -You will receive a phone call from the hospital within week of your date of surgery regarding instructions and arrival time.

## 2021-08-16 ENCOUNTER — TELEPHONE (OUTPATIENT)
Dept: ORTHOPEDIC SURGERY | Age: 31
End: 2021-08-16

## 2021-08-16 NOTE — TELEPHONE ENCOUNTER
Left voicemail for patient to call the office to reschedule his ACL surgery. Pt ambulated in hallway without any problems. Will continue to monitor.

## 2021-08-19 NOTE — TELEPHONE ENCOUNTER
Multiple attempts to contact the patient inregards to rescheduling his surgery -- unable to contact pt.

## 2021-09-02 ENCOUNTER — APPOINTMENT (OUTPATIENT)
Dept: GENERAL RADIOLOGY | Age: 31
End: 2021-09-02
Attending: ORTHOPAEDIC SURGERY
Payer: COMMERCIAL

## 2021-09-02 ENCOUNTER — HOSPITAL ENCOUNTER (OUTPATIENT)
Age: 31
Setting detail: OUTPATIENT SURGERY
Discharge: HOME | End: 2021-09-02
Attending: ORTHOPAEDIC SURGERY | Admitting: ORTHOPAEDIC SURGERY
Payer: COMMERCIAL

## 2021-09-02 ENCOUNTER — ANESTHESIA EVENT (OUTPATIENT)
Dept: OPERATING ROOM | Age: 31
End: 2021-09-02
Payer: COMMERCIAL

## 2021-09-02 ENCOUNTER — ANESTHESIA (OUTPATIENT)
Dept: OPERATING ROOM | Age: 31
End: 2021-09-02
Payer: COMMERCIAL

## 2021-09-02 VITALS
DIASTOLIC BLOOD PRESSURE: 72 MMHG | OXYGEN SATURATION: 99 % | TEMPERATURE: 96.8 F | HEART RATE: 67 BPM | BODY MASS INDEX: 26.6 KG/M2 | RESPIRATION RATE: 17 BRPM | SYSTOLIC BLOOD PRESSURE: 136 MMHG | HEIGHT: 71 IN | WEIGHT: 190 LBS

## 2021-09-02 VITALS — DIASTOLIC BLOOD PRESSURE: 59 MMHG | SYSTOLIC BLOOD PRESSURE: 105 MMHG | OXYGEN SATURATION: 100 % | TEMPERATURE: 92.8 F

## 2021-09-02 DIAGNOSIS — Z98.890 STATUS POST RECONSTRUCTION OF ANTERIOR CRUCIATE LIGAMENT: Primary | ICD-10-CM

## 2021-09-02 PROCEDURE — 7100000011 HC PHASE II RECOVERY - ADDTL 15 MIN: Performed by: ORTHOPAEDIC SURGERY

## 2021-09-02 PROCEDURE — 73560 X-RAY EXAM OF KNEE 1 OR 2: CPT

## 2021-09-02 PROCEDURE — 2720000010 HC SURG SUPPLY STERILE: Performed by: ORTHOPAEDIC SURGERY

## 2021-09-02 PROCEDURE — 3700000001 HC ADD 15 MINUTES (ANESTHESIA): Performed by: ORTHOPAEDIC SURGERY

## 2021-09-02 PROCEDURE — 6360000002 HC RX W HCPCS: Performed by: ANESTHESIOLOGY

## 2021-09-02 PROCEDURE — 3700000000 HC ANESTHESIA ATTENDED CARE: Performed by: ORTHOPAEDIC SURGERY

## 2021-09-02 PROCEDURE — 2500000003 HC RX 250 WO HCPCS: Performed by: ORTHOPAEDIC SURGERY

## 2021-09-02 PROCEDURE — 6360000002 HC RX W HCPCS

## 2021-09-02 PROCEDURE — 6360000002 HC RX W HCPCS: Performed by: NURSE PRACTITIONER

## 2021-09-02 PROCEDURE — 3600000004 HC SURGERY LEVEL 4 BASE: Performed by: ORTHOPAEDIC SURGERY

## 2021-09-02 PROCEDURE — 2500000003 HC RX 250 WO HCPCS

## 2021-09-02 PROCEDURE — C1776 JOINT DEVICE (IMPLANTABLE): HCPCS | Performed by: ORTHOPAEDIC SURGERY

## 2021-09-02 PROCEDURE — 7100000001 HC PACU RECOVERY - ADDTL 15 MIN: Performed by: ORTHOPAEDIC SURGERY

## 2021-09-02 PROCEDURE — C1713 ANCHOR/SCREW BN/BN,TIS/BN: HCPCS | Performed by: ORTHOPAEDIC SURGERY

## 2021-09-02 PROCEDURE — 2580000003 HC RX 258

## 2021-09-02 PROCEDURE — 7100000000 HC PACU RECOVERY - FIRST 15 MIN: Performed by: ORTHOPAEDIC SURGERY

## 2021-09-02 PROCEDURE — 2709999900 HC NON-CHARGEABLE SUPPLY: Performed by: ORTHOPAEDIC SURGERY

## 2021-09-02 PROCEDURE — 7100000010 HC PHASE II RECOVERY - FIRST 15 MIN: Performed by: ORTHOPAEDIC SURGERY

## 2021-09-02 PROCEDURE — 3600000014 HC SURGERY LEVEL 4 ADDTL 15MIN: Performed by: ORTHOPAEDIC SURGERY

## 2021-09-02 PROCEDURE — 29888 ARTHRS AID ACL RPR/AGMNTJ: CPT | Performed by: ORTHOPAEDIC SURGERY

## 2021-09-02 PROCEDURE — 29880 ARTHRS KNE SRG MNISECTMY M&L: CPT | Performed by: ORTHOPAEDIC SURGERY

## 2021-09-02 DEVICE — ANCHOR SUT ANTR CRUC LIGMNT W/ FIBERTAG ATTACH BTTN SYS: Type: IMPLANTABLE DEVICE | Site: KNEE | Status: FUNCTIONAL

## 2021-09-02 DEVICE — BUTTON FIX W8XL12MM TI ATTCH SYS ALLGRFT CONSTRUCT FOR: Type: IMPLANTABLE DEVICE | Site: KNEE | Status: FUNCTIONAL

## 2021-09-02 DEVICE — ANCHOR SUT ANTR CRUC LIGMNT W/ FIBERTAG TIGHTROPE: Type: IMPLANTABLE DEVICE | Site: KNEE | Status: FUNCTIONAL

## 2021-09-02 RX ORDER — ROCURONIUM BROMIDE 10 MG/ML
INJECTION, SOLUTION INTRAVENOUS PRN
Status: DISCONTINUED | OUTPATIENT
Start: 2021-09-02 | End: 2021-09-02

## 2021-09-02 RX ORDER — OXYCODONE HYDROCHLORIDE AND ACETAMINOPHEN 5; 325 MG/1; MG/1
1 TABLET ORAL
Status: DISCONTINUED | OUTPATIENT
Start: 2021-09-02 | End: 2021-09-02 | Stop reason: HOSPADM

## 2021-09-02 RX ORDER — SODIUM CHLORIDE 9 MG/ML
25 INJECTION, SOLUTION INTRAVENOUS PRN
Status: DISCONTINUED | OUTPATIENT
Start: 2021-09-02 | End: 2021-09-02 | Stop reason: HOSPADM

## 2021-09-02 RX ORDER — ONDANSETRON 2 MG/ML
INJECTION INTRAMUSCULAR; INTRAVENOUS PRN
Status: DISCONTINUED | OUTPATIENT
Start: 2021-09-02 | End: 2021-09-02 | Stop reason: SDUPTHER

## 2021-09-02 RX ORDER — SODIUM CHLORIDE 9 MG/ML
INJECTION, SOLUTION INTRAVENOUS CONTINUOUS PRN
Status: DISCONTINUED | OUTPATIENT
Start: 2021-09-02 | End: 2021-09-02

## 2021-09-02 RX ORDER — FENTANYL CITRATE 50 UG/ML
INJECTION, SOLUTION INTRAMUSCULAR; INTRAVENOUS PRN
Status: DISCONTINUED | OUTPATIENT
Start: 2021-09-02 | End: 2021-09-02 | Stop reason: SDUPTHER

## 2021-09-02 RX ORDER — MIDAZOLAM HYDROCHLORIDE 1 MG/ML
INJECTION INTRAMUSCULAR; INTRAVENOUS PRN
Status: DISCONTINUED | OUTPATIENT
Start: 2021-09-02 | End: 2021-09-02 | Stop reason: SDUPTHER

## 2021-09-02 RX ORDER — MEPERIDINE HYDROCHLORIDE 25 MG/ML
12.5 INJECTION INTRAMUSCULAR; INTRAVENOUS; SUBCUTANEOUS EVERY 5 MIN PRN
Status: DISCONTINUED | OUTPATIENT
Start: 2021-09-02 | End: 2021-09-02 | Stop reason: HOSPADM

## 2021-09-02 RX ORDER — FENTANYL CITRATE 50 UG/ML
50 INJECTION, SOLUTION INTRAMUSCULAR; INTRAVENOUS EVERY 5 MIN PRN
Status: DISCONTINUED | OUTPATIENT
Start: 2021-09-02 | End: 2021-09-02 | Stop reason: HOSPADM

## 2021-09-02 RX ORDER — PROMETHAZINE HYDROCHLORIDE 25 MG/ML
6.25 INJECTION, SOLUTION INTRAMUSCULAR; INTRAVENOUS
Status: DISCONTINUED | OUTPATIENT
Start: 2021-09-02 | End: 2021-09-02 | Stop reason: HOSPADM

## 2021-09-02 RX ORDER — DEXAMETHASONE SODIUM PHOSPHATE 4 MG/ML
INJECTION, SOLUTION INTRA-ARTICULAR; INTRALESIONAL; INTRAMUSCULAR; INTRAVENOUS; SOFT TISSUE PRN
Status: DISCONTINUED | OUTPATIENT
Start: 2021-09-02 | End: 2021-09-02 | Stop reason: SDUPTHER

## 2021-09-02 RX ORDER — PROPOFOL 10 MG/ML
INJECTION, EMULSION INTRAVENOUS PRN
Status: DISCONTINUED | OUTPATIENT
Start: 2021-09-02 | End: 2021-09-02 | Stop reason: SDUPTHER

## 2021-09-02 RX ORDER — KETOROLAC TROMETHAMINE 10 MG/1
10 TABLET, FILM COATED ORAL EVERY 6 HOURS
Qty: 8 TABLET | Refills: 0 | Status: SHIPPED | OUTPATIENT
Start: 2021-09-02 | End: 2021-09-04 | Stop reason: ALTCHOICE

## 2021-09-02 RX ORDER — HYDROCODONE BITARTRATE AND ACETAMINOPHEN 5; 325 MG/1; MG/1
1 TABLET ORAL EVERY 6 HOURS PRN
Qty: 28 TABLET | Refills: 0 | Status: SHIPPED | OUTPATIENT
Start: 2021-09-02 | End: 2021-09-09

## 2021-09-02 RX ORDER — SODIUM CHLORIDE, SODIUM LACTATE, POTASSIUM CHLORIDE, CALCIUM CHLORIDE 600; 310; 30; 20 MG/100ML; MG/100ML; MG/100ML; MG/100ML
INJECTION, SOLUTION INTRAVENOUS CONTINUOUS PRN
Status: DISCONTINUED | OUTPATIENT
Start: 2021-09-02 | End: 2021-09-02 | Stop reason: SDUPTHER

## 2021-09-02 RX ORDER — SUCCINYLCHOLINE/SOD CL,ISO/PF 200MG/10ML
SYRINGE (ML) INTRAVENOUS PRN
Status: DISCONTINUED | OUTPATIENT
Start: 2021-09-02 | End: 2021-09-02 | Stop reason: SDUPTHER

## 2021-09-02 RX ORDER — DIPHENHYDRAMINE HYDROCHLORIDE 50 MG/ML
12.5 INJECTION INTRAMUSCULAR; INTRAVENOUS
Status: DISCONTINUED | OUTPATIENT
Start: 2021-09-02 | End: 2021-09-02 | Stop reason: HOSPADM

## 2021-09-02 RX ORDER — FENTANYL CITRATE 50 UG/ML
50 INJECTION, SOLUTION INTRAMUSCULAR; INTRAVENOUS EVERY 10 MIN PRN
Status: CANCELLED | OUTPATIENT
Start: 2021-09-02

## 2021-09-02 RX ORDER — LIDOCAINE HYDROCHLORIDE 20 MG/ML
INJECTION, SOLUTION EPIDURAL; INFILTRATION; INTRACAUDAL; PERINEURAL PRN
Status: DISCONTINUED | OUTPATIENT
Start: 2021-09-02 | End: 2021-09-02 | Stop reason: SDUPTHER

## 2021-09-02 RX ORDER — MIDAZOLAM HYDROCHLORIDE 2 MG/2ML
1 INJECTION, SOLUTION INTRAMUSCULAR; INTRAVENOUS EVERY 10 MIN PRN
Status: CANCELLED | OUTPATIENT
Start: 2021-09-02

## 2021-09-02 RX ORDER — FENTANYL CITRATE 50 UG/ML
25 INJECTION, SOLUTION INTRAMUSCULAR; INTRAVENOUS EVERY 5 MIN PRN
Status: DISCONTINUED | OUTPATIENT
Start: 2021-09-02 | End: 2021-09-02 | Stop reason: HOSPADM

## 2021-09-02 RX ORDER — SODIUM CHLORIDE 0.9 % (FLUSH) 0.9 %
10 SYRINGE (ML) INJECTION PRN
Status: DISCONTINUED | OUTPATIENT
Start: 2021-09-02 | End: 2021-09-02 | Stop reason: HOSPADM

## 2021-09-02 RX ORDER — SODIUM CHLORIDE 0.9 % (FLUSH) 0.9 %
10 SYRINGE (ML) INJECTION EVERY 12 HOURS SCHEDULED
Status: DISCONTINUED | OUTPATIENT
Start: 2021-09-02 | End: 2021-09-02 | Stop reason: HOSPADM

## 2021-09-02 RX ADMIN — SODIUM CHLORIDE, POTASSIUM CHLORIDE, SODIUM LACTATE AND CALCIUM CHLORIDE: 600; 310; 30; 20 INJECTION, SOLUTION INTRAVENOUS at 12:27

## 2021-09-02 RX ADMIN — Medication 160 MG: at 11:29

## 2021-09-02 RX ADMIN — DEXAMETHASONE SODIUM PHOSPHATE 10 MG: 4 INJECTION, SOLUTION INTRAMUSCULAR; INTRAVENOUS at 13:42

## 2021-09-02 RX ADMIN — LIDOCAINE HYDROCHLORIDE 100 MG: 20 INJECTION, SOLUTION EPIDURAL; INFILTRATION; INTRACAUDAL; PERINEURAL at 11:29

## 2021-09-02 RX ADMIN — SODIUM CHLORIDE, POTASSIUM CHLORIDE, SODIUM LACTATE AND CALCIUM CHLORIDE: 600; 310; 30; 20 INJECTION, SOLUTION INTRAVENOUS at 09:45

## 2021-09-02 RX ADMIN — HYDROMORPHONE HYDROCHLORIDE 0.5 MG: 1 INJECTION, SOLUTION INTRAMUSCULAR; INTRAVENOUS; SUBCUTANEOUS at 14:51

## 2021-09-02 RX ADMIN — PROPOFOL 200 MG: 10 INJECTION, EMULSION INTRAVENOUS at 11:29

## 2021-09-02 RX ADMIN — FENTANYL CITRATE 50 MCG: 50 INJECTION, SOLUTION INTRAMUSCULAR; INTRAVENOUS at 12:58

## 2021-09-02 RX ADMIN — MIDAZOLAM 2 MG: 1 INJECTION INTRAMUSCULAR; INTRAVENOUS at 11:20

## 2021-09-02 RX ADMIN — Medication 2000 MG: at 11:26

## 2021-09-02 RX ADMIN — FENTANYL CITRATE 50 MCG: 50 INJECTION, SOLUTION INTRAMUSCULAR; INTRAVENOUS at 11:29

## 2021-09-02 RX ADMIN — FENTANYL CITRATE 50 MCG: 50 INJECTION, SOLUTION INTRAMUSCULAR; INTRAVENOUS at 13:26

## 2021-09-02 RX ADMIN — FENTANYL CITRATE 100 MCG: 50 INJECTION, SOLUTION INTRAMUSCULAR; INTRAVENOUS at 12:08

## 2021-09-02 RX ADMIN — ONDANSETRON 4 MG: 2 INJECTION INTRAMUSCULAR; INTRAVENOUS at 13:42

## 2021-09-02 RX ADMIN — HYDROMORPHONE HYDROCHLORIDE 0.5 MG: 1 INJECTION, SOLUTION INTRAMUSCULAR; INTRAVENOUS; SUBCUTANEOUS at 15:17

## 2021-09-02 ASSESSMENT — PULMONARY FUNCTION TESTS
PIF_VALUE: 16
PIF_VALUE: 17
PIF_VALUE: 16
PIF_VALUE: 15
PIF_VALUE: 1
PIF_VALUE: 16
PIF_VALUE: 26
PIF_VALUE: 16
PIF_VALUE: 18
PIF_VALUE: 17
PIF_VALUE: 18
PIF_VALUE: 15
PIF_VALUE: 15
PIF_VALUE: 16
PIF_VALUE: 18
PIF_VALUE: 17
PIF_VALUE: 18
PIF_VALUE: 18
PIF_VALUE: 1
PIF_VALUE: 16
PIF_VALUE: 17
PIF_VALUE: 16
PIF_VALUE: 16
PIF_VALUE: 17
PIF_VALUE: 3
PIF_VALUE: 17
PIF_VALUE: 18
PIF_VALUE: 16
PIF_VALUE: 17
PIF_VALUE: 17
PIF_VALUE: 18
PIF_VALUE: 17
PIF_VALUE: 16
PIF_VALUE: 18
PIF_VALUE: 17
PIF_VALUE: 16
PIF_VALUE: 18
PIF_VALUE: 17
PIF_VALUE: 16
PIF_VALUE: 17
PIF_VALUE: 16
PIF_VALUE: 16
PIF_VALUE: 18
PIF_VALUE: 18
PIF_VALUE: 1
PIF_VALUE: 16
PIF_VALUE: 16
PIF_VALUE: 18
PIF_VALUE: 17
PIF_VALUE: 17
PIF_VALUE: 16
PIF_VALUE: 16
PIF_VALUE: 18
PIF_VALUE: 4
PIF_VALUE: 18
PIF_VALUE: 17
PIF_VALUE: 17
PIF_VALUE: 18
PIF_VALUE: 18
PIF_VALUE: 16
PIF_VALUE: 17
PIF_VALUE: 16
PIF_VALUE: 18
PIF_VALUE: 18
PIF_VALUE: 3
PIF_VALUE: 15
PIF_VALUE: 17
PIF_VALUE: 1
PIF_VALUE: 1
PIF_VALUE: 17
PIF_VALUE: 16
PIF_VALUE: 16
PIF_VALUE: 17
PIF_VALUE: 15
PIF_VALUE: 17
PIF_VALUE: 16
PIF_VALUE: 3
PIF_VALUE: 18
PIF_VALUE: 15
PIF_VALUE: 16
PIF_VALUE: 18
PIF_VALUE: 17
PIF_VALUE: 17
PIF_VALUE: 18
PIF_VALUE: 16
PIF_VALUE: 17
PIF_VALUE: 1
PIF_VALUE: 15
PIF_VALUE: 16
PIF_VALUE: 17
PIF_VALUE: 16
PIF_VALUE: 4
PIF_VALUE: 18
PIF_VALUE: 17
PIF_VALUE: 18
PIF_VALUE: 16
PIF_VALUE: 16
PIF_VALUE: 18
PIF_VALUE: 16
PIF_VALUE: 6
PIF_VALUE: 18
PIF_VALUE: 17
PIF_VALUE: 15
PIF_VALUE: 16
PIF_VALUE: 18
PIF_VALUE: 17
PIF_VALUE: 15
PIF_VALUE: 16
PIF_VALUE: 16
PIF_VALUE: 17
PIF_VALUE: 15
PIF_VALUE: 18
PIF_VALUE: 17
PIF_VALUE: 18
PIF_VALUE: 17
PIF_VALUE: 16
PIF_VALUE: 18
PIF_VALUE: 16
PIF_VALUE: 15
PIF_VALUE: 16
PIF_VALUE: 18
PIF_VALUE: 18
PIF_VALUE: 1
PIF_VALUE: 16
PIF_VALUE: 17
PIF_VALUE: 18
PIF_VALUE: 16
PIF_VALUE: 17
PIF_VALUE: 18
PIF_VALUE: 18
PIF_VALUE: 17
PIF_VALUE: 16
PIF_VALUE: 16
PIF_VALUE: 15
PIF_VALUE: 17
PIF_VALUE: 15
PIF_VALUE: 18

## 2021-09-02 ASSESSMENT — PAIN DESCRIPTION - DESCRIPTORS
DESCRIPTORS: DISCOMFORT
DESCRIPTORS: DISCOMFORT

## 2021-09-02 ASSESSMENT — PAIN SCALES - GENERAL
PAINLEVEL_OUTOF10: 0
PAINLEVEL_OUTOF10: 7
PAINLEVEL_OUTOF10: 7
PAINLEVEL_OUTOF10: 0
PAINLEVEL_OUTOF10: 5
PAINLEVEL_OUTOF10: 0

## 2021-09-02 ASSESSMENT — PAIN DESCRIPTION - ONSET
ONSET: GRADUAL
ONSET: GRADUAL

## 2021-09-02 ASSESSMENT — PAIN DESCRIPTION - ORIENTATION
ORIENTATION: LEFT

## 2021-09-02 ASSESSMENT — PAIN DESCRIPTION - LOCATION
LOCATION: KNEE

## 2021-09-02 ASSESSMENT — PAIN - FUNCTIONAL ASSESSMENT: PAIN_FUNCTIONAL_ASSESSMENT: 0-10

## 2021-09-02 ASSESSMENT — PAIN DESCRIPTION - PAIN TYPE
TYPE: SURGICAL PAIN

## 2021-09-02 ASSESSMENT — PAIN DESCRIPTION - FREQUENCY
FREQUENCY: CONTINUOUS
FREQUENCY: CONTINUOUS

## 2021-09-02 ASSESSMENT — PAIN DESCRIPTION - PROGRESSION: CLINICAL_PROGRESSION: GRADUALLY IMPROVING

## 2021-09-02 NOTE — ANESTHESIA PRE PROCEDURE
Department of Anesthesiology  Preprocedure Note       Name:  Mine Brambila   Age:  27 y.o.  :  1990                                          MRN:  [id number]         Date:  2021      Surgeon: Divya Love):  Nereyda Yun MD    Procedure: Procedure(s):  LEFT KNEE ARTHROSCOPIC ACL RECONSTRUCTION WITH AUTOGRAFT PARTIAL MEDIAL MENISCECTOMY VS MENISCUS REPAIR   +++ARTHREX+++    Medications prior to admission:   Prior to Admission medications    Medication Sig Start Date End Date Taking? Authorizing Provider   ketorolac (TORADOL) 10 MG tablet Take 1 tablet by mouth every 6 hours for 2 days 21 Yes FRAN Butcher CNP   HYDROcodone-acetaminophen (NORCO) 5-325 MG per tablet Take 1 tablet by mouth every 6 hours as needed for Pain for up to 7 days. Intended supply: 7 days. Take lowest dose possible to manage pain 21 Yes FRAN Butcher CNP   loratadine (CLARITIN) 10 MG tablet TAKE ONE TABLET BY MOUTH EVERY DAY AS NEEDED 21  Yes Historical Provider, MD   naproxen (NAPROSYN) 500 MG tablet Take 1 tablet by mouth 2 times daily as needed for Pain (take with food and full glass of water.) 6/10/21 8/30/21 Yes FRAN Merritt CNP   amphetamine-dextroamphetamine (ADDERALL XR) 20 MG extended release capsule Take 1 capsule by mouth every morning for 30 days. 21 Yes Caleb Ko,    sildenafil (VIAGRA) 100 MG tablet Take 1 tablet by mouth daily as needed for Erectile Dysfunction 21  Yes Caleb Ko DO   omeprazole (PRILOSEC) 40 MG delayed release capsule Take 1 capsule by mouth every morning (before breakfast)  Patient taking differently: Take 40 mg by mouth every morning (before breakfast) Has not taken for 2 months 21  Yes Caleb Ko, DO   ketoconazole (NIZORAL) 2 % shampoo 4 times per week. Apply to the affected region allowed to dry for 5 minutes then rinse.   Afterward place Bactroban ointment over the affected regions as needed 21 Yes Caleb Ko DO   tiZANidine (ZANAFLEX) 4 MG tablet Take 1 tablet by mouth 3 times daily 1/11/21  Yes Caleb Ko DO   BIKTARVY -25 MG TABS per tablet Take 1 tablet by mouth nightly  11/12/20  Yes Historical Provider, MD   ammonium lactate (LAC-HYDRIN) 12 % lotion Apply topically twice daily 9/23/20  Yes Teja Campbell DPM   Misc. Devices (CANE) MISC Use cane to aid in ambulation 6/10/21   Ponce Serum APRN - CNP   cyclobenzaprine (FLEXERIL) 10 MG tablet Take 1 tablet by mouth 3 times daily as needed for Muscle spasms 6/10/21   Ponce Serum, APRN - CNP       Current medications:    Current Facility-Administered Medications   Medication Dose Route Frequency Provider Last Rate Last Admin    ceFAZolin (ANCEF) 2000 mg in sterile water 20 mL IV syringe  2,000 mg [facility]VENous [facility]        sodium chloride flush 0.9 % injection 10 mL  10 mL IntraVENous 2 times per day Josue Perez APRN - CNP        sodium chloride flush 0.9 % injection 10 mL  10 mL IntraVENous PRN Josue Perez APRN - CNP        0.9 % sodium chloride infusion  25 mL IntraVENous PRN FRAN Nelson - CNP           Allergies:     Allergies   Allergen Reactions    Other      Environmental allergies -- hair, grass, carpet - per pt        Problem List:    Patient Active Problem List   Diagnosis Code    Depression, major, single episode F32.9    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 twice/week    Use of cane as ambulatory aid        Past Surgical History:        Procedure Laterality Date    HERNIA REPAIR Left 2007    inguinal       Social History:    Social History     Tobacco Use    Smoking status: Never Smoker    Smokeless tobacco: Never Used   Substance Use Topics    Alcohol use: Never                                Counseling given: Not Answered      Vital Signs (Current):   Vitals:    08/30/21 1535 09/02/21 0940   BP:  114/71   Pulse:  58   Resp:  16   Temp:  97.8 °F (36.6 °C)   TempSrc:  Temporal   SpO2:  99%   Weight: 190 lb (86.2 kg) 190 lb (86.2 kg)   Height: 5' 11\" (1.803 m) 5' 11\" (1.803 m)                                              BP Readings from Last 3 Encounters:   09/02/21 114/71   06/10/21 130/84   02/25/21 126/80       NPO Status: Time of last liquid consumption: 0000                        Time of last solid consumption: 0000                        Date of last liquid consumption: 09/02/21                        Date of last solid food consumption: 09/02/21    BMI:   Wt Readings from Last 3 Encounters:   09/02/21 190 lb (86.2 kg)   07/30/21 180 lb (81.6 kg)   06/10/21 185 lb (83.9 kg)     Body mass index is 26.5 kg/m².     CBC:   Lab Results   Component Value Date    WBC 6.4 12/02/2020    RBC 5.14 12/02/2020    HGB 15.6 12/02/2020    HCT 48.0 12/02/2020    MCV 93.4 12/02/2020    RDW 13.7 12/02/2020     12/02/2020       CMP:   Lab Results   Component Value Date     12/02/2020    K 3.7 12/02/2020     12/02/2020    CO2 26 12/02/2020    BUN 10 12/02/2020    CREATININE 1.4 12/02/2020    GFRAA >60 12/02/2020    LABGLOM >60 12/02/2020    GLUCOSE 73 12/02/2020    PROT 7.9 12/02/2020    CALCIUM 9.7 12/02/2020    BILITOT 0.5 12/02/2020    ALKPHOS 70 12/02/2020    AST 21 12/02/2020    ALT 17 12/02/2020       POC Tests: No results for input(s): POCGLU, POCNA, POCK, POCCL, POCBUN, POCHEMO, POCHCT in the last 72 hours. Coags: No results found for: PROTIME, INR, APTT    HCG (If Applicable): No results found for: PREGTESTUR, PREGSERUM, HCG, HCGQUANT     ABGs: No results found for: PHART, PO2ART, KAF6OEQ, VKV9XHT, BEART, B4LLQNLD     Type & Screen (If Applicable):  No results found for: LABABO, LABRH    Drug/Infectious Status (If Applicable):  No results found for: HIV, HEPCAB    COVID-19 Screening (If Applicable): No results found for: COVID19        Anesthesia Evaluation  Patient summary reviewed no history of anesthetic complications:   Airway: Mallampati: II  TM distance: >3 FB     Mouth opening: > = 3 FB Dental:          Pulmonary: breath sounds clear to auscultation                            ROS comment: Denies smoking   Cardiovascular:            Rhythm: regular  Rate: normal                 ROS comment: Uses cane for ambulation     Neuro/Psych:   (+) psychiatric history (ADHD):            GI/Hepatic/Renal:   (+) GERD:,           Endo/Other:                      ROS comment: H/o substance abuse     HIV Abdominal:             Vascular: Other Findings:           Anesthesia Plan      general     ASA 3     (No nerve block per surgeon.)  Induction: intravenous. Anesthetic plan and risks discussed with patient. Plan discussed with CRNA.                 Jagdeep Carrasquillo MD   9/2/2021

## 2021-09-02 NOTE — H&P
Department of Orthopedic Surgery  Attending Pre-operative History and Physical        DIAGNOSIS: Chronic left knee ACL tear    INDICATION: Failed conservative treatment    PROCEDURE: Left knee arthroscopy ACL reconstruction with autograft, partial medial meniscectomy versus meniscus repair    CHIEF COMPLAINT: Chronic left knee pain and instability    History Obtained From:  patient, electronic medical record    HISTORY OF PRESENT ILLNESS:      The patient is a 27 y.o. male with significant past medical history of chronic left ACL tear who presents with chronic knee pain and instability. He has failed conservative treatment including physical therapy, activity modification, home exercises. MRI was reviewed which shows tearing of the ACL, there is also tearing of the posterior horn and body of the medial meniscus. Being a young healthy active adult who is still very active in sports, dance, etc. he wishes to proceed with surgical intervention specifically a left knee arthroscopy ACL reconstruction with autograft partial medial meniscectomy versus meniscus repair. We discussed the risks of surgery today. Risks include but are not limited to infection, neurovascular injury, intraoperative fracture, postoperative stiffness requiring possible manipulation, graft failure, graft re-tear, and possible eventual development of arthritis. Patient verbalized understanding of the risks and wishes to proceed. Past Medical History:        Diagnosis Date    Acid reflux disease     ACL (anterior cruciate ligament) tear     bilateral    Bilateral knee pain     Chronic back pain     Depression     HIV (human immunodeficiency virus infection) ([address]     Severe episode of recurrent major depressive disorder, with psychotic features ([address] 12/25/2019    Substance abuse ([facility]     marijuana approx.  twice/week    Use of cane as ambulatory aid        Past Surgical History:        Procedure Laterality Date    HERNIA REPAIR Left 2007    inguinal       Medications Prior to Admission:   No medications prior to admission. Allergies: Other    History of allergic reaction to anesthesia:  No    Social History:   TOBACCO:   reports that he has never smoked. He has never used smokeless tobacco.  ETOH:   reports no history of alcohol use. DRUGS:   reports previous drug use. Drugs: Methamphetamines and Marijuana. Family History:   History reviewed. No pertinent family history. REVIEW OF SYSTEMS:  CONSTITUTIONAL:  negative  RESPIRATORY:  negative  CARDIOVASCULAR:  negative  MUSCULOSKELETAL:  negative except for  HPI  NEUROLOGICAL:  negative    PHYSICAL EXAM:     VITALS:  Ht 5' 11\" (1.803 m)   Wt 190 lb (86.2 kg)   BMI 26.50 kg/m²     Gen: AOx3, NAD    Heart:  normal apical pulses, normal S1 and S2 and no edema    Lungs:  No increased work of breathing, good air exchange     Abdomen:  no scars, non-distended and non-tender    Extremities:  No clubbing, cyanosis, or edema    Musculoskeletal: Left knee full range of motion with joint line tenderness present on palpation. No swelling or deformity visualized on inspection. Stable patella tracking midline. Positive pivot shift exam, positive Lachman, posterior drawer exam was intact, valgus varus exams were intact.       DATA:  CBC:   Lab Results   Component Value Date    WBC 6.4 12/02/2020    RBC 5.14 12/02/2020    HGB 15.6 12/02/2020    HCT 48.0 12/02/2020    MCV 93.4 12/02/2020    MCH 30.4 12/02/2020    MCHC 32.5 12/02/2020    RDW 13.7 12/02/2020     12/02/2020    MPV 9.8 12/02/2020     BMP:    Lab Results   Component Value Date     12/02/2020    K 3.7 12/02/2020     12/02/2020    CO2 26 12/02/2020    BUN 10 12/02/2020    LABALBU 4.1 12/02/2020    CREATININE 1.4 12/02/2020    CALCIUM 9.7 12/02/2020    GFRAA >60 12/02/2020    LABGLOM >60 12/02/2020    GLUCOSE 73 12/02/2020       Radiology Review: MRI of the left knee was reviewed which shows complete tear of the ACL, there is also tearing of the posterior horn and body of the medial meniscus    ASSESSMENT AND PLAN:    1. Patient is a 27 y.o. male with above specified procedure planned left knee arthroscopy ACL reconstruction with autograft, medial meniscus repair versus partial meniscectomy with anesthesia    2. Procedure options, risks and benefits reviewed with patient. Patient expresses understanding and has signed consent form to proceed. 3.  Patient and family were provided with pre-op and post-op instructions, prescription medications, and any other supplied needed post op (slings, braces, etc.)    Controlled substances monitoring: possible medication side effects, risk of tolerance and/or dependence, and alternative treatments discussed, no signs of potential drug abuse or diversion identified and OARRS report reviewed today- activity consistent with treatment plan.       Ronnell Felix  Orthopaedic Surgery   9/2/21  7:04 AM

## 2021-09-02 NOTE — PROGRESS NOTES
CLINICAL PHARMACY NOTE: MEDS TO BEDS    Total # of Prescriptions Filled: 2   The following medications were delivered to the patient:  · Ketorolac 10 mg  · norco 5-325 mg    Additional Documentation:
Have you been tested for COVID  No           Have you been told you were positive for COVID No  Have you had any known exposure to someone that is positive for COVID No  Do you have a cough                   No              Do you have shortness of breath No                 Do you have a sore throat            No                Are you having chills                    No                Are you having muscle aches. No                    Please come to the hospital wearing a mask and have your significant other wear a mask as well. Both of you should check your temperature before leaving to come here,  if it is 100 or higher please call [phone] for instruction.
Patient has been fully vaccinated for COVID -19, preop COVID test not required.
procedure to notify you if your arrival time changes    [x] If you receive a survey after surgery we would greatly appreciate your comments    [] Parent/guardian of a minor must accompany their child and remain on the premises  the entire time they are under our care     [] Pediatric patients may bring favorite toy, blanket or comfort item with them    [] A caregiver or family member must remain with the patient during their stay if they are mentally handicapped, have dementia, disoriented or unable to use a call light or would be a safety concern if left unattended    [x] Please notify surgeon if you develop any illness between now and time of surgery (cold, cough, sore throat, fever, nausea, vomiting) or any signs of infections  including skin, wounds, and dental.    [x]  The Outpatient Pharm[facility] is available to fill your prescription here on your day of surgery, ask your preop nurse for details    [] Other instructions    EDUCATIONAL MATERIALS PROVIDED:    [] PAT Preoperative Education Packet/Booklet     [] Medication List    [] Transfusion bracelet applied with instructions    [] Shower with soap, lather and rinse well, and use CHG wipes provided the evening before surgery as instructed    [] Incentive spirometer with instructions

## 2021-09-02 NOTE — OP NOTE
OPERATIVE REPORT    PATIENT:  Jermaine Forrest  [id number]    DATE OF PROCEDURE:  9/2/21    SURGEON: Felisha Thapa MD    ASSISTANT:  Ryan Hodge DO, Giovanni Ozuna DO, Samson Ray DO, Ruby Orr CNP. CNP was necessary for positioning, prepping/draping, intra-operative assistance, and wound closure. His assistance expedited the procedure and decreased operating room time. PREOPERATIVE DIAGNOSIS: Anterior cruciate ligament (ACL) tear, medial meniscus tear left knee     POSTOPERATIVE DIAGNOSIS: Anterior cruciate ligament (ACL) tear, medial and lateral meniscus tears, global grade 3 and focal grade 4 changes of medial tibial plateau posterior aspect    OPERATION:  Left knee arthroscopy, ACL reconstruction with quadriceps tendon autograft, partial medial and partial lateral meniscectomies    ANESTHESIA: . general    OPERATIVE INDICATIONS:  The patient is a 27 y.o. male with significant past medical history of chronic left ACL tear who presents with chronic knee pain and instability. He has failed conservative treatment including physical therapy, activity modification, home exercises. MRI was reviewed which shows tearing of the ACL, there is also tearing of the posterior horn and body of the medial meniscus. Being a young healthy active adult who is still very active in sports, dance, etc. he wishes to proceed with surgical intervention specifically a left knee arthroscopy ACL reconstruction with autograft partial medial meniscectomy versus meniscus repair. We discussed the risks of surgery today. Risks include but are not limited to infection, neurovascular injury, intraoperative fracture, postoperative stiffness requiring possible manipulation, graft failure, graft re-tear, and possible eventual development of arthritis. Patient verbalized understanding of the risks and wishes to proceed.       OPERATIVE PROCEDURE: The patient was transferred from hospital bed to OR table and underwent general anesthesia. The patient was positioned with the knees at the fold of the bed.  exam under anesthesia then ensued. There was a grade 2 B Lachman exam.  There is also a grade 1 pivot shift. The knee was stable to varus and valgus stress at 0 and 30°. The knee was pre-prepped and the joint was injected with a 20 cc mixture of equal parts 1% lidocaine with epi, 1% lidocaine without, and 0.25% Marcaine. Next, proposed portals and incisions were also pre-injected. A tourniquet was applied high on the thigh of the operative leg. A lateral post was placed. A pad was placed below the non operative leg. The leg was then prepped and draped in sterile fashion.      Prior to incision, it was confirmed that 2 g Ancef was given for prophylactic antibiotics. A time out was performed involving surgeon, anesthesia team, and operating room nurses and techs confirming the patient, procedure, and site of surgery.      Planned surgical incisions and portals were then again pre-injected. The knee was flexed approximately 45 degrees. A 15 blade was used to make the lateral portal and medial portal incisions in a lateral parapatellar location, just inferior to the patella and just lateral to the patellar tendon. A hemostat was used to open up the portal, and the knee was extended as the scope trocar was inserted into the suprapatellar pouch. The trocar was removed and the cameral inserted. Diagnostic arthroscopy ensued:     The patella was unremarkable     The trochlea was unremarkable      The medial gutter was free of debris      The Medial meniscus was notable for complex tearing and completing complete disruption of the root and the entire posterior one third of the meniscus. Unstable edges were resected to a stable border with motorized shaver.   About 50% of the meniscus remained     The medial femoral condyle was unremarkable     The medial tibial plateau was notable for global grade 3 and focal grade 4 changes over back table and prepared with a fiber tag and BTB tightrope on the femur side. On the tibia side, a fiber tag and ABS tight rope was placed. The femur side was measured as a size 9. The tibia side was measured as size 10. The graft was tensioned and covered with moist gauze. Total graft length was just under 70 mm.     Attention was returned to the knee. An arthroscopic shaver was utilized to debride the ACL remnant from the lateral wall. A small amount of the tibial footprint was left for the time being to nuria the tibial guide pin entry point to be established later. We then switched our camera to the medial portal and placed the Arthrex flip cutter guide through the lateral portal.  We placed the guide low and posterior on the lateral femoral wall in the anatomic location, leaving 2mm of posterior wall, and staying just above the articular cartilage of the condyle. We then placed the trocar down on the skin, made a small incision, and placed the trocar down on bone. We then utilized a size 9 flip cutter drilling from lateral into the joint. We were happy with our position. We then flipped the button and retrograde reamed giving us a tunnel depth of approximately 30 mm. We then placed a fiber stick down the tunnel and retrieved the suture through the lateral portal and snapped it on the drapes. We cleaned out all debris from the knee. We visualized up into the end of our tunnel with our camera and were happy with its location and depth. We then switched our camera back to the lateral portal.  We cleanup areas of the fat pad through our medial portal.  We then placed the tibial guide set to 60° at the anatomic footprint just anterior to the PCL and at approximately the posterior edge of the lateral meniscus. The trocar was placed down on the skin, incision was made, and a size 10 flip cutter was drilled into the knee, was flipped, and we retrograde reamed to proximally a size 35 tunnel.   We cleaned all debris from the knee and placed a second fiber stick suture through our drill hole and pulled it out through the medial portal and drapes. We then prepared for graft passage. The graft was retrieved from the back table where it was placed on tension with wet kerlix wrapping. We pulled our lateral passing sutures out through our medial portal.  We passed the ends of our femoral sided button through these and pulled out the lateral side of the femur. We pulled the graft into the knee until the bone past the lateral cortex and the button was flipped. We then toggled the sutures to bring the femoral end up into the femur, seating the suture line which was 20 mm. Once we were seated, we then turned our attention to the tibia. We went into our medial portal and pulled out our tibial side passing sutures. The tibial side of the graft was placed through the passing sutures and pulled out through the drill hole. We then utilized a probe while we pulled tension on the tibial end of the tight rope to deliver the tibial end of the graft into the tunnel. The graft was seated nicely. We ran the knee through several cycles of flexion and extension were very happy with our tension level. We then placed the leg in extension, ensured we removed all tension from the tibial tight rope, and we placed the tight rope button over the tibial sutures and cinch them down to bone. We tied the sutures over the button. We then examined our graft again and removed any remaining tension out of our femoral side. We're very happy with the tension and it was isometric throughout range of motion. We tied our femoral sutures over the button and cut our sutures. A gentle lachman was performed and found to be stable. We then inserted our camera and took final pictures. We then copiously irrigated the knee and tolerated is no debris remaining. Fluid was removed from the knee.       The portals were closed with 4-O nylon.   The quad harvest site was closed with 3-O monocryl and 4-O nylon. A mixture of ropivicaine and toradol was injected into the knee joint. Leftover local anesthetic from pre-injection was injected around the incisions. Steri strips were applied to all wounds, and a sterile dressing and anthony hose were applied.      The patient was awoken from anesthesia and transferred to the hospital bed. He was taken to the recovery room in stable condition. IMPLANTS: Arthrex BTB tight rope with button, and Arthrex ACL tightrope button    TOURNIQUET TIME: Approximately 75 minutes    ESTIMATED BLOOD LOSS: 36TU    COMPLICATIONS: none    POST OPERATIVE PLAN: The patient will be discharged home from same day surgery was stable. He will ice and take prescribed medications this evening. Weight bearing will be partial with crutches. Dressing is to remain on. He will be seen for post operative visit POD 1 or 2.          Joey Jeronimo MD  Orthopaedic Surgery   9/2/21  1:43 PM

## 2021-09-02 NOTE — TELEPHONE ENCOUNTER
Pt wishes to proceed with surgery -- rescheduled 8/26 with OR. Pt was advised that if he is a no show again to surgery, the physician will not reschedule.

## 2021-09-03 ENCOUNTER — TELEPHONE (OUTPATIENT)
Dept: ORTHOPEDIC SURGERY | Age: 31
End: 2021-09-03

## 2021-09-03 NOTE — TELEPHONE ENCOUNTER
Received a call from the patient -- he did not come to his post-op appt today. Pt was tearful and anxious, stating that he does not have any help at home. Unable to care for himself. States he was discharged from the hospital and went home on the [facility] bus. Explained to the pt in great detail that this is why his post-operative appointment today was important --to go over ways to get through the weekend, take medications, use crutches, etc. Expressed that he has to keep his appt on 9/7 and properly educated pt on pain control, RICE. At the end of the call the pt was less anxious and agreeable with the plan. Will re-evaluate the pt on Tuesday.

## 2021-09-03 NOTE — ANESTHESIA POSTPROCEDURE EVALUATION
Department of Anesthesiology  Postprocedure Note    Patient: Cherry Fleming  MRN: [id number]  YOB: 1990  Date of evaluation: 9/2/2021  Time:  11:18 PM     Procedure Summary     Date: 09/02/21 Room / Location: [facility] OR 04 / [facility]    Anesthesia Start: 1120 Anesthesia Stop: 7734    Procedure: LEFT KNEE ARTHROSCOPIC ACL RECONSTRUCTION WITH AUTOGRAFT PARTIAL MEDIAL MENISCECTOMY (Left Knee) Diagnosis: (LEFT ACL TEAR)    Surgeons: Dylon Guerrero MD Responsible Provider: Tommy Thomson MD    Anesthesia Type: general ASA Status: 3          Anesthesia Type: general    Annetta Phase I: Annetta Score: 10    Annetta Phase II: Annetta Score: 10    Last vitals: Reviewed and per EMR flowsheets.        Anesthesia Post Evaluation    Patient location during evaluation: PACU  Patient participation: complete - patient participated  Level of consciousness: awake  Airway patency: patent  Nausea & Vomiting: no vomiting and no nausea  Complications: no  Cardiovascular status: hemodynamically stable  Respiratory status: acceptable  Hydration status: stable

## 2021-09-04 ENCOUNTER — HOSPITAL ENCOUNTER (EMERGENCY)
Age: 31
Discharge: HOME | End: 2021-09-04
Attending: EMERGENCY MEDICINE
Payer: COMMERCIAL

## 2021-09-04 VITALS
HEART RATE: 77 BPM | RESPIRATION RATE: 16 BRPM | SYSTOLIC BLOOD PRESSURE: 109 MMHG | BODY MASS INDEX: 26.6 KG/M2 | OXYGEN SATURATION: 99 % | HEIGHT: 71 IN | WEIGHT: 190 LBS | TEMPERATURE: 98.9 F | DIASTOLIC BLOOD PRESSURE: 67 MMHG

## 2021-09-04 DIAGNOSIS — Z98.890 S/P ACL REPAIR: ICD-10-CM

## 2021-09-04 DIAGNOSIS — M25.562 ACUTE PAIN OF LEFT KNEE: Primary | ICD-10-CM

## 2021-09-04 PROCEDURE — 99284 EMERGENCY DEPT VISIT MOD MDM: CPT

## 2021-09-04 PROCEDURE — 96372 THER/PROPH/DIAG INJ SC/IM: CPT

## 2021-09-04 PROCEDURE — 6360000002 HC RX W HCPCS: Performed by: EMERGENCY MEDICINE

## 2021-09-04 RX ORDER — MORPHINE SULFATE 2 MG/ML
2 INJECTION, SOLUTION INTRAMUSCULAR; INTRAVENOUS ONCE
Status: DISCONTINUED | OUTPATIENT
Start: 2021-09-04 | End: 2021-09-04

## 2021-09-04 RX ORDER — BACLOFEN 10 MG/1
10 TABLET ORAL 3 TIMES DAILY
Qty: 10 TABLET | Refills: 0 | Status: SHIPPED | OUTPATIENT
Start: 2021-09-04 | End: 2021-09-08

## 2021-09-04 RX ORDER — KETOROLAC TROMETHAMINE 10 MG/1
10 TABLET, FILM COATED ORAL EVERY 8 HOURS PRN
Qty: 10 TABLET | Refills: 0 | Status: SHIPPED | OUTPATIENT
Start: 2021-09-04 | End: 2021-09-28

## 2021-09-04 RX ORDER — BACLOFEN 10 MG/1
10 TABLET ORAL ONCE
Status: DISCONTINUED | OUTPATIENT
Start: 2021-09-04 | End: 2021-09-04

## 2021-09-04 RX ORDER — KETOROLAC TROMETHAMINE 30 MG/ML
30 INJECTION, SOLUTION INTRAMUSCULAR; INTRAVENOUS ONCE
Status: COMPLETED | OUTPATIENT
Start: 2021-09-04 | End: 2021-09-04

## 2021-09-04 RX ADMIN — KETOROLAC TROMETHAMINE 30 MG: 30 INJECTION, SOLUTION INTRAMUSCULAR at 06:36

## 2021-09-04 ASSESSMENT — ENCOUNTER SYMPTOMS
BACK PAIN: 0
ABDOMINAL PAIN: 0
COUGH: 0
EYE PAIN: 0
SINUS PRESSURE: 0
WHEEZING: 0
EYE REDNESS: 0
SHORTNESS OF BREATH: 0
EYE DISCHARGE: 0
SORE THROAT: 0
VOMITING: 0
DIARRHEA: 0
NAUSEA: 0

## 2021-09-04 ASSESSMENT — PAIN DESCRIPTION - ORIENTATION: ORIENTATION: LEFT

## 2021-09-04 ASSESSMENT — PAIN SCALES - GENERAL
PAINLEVEL_OUTOF10: 10
PAINLEVEL_OUTOF10: 10

## 2021-09-04 ASSESSMENT — PAIN DESCRIPTION - PAIN TYPE: TYPE: ACUTE PAIN

## 2021-09-04 ASSESSMENT — PAIN DESCRIPTION - LOCATION: LOCATION: LEG

## 2021-09-04 ASSESSMENT — PAIN DESCRIPTION - DESCRIPTORS: DESCRIPTORS: STABBING

## 2021-09-04 NOTE — ED PROVIDER NOTES
11-year-old male history of ACL repair yesterday with Dr. Jesus Ozuna presents to the emergency department with uncontrollable left knee pain. The patient is a history of HIV and substance abuse. He was given hydrocodone outpatient for pain but he states it makes him feel funny so he has not been taking it. He states no other complaints at this time. The history is provided by the patient. Knee Problem  Location:  Knee  Time since incident:  1 day  Knee location:  L knee  Pain details:     Quality:  Aching    Radiates to:  Does not radiate    Severity:  Moderate    Timing:  Intermittent    Progression:  Waxing and waning  Relieved by:  Nothing  Worsened by:  Nothing  Ineffective treatments: Hydrocodone. Associated symptoms: decreased ROM    Associated symptoms: no back pain and no fever         Review of Systems   Constitutional: Negative for chills and fever. HENT: Negative for ear pain, sinus pressure and sore throat. Eyes: Negative for pain, discharge and redness. Respiratory: Negative for cough, shortness of breath and wheezing. Cardiovascular: Negative for chest pain. Gastrointestinal: Negative for abdominal pain, diarrhea, nausea and vomiting. Genitourinary: Negative for dysuria and frequency. Musculoskeletal: Negative for arthralgias and back pain. Skin: Negative for rash and wound. Neurological: Negative for weakness and headaches. Hematological: Negative for adenopathy. All other systems reviewed and are negative. Physical Exam  Constitutional:       Appearance: Normal appearance. Comments: Patient falling asleep during exam.   HENT:      Head: Normocephalic and atraumatic. Mouth/Throat:      Mouth: Mucous membranes are moist.   Eyes:      Extraocular Movements: Extraocular movements intact. Pupils: Pupils are equal, round, and reactive to light. Cardiovascular:      Rate and Rhythm: Normal rate and regular rhythm. Pulses: Normal pulses.       Heart ambulatory aid. Past Surgical History:  has a past surgical history that includes hernia repair (Left, 2007) and knee surgery (Left, 9/2/2021). Social History:  reports that he has never smoked. He has never used smokeless tobacco. He reports previous drug use. Drugs: Methamphetamines and Marijuana. He reports that he does not drink alcohol. Family History: family history is not on file. The patients home medications have been reviewed. Allergies: Other    -------------------------------------------------- RESULTS -------------------------------------------------  Labs:  No results found for this visit on 09/04/21. Radiology:  No orders to display       ------------------------- NURSING NOTES AND VITALS REVIEWED ---------------------------  Date / Time Roomed:  9/4/2021  5:41 AM  ED Bed Assignment:  [id number]    The nursing notes within the ED encounter and vital signs as below have been reviewed. /67   Pulse 77   Temp 98.9 °F (37.2 °C) (Oral)   Resp 16   Ht 5' 11\" (1.803 m)   Wt 190 lb (86.2 kg)   SpO2 99%   BMI 26.50 kg/m²   Oxygen Saturation Interpretation: Normal      ------------------------------------------ PROGRESS NOTES ------------------------------------------  I have spoken with the patient and discussed todays results, in addition to providing specific details for the plan of care and counseling regarding the diagnosis and prognosis. Their questions are answered at this time and they are agreeable with the plan. I discussed at length with them reasons for immediate return here for re evaluation. They will followup with their primary care physician by calling their office tomorrow. --------------------------------- ADDITIONAL PROVIDER NOTES ---------------------------------  At this time the patient is without objective evidence of an acute process requiring hospitalization or inpatient management.   They have remained hemodynamically stable throughout their entire ED visit and are stable for discharge with outpatient follow-up. The plan has been discussed in detail and they are aware of the specific conditions for emergent return, as well as the importance of follow-up. Discharge Medication List as of 9/4/2021  7:10 AM      START taking these medications    Details   ketorolac (TORADOL) 10 MG tablet Take 1 tablet by mouth every 8 hours as needed for Pain, Disp-10 tablet, R-0Normal      baclofen (LIORESAL) 10 MG tablet Take 1 tablet by mouth 3 times daily for 10 doses, Disp-10 tablet, R-0Normal             Diagnosis:  1. Acute pain of left knee    2. S/P ACL repair        Disposition:  Patient's disposition: Discharge to home  Patient's condition is stable.           Shu Granados DO  09/05/21 0755

## 2021-09-07 ENCOUNTER — OFFICE VISIT (OUTPATIENT)
Dept: ORTHOPEDIC SURGERY | Age: 31
End: 2021-09-07

## 2021-09-07 VITALS — BODY MASS INDEX: 26.6 KG/M2 | WEIGHT: 190 LBS | HEIGHT: 71 IN

## 2021-09-07 DIAGNOSIS — Z98.890 STATUS POST RECONSTRUCTION OF ANTERIOR CRUCIATE LIGAMENT: Primary | ICD-10-CM

## 2021-09-07 PROCEDURE — 99024 POSTOP FOLLOW-UP VISIT: CPT | Performed by: NURSE PRACTITIONER

## 2021-09-07 NOTE — PROGRESS NOTES
Follow Up Post Operative Visit     Surgery: Left knee arthroscopy, ACL reconstruction with quadriceps tendon autograft, partial medial and partial lateral meniscectomies  Date: 9/2/2021    Subjective:    Dot Forrester is here for follow up visit s/p above procedure. He is doing well. Patient presented to the emergency department over the weekend for postoperative pain management. He reports that he did run out of pain medication. He states that yesterday a friend of his bumped into his crutches and he almost fell on his operative leg. Controlled Substances Monitoring:        Physical Exam:    Height: 5' 11\" (1.803 m), Weight: 190 lb (86.2 kg)    General: Alert and oriented x3, no acute distress  Cardiovascular/pulmonary: No labored breathing, peripheral perfusion intact  Musculoskeletal:    Left knee exam mild swelling visualized. Neurovascular sensation grossly intact. Incision sites are closed, edges well approximated, sutures are in place, no drainage present. No signs or symptoms of infection present. Imaging: No new imaging obtained today. Previous postoperative imaging was reviewed    Assessment and Plan: Status post left knee arthroscopy, ACL reconstruction with autograft, partial medial and partial lateral meniscectomies    Today we discussed his left knee. Patient reports that he did run out of pain medication too early after misunderstanding how he was directed to take his prescription medication. Discussed with patient not we are unable to prescribe refill medication early. He will take ibuprofen for symptom relief. He will call for refill if needed on Thursday. He will begin basic knee exercises per ACL booklet. Discussed restrictions with patient today. Patient verbalized understanding. He will follow-up either Friday or next Monday for suture removal at that time he will begin outpatient physical therapy.       Felicia Perdomo [place]  Orthopedic Surgery   09/07/21  3:03 PM

## 2021-09-09 DIAGNOSIS — Z98.890 STATUS POST RECONSTRUCTION OF ANTERIOR CRUCIATE LIGAMENT: Primary | ICD-10-CM

## 2021-09-09 RX ORDER — HYDROCODONE BITARTRATE AND ACETAMINOPHEN 5; 325 MG/1; MG/1
1 TABLET ORAL EVERY 6 HOURS PRN
Qty: 28 TABLET | Refills: 0 | Status: SHIPPED | OUTPATIENT
Start: 2021-09-09 | End: 2021-09-16

## 2021-09-09 NOTE — TELEPHONE ENCOUNTER
Pt is requesting a refill of his Norco     Surgery: [facility] arthroscopy, ACL reconstruction with quadriceps tendon autograft, partial medial and partial lateral meniscectomies  Date: 9/2/2021    1st p/o refill   Making a note that the pt finished his rx 9/7 -- pt was taking his rx Q3H. Educated pt on appropriate instructions, verbalized understanding.      Medication pended and routed  [facility]

## 2021-09-10 ENCOUNTER — OFFICE VISIT (OUTPATIENT)
Dept: ORTHOPEDIC SURGERY | Age: 31
End: 2021-09-10

## 2021-09-10 VITALS — WEIGHT: 190 LBS | BODY MASS INDEX: 26.6 KG/M2 | HEIGHT: 71 IN

## 2021-09-10 DIAGNOSIS — Z98.890 STATUS POST RECONSTRUCTION OF ANTERIOR CRUCIATE LIGAMENT: Primary | ICD-10-CM

## 2021-09-10 PROCEDURE — 99024 POSTOP FOLLOW-UP VISIT: CPT | Performed by: ORTHOPAEDIC SURGERY

## 2021-09-10 NOTE — PROGRESS NOTES
Follow Up Post Operative Visit     Surgery: [facility] arthroscopy, ACL reconstruction with quadriceps tendon autograft, partial medial and partial lateral meniscectomies  Date: 9/2/2021    Subjective:    Josefina Santiago is here for follow up visit s/p above procedure. Overall he is doing okay. Still having a fair amount of pain      Controlled Substances Monitoring:        Physical Exam:    Height: 5' 11\" (1.803 m), Weight: 190 lb (86.2 kg) (per pt)    General: Alert and oriented x3, no acute distress  Cardiovascular/pulmonary: No labored breathing, peripheral perfusion intact  Musculoskeletal:    Exam of the knee shows moderate effusion. He has quadriceps tone but cannot straight leg raise yet. Moderate arcs of motion are tolerable. Imaging: No new images. Previous images reviewed    Assessment and Plan: Status post left knee ACL reconstruction, partial medial lateral meniscectomies     overall he is doing okay. We discussed rehab protocol moving forward. We will start him on PT next-door.   Follow-up in 5 weeks      Marissa Bell MD  Orthopaedic Surgery   9/10/21  10:41 AM

## 2021-09-16 ENCOUNTER — EVALUATION (OUTPATIENT)
Dept: PHYSICAL THERAPY | Age: 31
End: 2021-09-16
Payer: COMMERCIAL

## 2021-09-16 DIAGNOSIS — R26.9 GAIT DISTURBANCE: ICD-10-CM

## 2021-09-16 DIAGNOSIS — Z98.890 S/P ACL REPAIR: Primary | ICD-10-CM

## 2021-09-16 DIAGNOSIS — R26.2 DIFFICULTY WALKING: ICD-10-CM

## 2021-09-16 PROCEDURE — 97112 NEUROMUSCULAR REEDUCATION: CPT | Performed by: PHYSICAL THERAPIST

## 2021-09-16 PROCEDURE — 97110 THERAPEUTIC EXERCISES: CPT | Performed by: PHYSICAL THERAPIST

## 2021-09-16 PROCEDURE — 97161 PT EVAL LOW COMPLEX 20 MIN: CPT | Performed by: PHYSICAL THERAPIST

## 2021-09-16 PROCEDURE — 97016 VASOPNEUMATIC DEVICE THERAPY: CPT | Performed by: PHYSICAL THERAPIST

## 2021-09-16 NOTE — PROGRESS NOTES
[address] [facility]   Phone: [phone]   Fax: [phone]         Date:  2021   Patient: hCerry Fleming  : 1990  MRN: [id number]  Referring Provider: Dylon Guerrero MD  [address]  [facility]     Medical Diagnosis:     DATE OF PROCEDURE:  21     SURGEON: Dylon Guerrero MD     PREOPERATIVE DIAGNOSIS: Anterior cruciate ligament (ACL) tear, medial meniscus tear left knee      POSTOPERATIVE DIAGNOSIS: Anterior cruciate ligament (ACL) tear, medial and lateral meniscus tears, global grade 3 and focal grade 4 changes of medial tibial plateau posterior aspect     OPERATION:  Left knee arthroscopy, ACL reconstruction with quadriceps tendon autograft, partial medial and partial lateral meniscectomies    SUBJECTIVE:     Onset date: 2021    Onset: Sudden onset    Mechanism of Injury: The pt underwent Left knee arthroscopy, ACL reconstruction with quadriceps tendon autograft, partial medial and partial lateral meniscectomies on date 2021. The pt AMB into outpt clinic with ASHOK HERNDON. The pt reports that he walked around [facility] yesterday for prolong periods. The pt c/o hurting pain & edema at left knee, & presents with c/o weakness & instability at left knee.       Previous PT: outpt PT in      Medical Management for Current Problem: LLE ACL repair    Chief complaint: pain, edema, decreased motion, decreased mobility, weakness, inability / limited ability to use leg, difficulty walking    Behavior: condition is staying the same    Pain: constant  Current: 6/10     Best: 5/10     Worst:10/10    Symptom Type/Quality: sharp, dull, aching, throbbing, shooting  Location[de-identified] Knee: gross & diffuse edema left knee     Aggravated by: walking, standing, stairs, getting in/out of car, pivoting    Relieved by: rest, ice    Imaging results: XR KNEE LEFT (1-2 VIEWS)    Result Date: 9/3/2021  EXAMINATION: TWO XRAY VIEWS OF THE LEFT KNEE 2021 2:49 pm COMPARISON: as needed for Erectile Dysfunction 20 tablet 5    omeprazole (PRILOSEC) 40 MG delayed release capsule Take 1 capsule by mouth every morning (before breakfast) (Patient taking differently: Take 40 mg by mouth every morning (before breakfast) Has not taken for 2 months) 30 capsule 5    ketoconazole (NIZORAL) 2 % shampoo 4 times per week. Apply to the affected region allowed to dry for 5 minutes then rinse. Afterward place Bactroban ointment over the affected regions as needed 120 mL 1    BIKTARVY -25 MG TABS per tablet Take 1 tablet by mouth nightly       ammonium lactate (LAC-HYDRIN) 12 % lotion Apply topically twice daily 400 g 2     No current facility-administered medications for this visit. Occupation: pt is not employed at this time. .    Exercise regimen: none    Patient Goals: pain relief, return to prior activity, full use of leg, get back to normal, walk normally, get rid of assistive device    Precautions/Contraindications: recent surgery, LLE ACL repair    OBJECTIVE:     Observations: well nourished male    Inspection: LLE ACL incisions healing well    Edema: moderate left knee    Gait: antalgic gait, limp R LE, short step length, ambulates with quad cane    Joint/Motion:    Knee:    Left:   AROM: 95° Flexion,  -3° Extension      Muscle Length Testing: Moderate restrictions with LLE quadriceps & hamstrings     Strength:    Knee:   Left: Hip: NA, Knee: NA     Palpation: pt presents with gross & diffuse tenderness at left knee. Special test comments: NA due to LLE ACL repair 9/2/2021      ASSESSMENT     Outcome Measure: Lower Extremity Functional Scale (LEFS) 100% impairment    Problems:    Pain reported 10/10   ROM decreased    Strength decreased    Decreased functional ability with walking, standing, use of left lower extremity    Reason for Skilled Care:  The pt presents with functional limitations, gait deviations, weakness, limited rom, restricted flexibility, edema, tenderness, instability, & decreased quality of life due to LLE ACL repair 9/2/2021. Therefore, I recommend that the pt requires the skilled services of outpt PT Rehab to achieve LTGs, restore & resolve his deficits & limitations. [x] There are no barriers affecting plan of care or recovery    [] Barriers to this patient's plan of care or recovery include. Domestic Concerns:  [x] No  [] Yes:    Short Term goals (3 weeks)   Decrease reported pain to 3/10   Increase ROM to 125 flex WFL ext   Increase Strength to 4/5 to 4+/5    Able to perform/complete the following functions/tasks: house AMB without difficulty & completes light household activities   Lower Extremity Functional Scale (LEFS) 40% impairment    Long Term goals (6 weeks)   Decrease reported pain to 0/10   Increase ROM to WNL   Increase Strength to 5/5    Able to perform/complete the following functions/tasks: community AMB no deviations   LEFS 10% impairment    Independent with Home Exercise Programs    Rehab Potential: [x] Good  [] Fair  [] Poor    PLAN       Treatment Plan: 2x/wk x 6wks  [x] Therapeutic Exercise  [x] Therapeutic Activity  [x] Neuromuscular Re-education   [x] Gait Training  [x] Balance Training  [x] Aerobic conditioning  [x] Manual Therapy  [] Massage/Fascial release   [] Work/Sport specific activities    [] Pain Neuroscience [x] Cold/hotpack  [x] Vasocompression  [x] Electrical Stimulation  [] Lumbar/Cervical Traction  [] Ultrasound   [] Iontophoresis: 4 mg/mL Dexamethasone Sodium Phosphate 40-80 mAmin  [] Dry Needling      [x] Instruction in HEP      []  Medication allergies reviewed for use of Dexamethasone Sodium Phosphate 4mg/ml  with iontophoresis treatments. Patient is not allergic.       The following CPT codes are likely to be used in the care of this patient: 455 1011 PT Evaluation: [address] [facility] Final Therapeutic Exercise   28124 Neuromuscular Re-Education   [address] [facility] Therapeutic Activities   95723 Manual Therapy   26350 Gait Training   77593 Vasopneumatic Device    Electrical Stimulation      Suggested Professional Referral: [x] No  [] Yes:     Patient Education:  [x] Plans/Goals, Risks/Benefits discussed  [x] Home exercise program  Method of Education: [x] Verbal  [x] Demo  [x] Written  Comprehension of Education:  [x] Verbalizes understanding. [x] Demonstrates understanding. [] Needs Review. [] Demonstrates/verbalizes understanding of HEP/Ed previously given. Frequency: 2 days per week for 6 weeks    Patient understands diagnosis/prognosis and consents to treatment, plan and goals: [x] Yes    [] No     Thank you for the opportunity to work with your patient. If you have questions or comments, please contact me at numbers listed above. Electronically signed by: Ron Redding, [address]         Please sign Physician's Certification and return to: [address]  [facility]  Dept: [phone]  Dept Fax: [phone] Certification / Comments     Frequency/Duration 2 days per week for 6 weeks. Certification period from 9/16/2021  to 11/16/2021. I have reviewed the Plan of Care established for skilled therapy services and certify that the services are required and that they will be provided while the patient is under my care.     Physician's Comments/Revisions:               Physician's Printed Name:                                           [de-identified] Signature:                                                               Date:

## 2021-09-16 NOTE — PROGRESS NOTES
[address] [facility]   Phone: [phone]   Fax: [phone]      Physical Therapy Daily Treatment Note    Date: 2021  Patient Name: Gian Liu  : 1990   MRN: [id number]  [facility]: 2021   DOSx: 2021  Referring Provider: Alexa Núñez MD  [address] [place] [address]     Medical Diagnosis:     DATE OF PROCEDURE:  21     SURGEON: Alexa Núñez MD       PREOPERATIVE DIAGNOSIS: Anterior cruciate ligament (ACL) tear, medial meniscus tear left knee      POSTOPERATIVE DIAGNOSIS: Anterior cruciate ligament (ACL) tear, medial and lateral meniscus tears, global grade 3 and focal grade 4 changes of medial tibial plateau posterior aspect     OPERATION:  Left knee arthroscopy, ACL reconstruction with quadriceps tendon autograft, partial medial and partial lateral meniscectomies    Outcome Measure: LEFS 100% Disability    S: The pt c/o edema, weakness, instability, & \"hurting\" pain at left knee. O: Please refer to PT Eval 2021  Time 9957-7642     Visit  Repeat outcome measure at mid point and end. Pain      Strength      Palpation      ROM      Modalities      GameReady Left knee 15min Vaso   Manual      Russian Estim to quadriceps Performed with quad sets 7min NR   Standing TKE x30  NR   Gait training with QC Sequencing with RUE QC 2021 NR   Hamstring stretch-seated  x30 Yoga stretch TE   Heel slides-foot on wall Unable to perform     Seated heel slides x30  TE   Bike      Heel slides-long sit x30 strap TE   QS x30  TE   SLR-supine x3  TE   SAQ      LAQ      Hamstring Curl       TG squats      TG calf raises      Step-ups - FWD      Step-ups - LAT      Step-ups - BWD        NMR To improve balance for safe community and home ambulation    Resisted walk      FWD      BKWD      lat      March      Side stepping      Retro walk      Heel to toe      A:  Tolerated well. Above added to written HEP.     P: Continue with rehab plan    Lillian Verduzco Romana Chapman, [address] [facility] [facility] [address]    Treatment Charges: Mins Units   Initial Evaluation 20 1   Re-Evaluation     Ther Exercise         TE 13 1   Manual Therapy     MT     Ther Activities        TA     Gait Training          GT     Neuro Re-education NR 12 1   Modalities 15 1   Non-Billable Service Time     Other     Total Time/Units 60 4

## 2021-09-23 ENCOUNTER — TELEPHONE (OUTPATIENT)
Dept: ORTHOPEDIC SURGERY | Age: 31
End: 2021-09-23

## 2021-09-23 NOTE — TELEPHONE ENCOUNTER
Pt called requesting a narcotic script -- spoke with patient and advised him that until he is able to show up to physical therapy appt that we are unable to fill his script. Pt states that he will show up to his appts from here on out.

## 2021-09-28 ENCOUNTER — OFFICE VISIT (OUTPATIENT)
Dept: PRIMARY CARE CLINIC | Age: 31
End: 2021-09-28
Payer: COMMERCIAL

## 2021-09-28 VITALS
HEIGHT: 70 IN | OXYGEN SATURATION: 97 % | BODY MASS INDEX: 28.06 KG/M2 | DIASTOLIC BLOOD PRESSURE: 70 MMHG | RESPIRATION RATE: 21 BRPM | SYSTOLIC BLOOD PRESSURE: 120 MMHG | WEIGHT: 196 LBS | HEART RATE: 86 BPM

## 2021-09-28 DIAGNOSIS — N52.2 DRUG-INDUCED ERECTILE DYSFUNCTION: ICD-10-CM

## 2021-09-28 DIAGNOSIS — Z98.890 S/P ACL REPAIR: Primary | ICD-10-CM

## 2021-09-28 DIAGNOSIS — L73.9 FOLLICULITIS: ICD-10-CM

## 2021-09-28 PROCEDURE — G8427 DOCREV CUR MEDS BY ELIG CLIN: HCPCS | Performed by: STUDENT IN AN ORGANIZED HEALTH CARE EDUCATION/TRAINING PROGRAM

## 2021-09-28 PROCEDURE — 1036F TOBACCO NON-USER: CPT | Performed by: STUDENT IN AN ORGANIZED HEALTH CARE EDUCATION/TRAINING PROGRAM

## 2021-09-28 PROCEDURE — 99214 OFFICE O/P EST MOD 30 MIN: CPT | Performed by: STUDENT IN AN ORGANIZED HEALTH CARE EDUCATION/TRAINING PROGRAM

## 2021-09-28 PROCEDURE — G8417 CALC BMI ABV UP PARAM F/U: HCPCS | Performed by: STUDENT IN AN ORGANIZED HEALTH CARE EDUCATION/TRAINING PROGRAM

## 2021-09-28 RX ORDER — SILDENAFIL 100 MG/1
100 TABLET, FILM COATED ORAL DAILY PRN
Qty: 20 TABLET | Refills: 5 | Status: SHIPPED
Start: 2021-09-28 | End: 2022-02-01 | Stop reason: SDUPTHER

## 2021-09-28 RX ORDER — CLINDAMYCIN PHOSPHATE 10 MG/G
GEL TOPICAL
Qty: 60 G | Refills: 0 | Status: SHIPPED | OUTPATIENT
Start: 2021-09-28 | End: 2021-10-05

## 2021-09-28 RX ORDER — NAPROXEN 250 MG/1
250 TABLET ORAL 2 TIMES DAILY WITH MEALS
Qty: 180 TABLET | Refills: 1 | Status: SHIPPED
Start: 2021-09-28 | End: 2022-08-10

## 2021-09-28 SDOH — ECONOMIC STABILITY: FOOD INSECURITY: WITHIN THE PAST 12 MONTHS, YOU WORRIED THAT YOUR FOOD WOULD RUN OUT BEFORE YOU GOT MONEY TO BUY MORE.: NEVER TRUE

## 2021-09-28 SDOH — ECONOMIC STABILITY: FOOD INSECURITY: WITHIN THE PAST 12 MONTHS, THE FOOD YOU BOUGHT JUST DIDN'T LAST AND YOU DIDN'T HAVE MONEY TO GET MORE.: NEVER TRUE

## 2021-09-28 ASSESSMENT — SOCIAL DETERMINANTS OF HEALTH (SDOH): HOW HARD IS IT FOR YOU TO PAY FOR THE VERY BASICS LIKE FOOD, HOUSING, MEDICAL CARE, AND HEATING?: NOT HARD AT ALL

## 2021-09-28 NOTE — PROGRESS NOTES
[facility]  Department of Family Medicine      Patient:  Josefina Santiago 27 y.o. male     Date of Service: 9/28/21      Chief complaint:   Chief Complaint   Patient presents with    Establish Care         History ofPresent Illness   The patient is a 27 y.o. male  presented to the clinic with complaints as above. ED f/u  -had ACL repair, went to ED for severe pain, was put on toradol and baclofen  -used to be on sildenafil and adderall  -was diagnosed with ADHD as a child   -is seeing psychiatrist on this Friday  -hasn't had adderall for 3 months and feels like he is all over the place   -is having back pain, which is a chronic issue for the patient  -had acl surgery 10 days ago, has f/u with ortho on October 15, 2021   -has left finger/pointer finger pain  -has issues with anxiety, not taking anything for this, was on risperdal for this, states he did not help  -get Biktarvy [facility] [place] an infectious disease place, states last viral load was undetectable   -originally from [facility], just moved here last year  -can't work due to mental and physical issues, is trying to get on social security   -states he does not want labs done today because it would make him anxious  -does walk with cane, however can is partially broken on the bottom and wobbly and at times he feels like he may fall walking with it     Past Medical History:      Diagnosis Date    Acid reflux disease     ACL (anterior cruciate ligament) tear     bilateral    Bilateral knee pain     Chronic back pain     Depression     HIV (human immunodeficiency virus infection) ([address]     Severe episode of recurrent major depressive disorder, with psychotic features ([address] 12/25/2019    Substance abuse ([address]     marijuana approx.  twice/week    Use of cane as ambulatory aid        PastSurgical History:        Procedure Laterality Date    HERNIA REPAIR Left 2007    inguinal    KNEE SURGERY Left 9/2/2021    LEFT KNEE ARTHROSCOPIC ACL RECONSTRUCTION WITH AUTOGRAFT PARTIAL MEDIAL MENISCECTOMY performed by Santino Canas MD at [facility] OR       Allergies: Other    Social History:   Social History     Socioeconomic History    Marital status: Single     Spouse name: Not on file    Number of children: Not on file    Years of education: Not on file    Highest education level: Not on file   Occupational History    Not on file   Tobacco Use    Smoking status: Never Smoker    Smokeless tobacco: Never Used   Vaping Use    Vaping Use: Former   Substance and Sexual Activity    Alcohol use: Never    Drug use: Not Currently     Types: Methamphetamines, Marijuana     Comment: Marijuana twice per month    Sexual activity: Yes     Partners: Male     Comment: Uses condoms   Other Topics Concern    Not on file   Social History Narrative    Not on file     Social Determinants of Health     Financial Resource Strain: Low Risk     Difficulty of Paying Living Expenses: Not hard at all   Food Insecurity: No Food Insecurity    Worried About [address] in the Last Year: Never true    [address] [employer] in the Last Year: Never true   Transportation Needs:     Lack of Transportation (Medical):  Lack of Transportation (Non-Medical):    Physical Activity:     Days of Exercise per Week:     Minutes of Exercise per Session:    Stress:     Feeling of Stress :    Social Connections:     Frequency of Communication with Friends and Family:     Frequency of Social Gatherings with Friends and Family:     Attends Buddhism Services:     Active Member of Clubs or Organizations:     Attends Club or Organization Meetings:     Marital Status:    Intimate Partner Violence:     Fear of Current or Ex-Partner:     Emotionally Abused:     Physically Abused:     Sexually Abused:         Family History:   No family history on file.     Review of Systems:   Review of Systems - as above     Physical Exam   Vitals: BP 120/70   Pulse 86   Resp 21   Ht 5' 10.25\" (1.784 m)   Wt 196 lb (88.9 kg)   SpO2 97%   BMI 27.92 kg/m²   Physical Exam  Constitutional:       Appearance: He is well-developed. HENT:      Head: Normocephalic and atraumatic. Eyes:      General:         Right eye: No discharge. Left eye: No discharge. Conjunctiva/sclera: Conjunctivae normal.   Neck:      Trachea: No tracheal deviation. Cardiovascular:      Rate and Rhythm: Normal rate and regular rhythm. Heart sounds: Normal heart sounds. Pulmonary:      Effort: Pulmonary effort is normal. No respiratory distress. Breath sounds: Normal breath sounds. No wheezing. Abdominal:      General: Bowel sounds are normal. There is no distension. Palpations: Abdomen is soft. Tenderness: There is no abdominal tenderness. Musculoskeletal:         General: Swelling (left knee) and tenderness (left knee) present. Cervical back: Normal range of motion and neck supple. Skin:     General: Skin is warm and dry. Findings: Lesion (raised, no red, surroudning face and beard area, minimally TTP) present. Neurological:      Mental Status: He is alert. Psychiatric:         Speech: Speech is tangential.           Assessment and Plan       1. S/P ACL repair  ED f/u  -Still having pain and swelling, ran out of NSAIDs, will give naproxen as below  -Advised and encouraged patient to f/u with ortho   - naproxen (NAPROSYN) 250 MG tablet; Take 1 tablet by mouth 2 times daily (with meals)  Dispense: 180 tablet; Refill: 1  - DME Order for (Specify) as OP    2. Folliculitis  New issue  -Unclear etiology, however given area could be fungal vs bacterial, will treat for bacterial first, if no improvement will given anti fungal  - clindamycin (CLEOCIN-T) 1 % gel; Apply topically 2 times daily. Dispense: 60 g; Refill: 0    3. Drug-induced erectile dysfunction  Med refill   - sildenafil (VIAGRA) 100 MG tablet;  Take 1 tablet by mouth daily as capsule by mouth every morning (before breakfast) (Patient not taking: Reported on 9/28/2021) 30 capsule 5     No current facility-administered medications for this visit. Janae Canales,        This document may have been prepared at least partially through the use of voice recognition software. Although effort is taken to assure the accuracy ofthis document, it is possible that grammatical, syntax,  or spelling errors may occur.

## 2021-10-04 ENCOUNTER — TREATMENT (OUTPATIENT)
Dept: PHYSICAL THERAPY | Age: 31
End: 2021-10-04

## 2021-10-04 NOTE — PROGRESS NOTES
[address] [facility]   Phone: [phone]   Fax: [phone]    Discharge Summary      REASON FOR DISCHARGE: Pt no showed 5 consecutive tx appointments and only attended his IE. He was called each time s calling to reschedule or with reason for missing. Surgeon was consulted d/t noncompliance. He is being d/c at this time d/t lapse of care and noncompliance. PATIENT EDUCATION/INSTRUCTIONS: continue HEP as instructed    RECOMMENDATIONS: call c questions or if additional services are warranted. Thank you for the opportunity to work with your patient. If you have questions or comments, please contact me at numbers listed above.       Heriberto Gar [address] , DPT PT [id number] 10/4/2021

## 2021-10-18 ENCOUNTER — OFFICE VISIT (OUTPATIENT)
Dept: ORTHOPEDIC SURGERY | Age: 31
End: 2021-10-18

## 2021-10-18 VITALS — WEIGHT: 190 LBS | BODY MASS INDEX: 26.6 KG/M2 | HEIGHT: 71 IN

## 2021-10-18 DIAGNOSIS — Z98.890 STATUS POST RECONSTRUCTION OF ANTERIOR CRUCIATE LIGAMENT: Primary | ICD-10-CM

## 2021-10-18 PROCEDURE — 99024 POSTOP FOLLOW-UP VISIT: CPT | Performed by: ORTHOPAEDIC SURGERY

## 2021-10-18 NOTE — PROGRESS NOTES
Follow Up Post Operative Visit     [facility] arthroscopy, ACL reconstruction with quadriceps tendon autograft, partial medial and partial lateral meniscectomies  Date: 9/2/2021    Subjective:    Luz Whyte is here for follow up visit s/p above procedure. He is doing well. He has been doing exercises on his own at home as he has not been able to get transportation for PT. Controlled Substances Monitoring:        Physical Exam:    Height: 5' 11\" (1.803 m), Weight: 190 lb (86.2 kg) (per pt)    General: Alert and oriented x3, no acute distress  Cardiovascular/pulmonary: No labored breathing, peripheral perfusion intact  Musculoskeletal:    Exam of the knee shows full range of motion. There is trace effusion. Lachman is stable. Pivot shift is negative. Knee is stable to varus and valgus at 0 and 30 degrees      Imaging: No new images. Previous images reviewed    Assessment and Plan: Status post left knee ACL reconstruction and partial medial lateral meniscectomies now about 6 weeks out  Overall he looks good, despite not being able to do any PT due to transportation issues. He has been working on home exercises. We discussed sticking to protocol not doing too much too soon. We will check him back in 6 weeks to reassess.     Yonny Camarena MD  Orthopaedic Surgery   10/18/21  1:29 PM

## 2021-11-01 ENCOUNTER — TELEPHONE (OUTPATIENT)
Dept: PRIMARY CARE CLINIC | Age: 31
End: 2021-11-01

## 2021-11-01 DIAGNOSIS — L98.9 FACIAL LESION: Primary | ICD-10-CM

## 2021-11-01 NOTE — TELEPHONE ENCOUNTER
----- Message from [address] [facility] [address] sent at 10/29/2021  9:34 AM EDT -----  Subject: Referral Request    QUESTIONS   Reason for referral request? Suffering pain and depression from breakouts   on his face   Has the physician seen you for this condition before? No   Preferred Specialist (if applicable)? Do you already have an appointment scheduled? No  Additional Information for Provider? Phuc Franco feels that the   prescription medication is not working.  ---------------------------------------------------------------------------  --------------  Jyoti NAYLOR  What is the best way for the office to contact you? OK to leave message on   voicemail  Preferred Call Back Phone Number?  [phone]

## 2021-11-02 DIAGNOSIS — L98.9 FACIAL LESION: Primary | ICD-10-CM

## 2021-11-12 ENCOUNTER — NURSE TRIAGE (OUTPATIENT)
Dept: OTHER | Facility: CLINIC | Age: 31
End: 2021-11-12

## 2021-11-12 NOTE — TELEPHONE ENCOUNTER
Received call from [facility] [place] with [employer]. Brief description of triage: folliculitis on my face. Getting worse, all over the area where you would shave is irritated and inflamed. Putting warm cloths on it and then taking tweezers to pluck the ingrown hairs out. Also getting some on my chest and groin area    Triage indicates for patient to PCP in 3 days, referred to [facility] if needed      Care advice provided, patient verbalizes understanding; denies any other questions or concerns; instructed to call back for any new or worsening symptoms. Writer provided warm transfer to [employer] [place] for appointment scheduling. Attention Provider: Thank you for allowing me to participate in the care of your patient. The patient was connected to triage in response to information provided to the ECC/PSC. Please do not respond through this encounter as the response is not directed to a shared pool. Reason for Disposition   Mild facial swelling (puffiness) and persists > 3 days    Answer Assessment - Initial Assessment Questions  1. ONSET: \"When did the swelling start? \" (e.g., minutes, hours, days)      March of 2021    2. LOCATION: \"What part of the face is swollen? \"      Looks like folliculitis,     3. SEVERITY: \"How swollen is it? \"      Involving all the areas where facial hair grows, as well as in groin area    4. ITCHING: \"Is there any itching? \" If so, ask: \"How much? \"   (Scale 1-10; mild, moderate or severe)      Face itches a lot    5. PAIN: \"Is the swelling painful to touch? \" If so, ask: \"How painful is it? \"   (Scale 1-10; mild, moderate or severe)      Severe    6. FEVER: \"Do you have a fever? \" If so, ask: \"What is it, how was it measured, and when did it start? \"       No    7. CAUSE: \"What do you think is causing the face swelling? \"      Folliculitis    8. RECURRENT SYMPTOM: \"Have you had face swelling before? \" If so, ask: \"When was the last time? \" \"What happened that time? \"      Has always had problems with ingrown hairs but this is extreme    9. OTHER SYMPTOMS: \"Do you have any other symptoms? \" (e.g., toothache, leg swelling)      Denies    10. PREGNANCY: \"Is there any chance you are pregnant? \" \"When was your last menstrual period? \"        N/a    Protocols used: [facility]

## 2021-12-03 ENCOUNTER — OFFICE VISIT (OUTPATIENT)
Dept: ORTHOPEDIC SURGERY | Age: 31
End: 2021-12-03

## 2021-12-03 VITALS — HEIGHT: 71 IN | WEIGHT: 190 LBS | BODY MASS INDEX: 26.6 KG/M2

## 2021-12-03 DIAGNOSIS — Z98.890 STATUS POST RECONSTRUCTION OF ANTERIOR CRUCIATE LIGAMENT: Primary | ICD-10-CM

## 2021-12-03 PROCEDURE — 99024 POSTOP FOLLOW-UP VISIT: CPT | Performed by: NURSE PRACTITIONER

## 2021-12-03 RX ORDER — IBUPROFEN 800 MG/1
800 TABLET ORAL EVERY 6 HOURS PRN
Qty: 40 TABLET | Refills: 2 | Status: SHIPPED
Start: 2021-12-03 | End: 2022-06-28 | Stop reason: SDUPTHER

## 2022-02-01 DIAGNOSIS — N52.2 DRUG-INDUCED ERECTILE DYSFUNCTION: ICD-10-CM

## 2022-02-01 RX ORDER — SILDENAFIL 100 MG/1
100 TABLET, FILM COATED ORAL DAILY PRN
Qty: 20 TABLET | Refills: 5 | Status: SHIPPED
Start: 2022-02-01 | End: 2022-05-17 | Stop reason: SDUPTHER

## 2022-02-01 NOTE — TELEPHONE ENCOUNTER
----- Message from Myra Pelaez sent at 1/31/2022  4:24 PM EST -----  Subject: Refill Request    QUESTIONS  Name of Medication? sildenafil (VIAGRA) 100 MG tablet  Patient-reported dosage and instructions? 1 - 100 mg tablet every other   day   How many days do you have left? 0  Preferred Pharmacy? [address] [facility]  Pharmacy phone number (if available)? [phone]  Additional Information for Provider? Patient is requesting a refill of   this medication.   ---------------------------------------------------------------------------  --------------  CALL BACK INFO  What is the best way for the office to contact you? OK to leave message on   voicemail, OK to respond with electronic message via [facility] portal (only   for patients who have registered [facility] account)  Preferred Call Back Phone Number?  [phone]

## 2022-02-25 DIAGNOSIS — L73.1 PSEUDOFOLLICULITIS BARBAE: ICD-10-CM

## 2022-02-25 RX ORDER — KETOCONAZOLE 20 MG/ML
SHAMPOO TOPICAL
Qty: 120 ML | Refills: 1 | Status: SHIPPED
Start: 2022-02-25 | End: 2022-05-03 | Stop reason: SDUPTHER

## 2022-02-25 NOTE — TELEPHONE ENCOUNTER
----- Message from Maximino Gonzalez sent at 2/25/2022  9:27 AM EST -----  Subject: Refill Request    QUESTIONS  Name of Medication? Other - ketoconazole (NIZORAL) 2 % shampoo  Patient-reported dosage and instructions? Apply to the affected region   allowed to dry for 5 minutes then rinse. Afterward place Bactroban   ointment over the affected regions as needed  How many days do you have left? 0  Preferred Pharmacy? [address] phone number (if available)? [phone]  Additional Information for Provider? Patient would like to have this   medication refilled if possible, please call to discuss, thank you.   ---------------------------------------------------------------------------  --------------  CALL BACK INFO  What is the best way for the office to contact you? OK to leave message on   voicemail  Preferred Call Back Phone Number?  [phone]

## 2022-03-02 ENCOUNTER — NURSE TRIAGE (OUTPATIENT)
Dept: OTHER | Facility: CLINIC | Age: 32
End: 2022-03-02

## 2022-03-02 NOTE — TELEPHONE ENCOUNTER
the pain? \"  \"What does it keep you from doing? \" (e.g., Scale 1-10; or mild, moderate, severe)    -  NONE: (0): no pain    -  MILD (1-3): doesn't interfere with normal activities     -  MODERATE (4-7): interferes with normal activities (e.g., work or school) or awakens from sleep, limping     -  SEVERE (8-10): excruciating pain, unable to do any normal activities, unable to walk      10/10    8. TETANUS: For any breaks in the skin, ask: \"When was the last tetanus booster? \"      No    9. OTHER SYMPTOMS: \"Do you have any other symptoms? \"  (e.g., \"pop\" when knee injured, swelling, locking, buckling)       Buckling. 10. PREGNANCY: \"Is there any chance you are pregnant? \" \"When was your last menstrual period? \"        n/a    Protocols used: KNEE INJURY-ADULT-[place]

## 2022-03-04 ENCOUNTER — OFFICE VISIT (OUTPATIENT)
Dept: PRIMARY CARE CLINIC | Age: 32
End: 2022-03-04
Payer: COMMERCIAL

## 2022-03-04 VITALS
WEIGHT: 209 LBS | BODY MASS INDEX: 29.26 KG/M2 | HEIGHT: 71 IN | DIASTOLIC BLOOD PRESSURE: 60 MMHG | TEMPERATURE: 96.9 F | RESPIRATION RATE: 18 BRPM | OXYGEN SATURATION: 98 % | SYSTOLIC BLOOD PRESSURE: 98 MMHG | HEART RATE: 89 BPM

## 2022-03-04 DIAGNOSIS — N48.1 BALANITIS: ICD-10-CM

## 2022-03-04 DIAGNOSIS — B20 HIV INFECTION, UNSPECIFIED SYMPTOM STATUS (HCC): ICD-10-CM

## 2022-03-04 DIAGNOSIS — M25.561 ACUTE PAIN OF RIGHT KNEE: Primary | ICD-10-CM

## 2022-03-04 PROCEDURE — 99214 OFFICE O/P EST MOD 30 MIN: CPT | Performed by: STUDENT IN AN ORGANIZED HEALTH CARE EDUCATION/TRAINING PROGRAM

## 2022-03-04 PROCEDURE — G8427 DOCREV CUR MEDS BY ELIG CLIN: HCPCS | Performed by: STUDENT IN AN ORGANIZED HEALTH CARE EDUCATION/TRAINING PROGRAM

## 2022-03-04 PROCEDURE — G8484 FLU IMMUNIZE NO ADMIN: HCPCS | Performed by: STUDENT IN AN ORGANIZED HEALTH CARE EDUCATION/TRAINING PROGRAM

## 2022-03-04 PROCEDURE — G8417 CALC BMI ABV UP PARAM F/U: HCPCS | Performed by: STUDENT IN AN ORGANIZED HEALTH CARE EDUCATION/TRAINING PROGRAM

## 2022-03-04 PROCEDURE — 1036F TOBACCO NON-USER: CPT | Performed by: STUDENT IN AN ORGANIZED HEALTH CARE EDUCATION/TRAINING PROGRAM

## 2022-03-04 ASSESSMENT — PATIENT HEALTH QUESTIONNAIRE - PHQ9
SUM OF ALL RESPONSES TO PHQ QUESTIONS 1-9: 6
1. LITTLE INTEREST OR PLEASURE IN DOING THINGS: 0
10. IF YOU CHECKED OFF ANY PROBLEMS, HOW DIFFICULT HAVE THESE PROBLEMS MADE IT FOR YOU TO DO YOUR WORK, TAKE CARE OF THINGS AT HOME, OR GET ALONG WITH OTHER PEOPLE: 0
2. FEELING DOWN, DEPRESSED OR HOPELESS: 1
SUM OF ALL RESPONSES TO PHQ QUESTIONS 1-9: 6
5. POOR APPETITE OR OVEREATING: 1
6. FEELING BAD ABOUT YOURSELF - OR THAT YOU ARE A FAILURE OR HAVE LET YOURSELF OR YOUR FAMILY DOWN: 0
9. THOUGHTS THAT YOU WOULD BE BETTER OFF DEAD, OR OF HURTING YOURSELF: 0
8. MOVING OR SPEAKING SO SLOWLY THAT OTHER PEOPLE COULD HAVE NOTICED. OR THE OPPOSITE, BEING SO FIGETY OR RESTLESS THAT YOU HAVE BEEN MOVING AROUND A LOT MORE THAN USUAL: 1
3. TROUBLE FALLING OR STAYING ASLEEP: 1
4. FEELING TIRED OR HAVING LITTLE ENERGY: 1
SUM OF ALL RESPONSES TO PHQ9 QUESTIONS 1 & 2: 1
7. TROUBLE CONCENTRATING ON THINGS, SUCH AS READING THE NEWSPAPER OR WATCHING TELEVISION: 1

## 2022-03-04 NOTE — PROGRESS NOTES
[facility]  Department of Family Medicine      Patient:  Sanjuana Hardy 27 y.o. male     Date of Service: 3/4/22      Chief complaint:   Chief Complaint   Patient presents with    Fall     right knee pain         History ofPresent Illness   The patient is a 27 y.o. male  presented to the clinic with complaints as above. Right knee pain  -new issue  -fell in [facility] 3 days ago, feels like he hyperextended his knee  -had pain immediately afterwards  -hard to walk due to the pain  -walking makes pain worse, hard to put pressure on the knee  -pain is constant, feels like sharp pain in whole knee  -moving the knee makes pain worse  -has weakness due to pain     Penile rash  -new issue  -been going on for several months now  -rash is on the shaft of the penis  -somewhat itchy and painful  -tried to put anti itch cream on it, which did help  -denies any penile discharge, fever, or chills       Past Medical History:      Diagnosis Date    Acid reflux disease     ACL (anterior cruciate ligament) tear     bilateral    Bilateral knee pain     Chronic back pain     Depression     HIV (human immunodeficiency virus infection) ([facility]     Severe episode of recurrent major depressive disorder, with psychotic features ([facility] 12/25/2019    Substance abuse ([facility]     marijuana approx. twice/week    Use of cane as ambulatory aid        PastSurgical History:        Procedure Laterality Date    HERNIA REPAIR Left 2007    inguinal    KNEE SURGERY Left 9/2/2021    LEFT KNEE ARTHROSCOPIC ACL RECONSTRUCTION WITH AUTOGRAFT PARTIAL MEDIAL MENISCECTOMY performed by Makenna Palacios MD at [facility] OR       Allergies:     Other    Social History:   Social History     Socioeconomic History    Marital status: Single     Spouse name: Not on file    Number of children: Not on file    Years of education: Not on file    Highest education level: Not on file   Occupational History    Not on file   Tobacco Use  Smoking status: Never Smoker    Smokeless tobacco: Never Used   Vaping Use    Vaping Use: Former   Substance and Sexual Activity    Alcohol use: Never    Drug use: Not Currently     Types: Methamphetamines (Crystal Meth), Marijuana (Weed)     Comment: Marijuana twice per month    Sexual activity: Yes     Partners: Male     Comment: Uses condoms   Other Topics Concern    Not on file   Social History Narrative    Not on file     Social Determinants of Health     Financial Resource Strain: Low Risk     Difficulty of Paying Living Expenses: Not hard at all   Food Insecurity: No Food Insecurity    Worried About [facility] in the Last Year: Never true    [address] [employer] in the Last Year: Never true   Transportation Needs:     Lack of Transportation (Medical): Not on file    Lack of Transportation (Non-Medical): Not on file   Physical Activity:     Days of Exercise per Week: Not on file    Minutes of Exercise per Session: Not on file   Stress:     Feeling of Stress : Not on file   Social Connections:     Frequency of Communication with Friends and Family: Not on file    Frequency of Social Gatherings with Friends and Family: Not on file    Attends Alevism Services: Not on file    Active Member of [address] [place] [address] or Organizations: Not on file    Attends Club or Organization Meetings: Not on file    Marital Status: Not on file   Intimate Partner Violence:     Fear of Current or Ex-Partner: Not on file    Emotionally Abused: Not on file    Physically Abused: Not on file    Sexually Abused: Not on file   Housing Stability:     Unable to Pay for Housing in the Last Year: Not on file    Number of [place] in the Last Year: Not on file    Unstable Housing in the Last Year: Not on file        Family History:   No family history on file.     Review of Systems:   Review of Systems - as above     Physical Exam   Vitals: BP 98/60   Pulse 89   Temp 96.9 °F (36.1 °C) (Infrared)   Resp 18   Ht 5' 11\" (1.803 m)   Wt 209 lb (94.8 kg)   SpO2 98%   BMI 29.15 kg/m²   Physical Exam  Constitutional:       Appearance: He is well-developed. HENT:      Head: Normocephalic and atraumatic. Eyes:      General:         Right eye: No discharge. Left eye: No discharge. Conjunctiva/sclera: Conjunctivae normal.   Neck:      Trachea: No tracheal deviation. Cardiovascular:      Rate and Rhythm: Normal rate and regular rhythm. Heart sounds: Normal heart sounds. Pulmonary:      Effort: Pulmonary effort is normal. No respiratory distress. Breath sounds: Normal breath sounds. No wheezing. Abdominal:      General: Bowel sounds are normal. There is no distension. Palpations: Abdomen is soft. Tenderness: There is no abdominal tenderness. Genitourinary:      Musculoskeletal:         General: Tenderness present. Cervical back: Normal range of motion and neck supple. Comments: Large effusion of right knee, laxity in lcl on right knee   Skin:     General: Skin is warm and dry. Neurological:      Mental Status: He is alert. Psychiatric:         Behavior: Behavior normal.             Assessment and Plan       1. Acute pain of right knee  New issue  -Given mechanism, symptoms, and PE findings, concerns for LCL tear vs MCL vs other, will get imaging for further evaluation and refer to sports medicine as patient may need effusion drained and steroid injection vs other  - XR KNEE RIGHT (3 VIEWS); Future  - Brendan Guerrero MD, Sports Medicine, [facility]    2. Balanitis  New issue  -Advised keeping genitals clean, will trial antifungal to see if this improves his symptoms and get STD testing   - C.TRACHOMATIS N.GONORRHOEAE DNA; Future  - Clotrimazole 1 % OINT; Apply 1 each topically in the morning and at bedtime  Dispense: 56.7 g; Refill: 1    3.  HIV infection, unspecified symptom status ([facility]  Chronic issue  -Does not follow with specialist in [facility], will refer to specialist and get CD4 count  - Miscellaneous Sendout; Future  - ([facility]) - Michelle Alvarez MD, Infectious Disease, [place] ([facility])      Counseled regarding above diagnosis, including possible risks and complications,  especially if left uncontrolled. Counseled regarding the possible side effects, risks, benefits and alternatives to treatment;patient and/or guardian verbalizes understanding, agrees, feels comfortable with and wishes to proceed with above treatment plan. Call or go to [address] [place] if symptoms worsen or persist. Advised patient to call with any new medication issues, and, as applicable, read all Rx info from pharmacy to assure aware of all possible risks and side effects of medicationbefore taking. Patient and/or guardian given opportunity to ask questions/raise concerns. The patient verbalized comfort and understanding ofinstructions. I encourage further reading and education about your health conditions. Information on many health conditions is provided by [facility] [employer]: [email or web address] org/  Please bring any questions to me at your nextvisit. Return to Office: Return in about 3 months (around 6/4/2022) for f/u right knee pain . Medication List:    Current Outpatient Medications   Medication Sig Dispense Refill    Clotrimazole 1 % OINT Apply 1 each topically in the morning and at bedtime 56.7 g 1    sildenafil (VIAGRA) 100 MG tablet Take 1 tablet by mouth daily as needed for Erectile Dysfunction 20 tablet 5    ibuprofen (ADVIL;MOTRIN) 800 MG tablet Take 1 tablet by mouth every 6 hours as needed for Pain 40 tablet 2    Misc. Devices (CANE) MISC Use cane to aid in ambulation 1 each 0    BIKTARVY -25 MG TABS per tablet Take 1 tablet by mouth nightly       ketoconazole (NIZORAL) 2 % shampoo 4 times per week. Apply to the affected region allowed to dry for 5 minutes then rinse.   Afterward place Bactroban ointment over the affected

## 2022-03-08 ENCOUNTER — TELEPHONE (OUTPATIENT)
Dept: SPORTS MEDICINE | Age: 32
End: 2022-03-08

## 2022-03-08 LAB
C. TRACHOMATIS DNA ,URINE: NEGATIVE
N. GONORRHOEAE DNA, URINE: NEGATIVE
SOURCE: NORMAL

## 2022-03-15 ENCOUNTER — TELEPHONE (OUTPATIENT)
Dept: SPORTS MEDICINE | Age: 32
End: 2022-03-15

## 2022-05-02 DIAGNOSIS — L73.1 PSEUDOFOLLICULITIS BARBAE: ICD-10-CM

## 2022-05-03 RX ORDER — KETOCONAZOLE 20 MG/ML
SHAMPOO TOPICAL
Qty: 120 ML | Refills: 1 | Status: SHIPPED
Start: 2022-05-03 | End: 2022-05-17 | Stop reason: SDUPTHER

## 2022-05-17 DIAGNOSIS — N52.2 DRUG-INDUCED ERECTILE DYSFUNCTION: ICD-10-CM

## 2022-05-17 DIAGNOSIS — L73.1 PSEUDOFOLLICULITIS BARBAE: ICD-10-CM

## 2022-05-17 RX ORDER — KETOCONAZOLE 20 MG/ML
SHAMPOO TOPICAL
Qty: 120 ML | Refills: 1 | Status: SHIPPED
Start: 2022-05-17 | End: 2022-09-30 | Stop reason: SDUPTHER

## 2022-05-17 RX ORDER — SILDENAFIL 100 MG/1
100 TABLET, FILM COATED ORAL DAILY PRN
Qty: 20 TABLET | Refills: 5 | Status: SHIPPED
Start: 2022-05-17 | End: 2022-06-28 | Stop reason: SDUPTHER

## 2022-05-17 NOTE — TELEPHONE ENCOUNTER
----- Message from Tahira Dejesus sent at 5/16/2022  1:32 PM EDT -----  Subject: Refill Request    QUESTIONS  Name of Medication? sildenafil (VIAGRA) 100 MG tablet  Patient-reported dosage and instructions? 100 MG once daily   How many days do you have left? 0  Preferred Pharmacy? [address] phone number (if available)? [phone]  ---------------------------------------------------------------------------  --------------,  Name of Medication? ketoconazole (NIZORAL) 2 % shampoo  Patient-reported dosage and instructions? 3x weekly   How many days do you have left? 7  Preferred Pharmacy? [address] phone number (if available)? [phone]  Additional Information for Provider? Would like clarification on to use   ---------------------------------------------------------------------------  --------------  CALL BACK INFO  What is the best way for the office to contact you? OK to leave message on   voicemail  Preferred Call Back Phone Number? [phone]  ---------------------------------------------------------------------------  --------------  SCRIPT ANSWERS  Relationship to Patient?  Self

## 2022-06-28 ENCOUNTER — OFFICE VISIT (OUTPATIENT)
Dept: PRIMARY CARE CLINIC | Age: 32
End: 2022-06-28
Payer: COMMERCIAL

## 2022-06-28 VITALS
TEMPERATURE: 96.8 F | OXYGEN SATURATION: 97 % | DIASTOLIC BLOOD PRESSURE: 68 MMHG | RESPIRATION RATE: 20 BRPM | HEART RATE: 60 BPM | BODY MASS INDEX: 29.12 KG/M2 | WEIGHT: 208 LBS | HEIGHT: 71 IN | SYSTOLIC BLOOD PRESSURE: 100 MMHG

## 2022-06-28 DIAGNOSIS — B20 CURRENTLY ASYMPTOMATIC HIV INFECTION, WITH HISTORY OF HIV-RELATED ILLNESS (HCC): ICD-10-CM

## 2022-06-28 DIAGNOSIS — Z00.00 ANNUAL PHYSICAL EXAM: Primary | ICD-10-CM

## 2022-06-28 DIAGNOSIS — G89.29 CHRONIC MIDLINE LOW BACK PAIN WITHOUT SCIATICA: ICD-10-CM

## 2022-06-28 DIAGNOSIS — N52.2 DRUG-INDUCED ERECTILE DYSFUNCTION: ICD-10-CM

## 2022-06-28 DIAGNOSIS — M54.50 CHRONIC MIDLINE LOW BACK PAIN WITHOUT SCIATICA: ICD-10-CM

## 2022-06-28 DIAGNOSIS — R10.9 FLANK PAIN: ICD-10-CM

## 2022-06-28 LAB
BILIRUBIN, POC: NORMAL
BLOOD URINE, POC: NORMAL
CLARITY, POC: NORMAL
COLOR, POC: YELLOW
GLUCOSE URINE, POC: NORMAL
KETONES, POC: NORMAL
LEUKOCYTE EST, POC: NORMAL
NITRITE, POC: NORMAL
PH, POC: 7
PROTEIN, POC: NORMAL
SPECIFIC GRAVITY, POC: 1.02
UROBILINOGEN, POC: 0.2

## 2022-06-28 PROCEDURE — 81002 URINALYSIS NONAUTO W/O SCOPE: CPT | Performed by: STUDENT IN AN ORGANIZED HEALTH CARE EDUCATION/TRAINING PROGRAM

## 2022-06-28 PROCEDURE — 99395 PREV VISIT EST AGE 18-39: CPT | Performed by: STUDENT IN AN ORGANIZED HEALTH CARE EDUCATION/TRAINING PROGRAM

## 2022-06-28 RX ORDER — SILDENAFIL 100 MG/1
100 TABLET, FILM COATED ORAL DAILY PRN
Qty: 20 TABLET | Refills: 5 | Status: SHIPPED
Start: 2022-06-28 | End: 2022-09-30 | Stop reason: SDUPTHER

## 2022-06-28 RX ORDER — IBUPROFEN 800 MG/1
800 TABLET ORAL EVERY 6 HOURS PRN
Qty: 40 TABLET | Refills: 2 | Status: SHIPPED
Start: 2022-06-28 | End: 2022-08-10

## 2022-06-28 NOTE — PROGRESS NOTES
[facility]  Department of Family Medicine      Patient:  Dot Forrester 27 y.o. male     Date of Service: 6/28/22      Chief complaint:   Chief Complaint   Patient presents with    Annual Exam    Back Pain         History ofPresent Illness   The patient is a 27 y.o. male  presented to the clinic with complaints as above. Low back pain  -new issue  -started in January, no specific mechanism or trauma  -described as stabbing pain which is sharp which is twisting his back, which is constant  -worsened by walking  -laying flat on his back makes it better and lifting his legs up in the air helps it   -pain stays localized to his low back  -denies any numbness or tingling   -denies any weakness in his legs   -having associated right shoulder pain also     Past Medical History:      Diagnosis Date    Acid reflux disease     ACL (anterior cruciate ligament) tear     bilateral    Bilateral knee pain     Chronic back pain     Depression     [facility]     Severe episode of recurrent major depressive disorder, with psychotic features ([facility] 12/25/2019    Substance abuse ([facility]     marijuana approx. twice/week    Use of cane as ambulatory aid        PastSurgical History:        Procedure Laterality Date    HERNIA REPAIR Left 2007    inguinal    KNEE SURGERY Left 9/2/2021    LEFT KNEE ARTHROSCOPIC ACL RECONSTRUCTION WITH AUTOGRAFT PARTIAL MEDIAL MENISCECTOMY performed by Cheikh Moreno MD at [facility] OR       Allergies:     Other    Social History:   Social History     Socioeconomic History    Marital status: Single     Spouse name: Not on file    Number of children: Not on file    Years of education: Not on file    Highest education level: Not on file   Occupational History    Not on file   Tobacco Use    Smoking status: Never Smoker    Smokeless tobacco: Never Used   Vaping Use    Vaping Use: Former   Substance and Sexual Activity    Alcohol use: Never    Drug use: Not Currently     Types: Methamphetamines (Crystal Meth), Marijuana (Weed)     Comment: Marijuana twice per month    Sexual activity: Yes     Partners: Male     Comment: Uses condoms   Other Topics Concern    Not on file   Social History Narrative    Not on file     Social Determinants of Health     Financial Resource Strain: Low Risk     Difficulty of Paying Living Expenses: Not hard at all   Food Insecurity: No Food Insecurity    Worried About [address] in the Last Year: Never true    [facility] in the Last Year: Never true   Transportation Needs:     Lack of Transportation (Medical): Not on file    Lack of Transportation (Non-Medical): Not on file   Physical Activity:     Days of Exercise per Week: Not on file    Minutes of Exercise per Session: Not on file   Stress:     Feeling of Stress : Not on file   Social Connections:     Frequency of Communication with Friends and Family: Not on file    Frequency of Social Gatherings with Friends and Family: Not on file    Attends Mandaeism Services: Not on file    Active Member of [address] [place] [address] or Organizations: Not on file    Attends Club or Organization Meetings: Not on file    Marital Status: Not on file   Intimate Partner Violence:     Fear of Current or Ex-Partner: Not on file    Emotionally Abused: Not on file    Physically Abused: Not on file    Sexually Abused: Not on file   Housing Stability:     Unable to Pay for Housing in the Last Year: Not on file    Number of [place] in the Last Year: Not on file    Unstable Housing in the Last Year: Not on file        Family History:   No family history on file. Review of Systems:   Review of Systems - as above     Physical Exam   Vitals: /68   Pulse 60   Temp 96.8 °F (36 °C) (Infrared)   Resp 20   Ht 5' 11\" (1.803 m)   Wt 208 lb (94.3 kg)   SpO2 97%   BMI 29.01 kg/m²   Physical Exam  Constitutional:       Appearance: He is well-developed. not get a call from them, asked  to call and try and get patient an appt  -States compliant with his medication  - HIV RNA, Quantitative, PCR; Future      Counseled regarding above diagnosis, including possible risks and complications,  especially if left uncontrolled. Counseled regarding the possible side effects, risks, benefits and alternatives to treatment;patient and/or guardian verbalizes understanding, agrees, feels comfortable with and wishes to proceed with above treatment plan. Call or go to [address] if symptoms worsen or persist. Advised patient to call with any new medication issues, and, as applicable, read all Rx info from pharmacy to assure aware of all possible risks and side effects of medicationbefore taking. Patient and/or guardian given opportunity to ask questions/raise concerns. The patient verbalized comfort and understanding ofinstructions. I encourage further reading and education about your health conditions. Information on many health conditions is provided by [facility] [employer]: [email or web address] [email or web address]  Please bring any questions to me at your nextvisit. Return to Office: Return in about 1 month (around 7/28/2022) for f/u low back pain . Medication List:    Current Outpatient Medications   Medication Sig Dispense Refill    sildenafil (VIAGRA) 100 MG tablet Take 1 tablet by mouth daily as needed for Erectile Dysfunction 20 tablet 5    ibuprofen (ADVIL;MOTRIN) 800 MG tablet Take 1 tablet by mouth every 6 hours as needed for Pain 40 tablet 2    ketoconazole (NIZORAL) 2 % shampoo 4 times per week. Apply to the affected region allowed to dry for 5 minutes then rinse.   Afterward place Bactroban ointment over the affected regions as needed 120 mL 1    BIKTARVY -25 MG TABS per tablet Take 1 tablet by mouth nightly       Clotrimazole 1 % OINT Apply 1 each topically in the morning and at bedtime (Patient not taking: Reported on 6/28/2022) 56.7 g 1    naproxen (NAPROSYN) 250 MG tablet Take 1 tablet by mouth 2 times daily (with meals) (Patient not taking: Reported on 3/4/2022) 180 tablet 1    Misc. Devices (CANE) MISC Use cane to aid in ambulation (Patient not taking: Reported on 6/28/2022) 1 each 0    amphetamine-dextroamphetamine (ADDERALL XR) 20 MG extended release capsule Take 1 capsule by mouth every morning for 30 days. (Patient not taking: Reported on 10/18/2021) 30 capsule 0     No current facility-administered medications for this visit. Vassie Schilder,        This document may have been prepared at least partially through the use of voice recognition software. Although effort is taken to assure the accuracy ofthis document, it is possible that grammatical, syntax,  or spelling errors may occur.

## 2022-07-22 ENCOUNTER — TELEPHONE (OUTPATIENT)
Dept: PRIMARY CARE CLINIC | Age: 32
End: 2022-07-22

## 2022-07-22 NOTE — TELEPHONE ENCOUNTER
----- Message from Tonia Wray sent at 7/22/2022 10:58 AM EDT -----  Subject: Referral Request    Reason for referral request? pt would like a referral to pain management    pt was also told that he has to get his yearly done with another doctor    He would like to know if his PCP is welling to do the appt no since he   got his blood work done  please advise   Provider patient wants to be referred to(if known):     Provider Phone Number(if known):     Additional Information for Provider?   ---------------------------------------------------------------------------  --------------  [address]    [phone]; OK to leave message on voicemail  ---------------------------------------------------------------------------  --------------

## 2022-07-26 DIAGNOSIS — M54.9 SUBACUTE BACK PAIN: Primary | ICD-10-CM

## 2022-07-26 NOTE — TELEPHONE ENCOUNTER
I believe our last visit that we did was a well visit. I will refer him to pain management.     Thank Irineo Paz

## 2022-08-10 ENCOUNTER — OFFICE VISIT (OUTPATIENT)
Dept: PAIN MANAGEMENT | Age: 32
End: 2022-08-10
Payer: COMMERCIAL

## 2022-08-10 VITALS
HEART RATE: 90 BPM | HEIGHT: 71 IN | DIASTOLIC BLOOD PRESSURE: 75 MMHG | BODY MASS INDEX: 28.84 KG/M2 | SYSTOLIC BLOOD PRESSURE: 122 MMHG | WEIGHT: 206 LBS | OXYGEN SATURATION: 97 % | TEMPERATURE: 97.2 F

## 2022-08-10 DIAGNOSIS — M51.36 DDD (DEGENERATIVE DISC DISEASE), LUMBAR: ICD-10-CM

## 2022-08-10 DIAGNOSIS — F54 PSYCHOLOGICAL FACTORS AFFECTING MEDICAL CONDITION: ICD-10-CM

## 2022-08-10 DIAGNOSIS — M47.817 LUMBOSACRAL SPONDYLOSIS WITHOUT MYELOPATHY: Primary | ICD-10-CM

## 2022-08-10 PROCEDURE — G8417 CALC BMI ABV UP PARAM F/U: HCPCS | Performed by: ANESTHESIOLOGY

## 2022-08-10 PROCEDURE — 1036F TOBACCO NON-USER: CPT | Performed by: ANESTHESIOLOGY

## 2022-08-10 PROCEDURE — 99204 OFFICE O/P NEW MOD 45 MIN: CPT

## 2022-08-10 PROCEDURE — 99204 OFFICE O/P NEW MOD 45 MIN: CPT | Performed by: ANESTHESIOLOGY

## 2022-08-10 PROCEDURE — G8427 DOCREV CUR MEDS BY ELIG CLIN: HCPCS | Performed by: ANESTHESIOLOGY

## 2022-08-10 RX ORDER — METHYLPREDNISOLONE 4 MG/1
TABLET ORAL
Qty: 1 KIT | Refills: 0 | Status: SHIPPED | OUTPATIENT
Start: 2022-08-10 | End: 2022-08-16

## 2022-08-10 NOTE — PROGRESS NOTES
Patient:  Doris Sarabia,  1990  Date of Service:  8/10/22      Do you currently have any of the following:    Fever: No  Headache:  No  Cough: No  Shortness of breath: No  Exposed to anyone with these symptoms: No       Patient presents with complaints of back and right shoulder pain that started 2 years ago and has been getting worse. He states the pain began following fall    Pain is constant and is described as aching and sharp. He rates the pain as a 10/10 on his worst day , 5/10 on his best day, and a 5/10 on average on the VAS scale. Pain does radiate to both lower extremities. He  has weakness of the both lower extremities. Alleviating factors include: rest.  Aggravating factors include:  movement. He states that the pain does keep him from sleeping at night. He took his last dose of Tylenol 2 days ago. He is not on NSAIDS and  is not on anticoagulation medications to include none and is managed by none. Previous treatments: none per patient     Personal Expectations from this treatment: increase activity and decrease pain    There were no vitals taken for this visit. No LMP for male patient.

## 2022-08-10 NOTE — PROGRESS NOTES
[facility]        [address] [place] [address]  Dept: [phone]          Consult Note      Patient:  ALDEN Arriaga 1990    Date of Service:  08/10/22     Requesting Physician:  Dequan Avila DO    Reason for Consult:      Patient presents with complaints of low back pain    HISTORY OF PRESENT ILLNESS:      Mr. Rajesh Blancas is a 34 y.o. male presented today to [facility] for evaluation of  chronic low back pain for over 2 yrs. Pain in the lumbar area - denies significant LE radicular pain. He noticed intermittent tingling of the LE. Pain is constant and is described as aching and stabbing. He  does not have weakness of the both lower extremities     Patient does not have bladder or bowel dysfunction. Alleviating factors include: rest.  Aggravating factors include: movement, bending, lifting. Pain causes functional limitations/ limits Adl's : Yes    Nursing notes and details of the pain history reviewed. Please see intake notes for details. NOTE:  H/o HIV- on treatment. .  H/o anxiety/ ADD- follows with psych. Previous treatments:   Physical Therapy : no     Medications: - NSAID's : yes     Spine Surgeries: no    Interventional Pain procedures/ nerve blocks: no    He has not been on anticoagulation medications. .    H/O Smoking: no  H/O alcohol abuse : no  H/O Illicit drug use : H/o THC use +    Employment: unemployed    Imaging:   Xray LS spine: 2021:  FINDINGS:   No fracture, dislocation, spondylolysis or spondylolisthesis. Disc spaces   are preserved. There are 2 fenestrated metallic structures seen at the level   of the pelvis which could be outside the patient or possibly within the GI   tract. Clinical correlation is recommended. Impression   Unremarkable L-spine. Fenestrated metallic structures in the pelvis which   could be outside the patient or possibly within the GI tract. Past Medical History:   Diagnosis Date    Acid reflux disease     ACL (anterior cruciate ligament) tear     bilateral    Bilateral knee pain     Chronic back pain     Depression     HIV (human [facility] virus infection) [facility]     Severe episode of recurrent major depressive disorder, with psychotic features ([facility] 12/25/2019    [facility]     marijuana approx. twice/week    Use of cane as ambulatory aid        Past Surgical History:   Procedure Laterality Date    HERNIA REPAIR Left 2007    inguinal    KNEE SURGERY Left 9/2/2021    LEFT KNEE ARTHROSCOPIC ACL RECONSTRUCTION WITH AUTOGRAFT PARTIAL MEDIAL MENISCECTOMY performed [facility]imes [facility]       Prior to Admission medications    Medication Sig Start Date End Date Taking? Authorizing Provider   sildenafil (VIAGRA) 100 MG tablet Take 1 tablet by mouth daily as needed for Erectile Dysfunction 6/28/22  Yes Leonel Dykes DO   ketoconazole (NIZORAL) 2 % shampoo 4 times per week. Apply to the affected region allowed to dry for 5 minutes then rinse. Afterward place Bactroban ointment over the affected regions as needed  Patient taking differently: 4 times per week. Apply to the affected region allowed to dry for 5 minutes then rinse.   Afterward place Bactroban ointment over the affected regions as needed 5/17/22  Yes Leonel Dykes DO   BIKTARVY -25 MG TABS per tablet Take 1 tablet by mouth nightly  11/12/20  Yes Historical Provider, MD   Clotrimazole 1 % OINT Apply 1 each topically in the morning and at bedtime  Patient not taking: No sig reported 3/4/22   Abhishek Hernandez DO       Allergies   Allergen Reactions    Other      Environmental allergies -- hair, grass, carpet - per pt        Social History     Socioeconomic History    Marital status: Single     Spouse name: Not on file    Number of children: Not on file    Years of education: Not on file    Highest education level: Not on file   Occupational History    Not on file   Tobacco Use    Smoking status: Never    Smokeless tobacco: Never   Vaping Use    Vaping Use: Former   Substance and Sexual Activity    Alcohol use: Never    Drug use: Not Currently     Types: Methamphetamines (Crystal Meth), Marijuana (Weed)     Comment: Marijuana twice per month    Sexual activity: Yes     Partners: Male     Comment: Uses condoms   Other Topics Concern    Not on file   Social History Narrative    Not on file     Social Determinants of Health     Financial Resource Strain: Low Risk     Difficulty of Paying Living Expenses: Not hard at all   Food Insecurity: No Food Insecurity    Worried About Running Out of Food in the Last Year: Never true    Ran Out of Food in the Last Year: Never true   Transportation Needs: Not on file   Physical Activity: Not on file   Stress: Not on file   Social Connections: Not on file   Intimate Partner Violence: Not on file   Housing Stability: Not on file       Family History   Problem Relation Age of Onset    No Known Problems Mother     No Known Problems Father        REVIEW OF SYSTEMS:     Patient specifically denies fever/chills, chest pain, shortness of breath, new bowel or bladder complaints. All other review of systems was negative. Review of Systems - documented reviewed.     PHYSICAL EXAMINATION:      /75   Pulse 90   Temp 97.2 °F (36.2 °C) (Infrared)   Ht 5' 11\" (1.803 m)   Wt 206 lb (93.4 kg)   SpO2 97%   BMI 28.73 kg/m²     General:      General appearance:  Pleasant and well-hydrated, in no distress and A & O x 3  Build:Normal Weight  Function: Rises from seated position easily and Moves about room without difficulty    HEENT:    Head:normocephalic, atraumatic  Pupils:regular, round, equal  Sclera: icterus absent    Lungs:    Breathing:normal breathing pattern     CVS:     RRR    Abdomen:    Shape:non-distended and normal  Tenderness:none  Guarding:none    Cervical spine:    Inspection:normal  Palpation:tenderness paravertebral muscles, tenderness trapezium, left, right and positive. Range of motion:Normal flexion, extension, rotation bilaterally and is not painful. Thoracic spine:     Spine inspection:normal   Palpation:No tenderness over the midline and paraspinal area, bilaterally  Range of motion:normal in flexion, extension rotation bilateral and is not painful. Lumbar spine:    Spine inspection: Normal   Palpation: Tenderness paravertebral muscles Yes bilaterally  Range of motion: Decreased, flexion Decreased, Lateral bending, extension and rotation bilaterally reduced is painful. Lumbar facet tenderness +  Sacroiliac joint tenderness No bilaterally  JERI test: negative bilaterally  Gaenslen's test:negative bilaterally   Piriformis tenderness: negative bilaterally  SLR : negative bilaterally    Musculoskeletal:    Trigger points no    Extremities:    Tremors:None bilaterally upper and lower  Edema:no    Neurological:    Sensory: Normal to light touch     Motor:   Right  5/5              Left  5/5               Right Bicep 5/5           Left Bicep 5/5              Right Triceps 5/5       Left Triceps 5/5          Right Deltoid 5/5     Left Deltoid 5/5                  Right Quadriceps 5/5          Left Quadriceps 5/5           Right Gastrocnemius 5/5    Left Gastrocnemius 5/5  Right Ant Tibialis 5/5  Left Ant Tibialis 5/5    Reflexes:    B/l Le equal- reduced    Dermatology:    Skin:no rashes or lesions noted    Assessment/Plan:     Diagnosis Orders   1. Lumbosacral spondylosis without myelopathy  [facility] [place]      2. DDD (degenerative disc disease), lumbar  [facility] [place] [place]      3. Psychological factors affecting medical condition            34 y.o. male with h/o chronic low back pain - predominantly axial in nature. Denies significant LE radicular complaints. Features of facet pain. Prior X ray LS spine reviewed.     There is another order for X ray recently- still pending. H/o HIV- on meds. H/o THC use +    PLAN:  Physical therapy    Update Xray LS spine - already has an order form his PCP    Medrol dose pack. NSAID's for prn use ( after completing Medrol). Non opioid treatment options discussed. If significant pain, can consider advanced imaging- MRI/ spine interventions. F/u prn. Counseling : Patient encouraged to stay active and to continue Regular home exercise program as tolerated - stretching / strengthening. Treatment plan discussed with the patient including medication and procedure side effects. Controlled Substances Monitoring: [place]AR[place] reviewed.     Em Whalen MD    CC:    [address]  [facility]

## 2022-09-13 ENCOUNTER — TELEPHONE (OUTPATIENT)
Dept: PRIMARY CARE CLINIC | Age: 32
End: 2022-09-13

## 2022-09-13 NOTE — TELEPHONE ENCOUNTER
Patient no showed today for the 5th time today. Dr. Phil Segura is dismissing patient due to non-compliance.      Will send dismissal letter

## 2022-09-28 DIAGNOSIS — L73.1 PSEUDOFOLLICULITIS BARBAE: ICD-10-CM

## 2022-09-28 RX ORDER — KETOCONAZOLE 20 MG/ML
SHAMPOO TOPICAL
Qty: 120 ML | Refills: 0 | OUTPATIENT
Start: 2022-09-28

## 2022-09-30 DIAGNOSIS — M54.6 ACUTE LEFT-SIDED THORACIC BACK PAIN: ICD-10-CM

## 2022-09-30 DIAGNOSIS — L73.1 PSEUDOFOLLICULITIS BARBAE: ICD-10-CM

## 2022-09-30 DIAGNOSIS — N52.2 DRUG-INDUCED ERECTILE DYSFUNCTION: ICD-10-CM

## 2022-09-30 DIAGNOSIS — N48.1 BALANITIS: ICD-10-CM

## 2022-09-30 RX ORDER — KETOCONAZOLE 20 MG/ML
SHAMPOO TOPICAL
Qty: 120 ML | Refills: 0 | Status: SHIPPED | OUTPATIENT
Start: 2022-09-30

## 2022-09-30 RX ORDER — TIZANIDINE 4 MG/1
4 TABLET ORAL 3 TIMES DAILY
Qty: 90 TABLET | Refills: 5 | Status: SHIPPED | OUTPATIENT
Start: 2022-09-30

## 2022-09-30 RX ORDER — SILDENAFIL 100 MG/1
100 TABLET, FILM COATED ORAL DAILY PRN
Qty: 20 TABLET | Refills: 0 | Status: SHIPPED | OUTPATIENT
Start: 2022-09-30

## 2022-09-30 NOTE — TELEPHONE ENCOUNTER
Patient called to ask why he was dismissed from office and I did explain why. Patient was in agreement with not showing up , he stated he's starting to grow and working on priorities.  Wanted to let Dr know he appreciated his services he gave to patient while in Dr's care           sildenafil (VIAGRA) 100 MG tablet      ketoconazole (NIZORAL) 2 % shampoo     Clotrimazole 1 % OINT          tiZANidine (ZANAFLEX) 4 MG tablet [[id number]]  DISCONTINUED   This medication I see last filled by a Dr Ronnie Maher , If I am  not mistaken      Patient also asking if Dr can refill Adderall   New therapist usually fills Adderall but therapist is out of town and per the therapist office they told patient to call PCP

## 2022-10-19 DIAGNOSIS — N52.2 DRUG-INDUCED ERECTILE DYSFUNCTION: ICD-10-CM

## 2022-10-19 RX ORDER — SILDENAFIL 100 MG/1
TABLET, FILM COATED ORAL
Qty: 10 TABLET | Refills: 0 | OUTPATIENT
Start: 2022-10-19

## 2023-03-15 ENCOUNTER — HOSPITAL ENCOUNTER (EMERGENCY)
Age: 33
Discharge: HOME | End: 2023-03-15

## 2023-03-15 VITALS — HEART RATE: 89 BPM | OXYGEN SATURATION: 98 % | TEMPERATURE: 97.3 F

## 2023-04-21 DIAGNOSIS — N52.2 DRUG-INDUCED ERECTILE DYSFUNCTION: ICD-10-CM

## 2023-04-21 RX ORDER — SILDENAFIL 100 MG/1
100 TABLET, FILM COATED ORAL DAILY PRN
Qty: 5 TABLET | Refills: 0 | Status: SHIPPED | OUTPATIENT
Start: 2023-04-21

## 2023-05-05 ENCOUNTER — HOSPITAL ENCOUNTER (INPATIENT)
Age: 33
LOS: 3 days | Discharge: HOME | DRG: 776 | End: 2023-05-08
Attending: EMERGENCY MEDICINE | Admitting: PSYCHIATRY & NEUROLOGY
Payer: COMMERCIAL

## 2023-05-05 ENCOUNTER — APPOINTMENT (OUTPATIENT)
Dept: CT IMAGING | Age: 33
DRG: 776 | End: 2023-05-05
Payer: COMMERCIAL

## 2023-05-05 DIAGNOSIS — R10.9 ABDOMINAL PAIN, UNSPECIFIED ABDOMINAL LOCATION: ICD-10-CM

## 2023-05-05 DIAGNOSIS — Z76.89 ENCOUNTER FOR PSYCHIATRIC ASSESSMENT: Primary | ICD-10-CM

## 2023-05-05 PROBLEM — F23 ACUTE PSYCHOSIS (HCC): Status: ACTIVE | Noted: 2023-05-05

## 2023-05-05 PROBLEM — F19.94 SUBSTANCE INDUCED MOOD DISORDER (HCC): Status: ACTIVE | Noted: 2023-05-05

## 2023-05-05 PROBLEM — F23 ACUTE PSYCHOSIS (HCC): Status: RESOLVED | Noted: 2023-05-05 | Resolved: 2023-05-05

## 2023-05-05 PROBLEM — F19.10 POLYSUBSTANCE ABUSE (HCC): Status: ACTIVE | Noted: 2023-05-05

## 2023-05-05 LAB
ALBUMIN SERPL-MCNC: 3.9 G/DL (ref 3.5–5.2)
ALP SERPL-CCNC: 67 U/L (ref 40–129)
ALT SERPL-CCNC: 20 U/L (ref 0–40)
AMPHET UR QL SCN: POSITIVE
ANION GAP SERPL CALCULATED.3IONS-SCNC: 10 MMOL/L (ref 7–16)
APAP SERPL-MCNC: <5 MCG/ML (ref 10–30)
AST SERPL-CCNC: 23 U/L (ref 0–39)
BARBITURATES UR QL SCN: NOT DETECTED
BASOPHILS # BLD: 0.02 E9/L (ref 0–0.2)
BASOPHILS NFR BLD: 0.2 % (ref 0–2)
BENZODIAZ UR QL SCN: NOT DETECTED
BILIRUB SERPL-MCNC: 0.2 MG/DL (ref 0–1.2)
BILIRUB UR QL STRIP: NEGATIVE
BUN SERPL-MCNC: 11 MG/DL (ref 6–20)
CALCIUM SERPL-MCNC: 9.6 MG/DL (ref 8.6–10.2)
CANNABINOIDS UR QL SCN: POSITIVE
CHLORIDE SERPL-SCNC: 104 MMOL/L (ref 98–107)
CK SERPL-CCNC: <7 U/L (ref 20–200)
CLARITY UR: CLEAR
CO2 SERPL-SCNC: 25 MMOL/L (ref 22–29)
COCAINE UR QL SCN: NOT DETECTED
COLOR UR: YELLOW
CREAT SERPL-MCNC: 1.1 MG/DL (ref 0.7–1.2)
DRUG SCREEN COMMENT UR-IMP: ABNORMAL
EKG ATRIAL RATE: 72 BPM
EKG P AXIS: 65 DEGREES
EKG P-R INTERVAL: 148 MS
EKG Q-T INTERVAL: 350 MS
EKG QRS DURATION: 90 MS
EKG QTC CALCULATION (BAZETT): 383 MS
EKG R AXIS: 69 DEGREES
EKG T AXIS: 33 DEGREES
EKG VENTRICULAR RATE: 72 BPM
EOSINOPHIL # BLD: 0.19 E9/L (ref 0.05–0.5)
EOSINOPHIL NFR BLD: 2.4 % (ref 0–6)
ERYTHROCYTE [DISTWIDTH] IN BLOOD BY AUTOMATED COUNT: 13.3 FL (ref 11.5–15)
ETHANOLAMINE SERPL-MCNC: <10 MG/DL (ref 0–0.08)
FENTANYL SCREEN, URINE: NOT DETECTED
GLUCOSE SERPL-MCNC: 88 MG/DL (ref 74–99)
GLUCOSE UR STRIP-MCNC: NEGATIVE MG/DL
HCT VFR BLD AUTO: 43.7 % (ref 37–54)
HGB BLD-MCNC: 14.2 G/DL (ref 12.5–16.5)
HGB UR QL STRIP: NEGATIVE
IMM GRANULOCYTES # BLD: 0.02 E9/L
IMM GRANULOCYTES NFR BLD: 0.2 % (ref 0–5)
KETONES UR STRIP-MCNC: NEGATIVE MG/DL
LEUKOCYTE ESTERASE UR QL STRIP: NEGATIVE
LYMPHOCYTES # BLD: 2.71 E9/L (ref 1.5–4)
LYMPHOCYTES NFR BLD: 33.6 % (ref 20–42)
MAGNESIUM SERPL-MCNC: 1.9 MG/DL (ref 1.6–2.6)
MCH RBC QN AUTO: 30.1 PG (ref 26–35)
MCHC RBC AUTO-ENTMCNC: 32.5 % (ref 32–34.5)
MCV RBC AUTO: 92.6 FL (ref 80–99.9)
METHADONE UR QL SCN: NOT DETECTED
MONOCYTES # BLD: 0.73 E9/L (ref 0.1–0.95)
MONOCYTES NFR BLD: 9.1 % (ref 2–12)
NEUTROPHILS # BLD: 4.39 E9/L (ref 1.8–7.3)
NEUTS SEG NFR BLD: 54.5 % (ref 43–80)
NITRITE UR QL STRIP: NEGATIVE
OPIATES UR QL SCN: NOT DETECTED
OXYCODONE URINE: NOT DETECTED
PCP UR QL SCN: NOT DETECTED
PH UR STRIP: 7 [PH] (ref 5–9)
PLATELET # BLD AUTO: 258 E9/L (ref 130–450)
PMV BLD AUTO: 9.3 FL (ref 7–12)
POTASSIUM SERPL-SCNC: 4.2 MMOL/L (ref 3.5–5)
PROT SERPL-MCNC: 7.1 G/DL (ref 6.4–8.3)
PROT UR STRIP-MCNC: NEGATIVE MG/DL
RBC # BLD AUTO: 4.72 E12/L (ref 3.8–5.8)
SALICYLATES SERPL-MCNC: <0.3 MG/DL (ref 0–30)
SODIUM SERPL-SCNC: 139 MMOL/L (ref 132–146)
SP GR UR STRIP: 1.02 (ref 1–1.03)
UROBILINOGEN UR STRIP-ACNC: 0.2 E.U./DL
VALPROATE SERPL-MCNC: 3 MCG/ML (ref 50–100)
WBC # BLD: 8.1 E9/L (ref 4.5–11.5)

## 2023-05-05 PROCEDURE — 99285 EMERGENCY DEPT VISIT HI MDM: CPT

## 2023-05-05 PROCEDURE — 74177 CT ABD & PELVIS W/CONTRAST: CPT

## 2023-05-05 PROCEDURE — 93005 ELECTROCARDIOGRAM TRACING: CPT | Performed by: EMERGENCY MEDICINE

## 2023-05-05 PROCEDURE — 80053 COMPREHEN METABOLIC PANEL: CPT

## 2023-05-05 PROCEDURE — 82550 ASSAY OF CK (CPK): CPT

## 2023-05-05 PROCEDURE — 6370000000 HC RX 637 (ALT 250 FOR IP): Performed by: PSYCHIATRY & NEUROLOGY

## 2023-05-05 PROCEDURE — 36415 COLL VENOUS BLD VENIPUNCTURE: CPT

## 2023-05-05 PROCEDURE — 80164 ASSAY DIPROPYLACETIC ACD TOT: CPT

## 2023-05-05 PROCEDURE — 93010 ELECTROCARDIOGRAM REPORT: CPT | Performed by: INTERNAL MEDICINE

## 2023-05-05 PROCEDURE — 81003 URINALYSIS AUTO W/O SCOPE: CPT

## 2023-05-05 PROCEDURE — 82077 ASSAY SPEC XCP UR&BREATH IA: CPT

## 2023-05-05 PROCEDURE — 83735 ASSAY OF MAGNESIUM: CPT

## 2023-05-05 PROCEDURE — 1240000000 HC EMOTIONAL WELLNESS R&B

## 2023-05-05 PROCEDURE — 85025 COMPLETE CBC W/AUTO DIFF WBC: CPT

## 2023-05-05 PROCEDURE — 80143 DRUG ASSAY ACETAMINOPHEN: CPT

## 2023-05-05 PROCEDURE — 6370000000 HC RX 637 (ALT 250 FOR IP): Performed by: NURSE PRACTITIONER

## 2023-05-05 PROCEDURE — 80307 DRUG TEST PRSMV CHEM ANLYZR: CPT

## 2023-05-05 PROCEDURE — 80179 DRUG ASSAY SALICYLATE: CPT

## 2023-05-05 PROCEDURE — 6360000004 HC RX CONTRAST MEDICATION: Performed by: RADIOLOGY

## 2023-05-05 RX ORDER — HYDROXYZINE PAMOATE 25 MG/1
50 CAPSULE ORAL 3 TIMES DAILY PRN
Status: DISCONTINUED | OUTPATIENT
Start: 2023-05-05 | End: 2023-05-08 | Stop reason: HOSPADM

## 2023-05-05 RX ORDER — TIZANIDINE 4 MG/1
4 TABLET ORAL 3 TIMES DAILY
Status: DISCONTINUED | OUTPATIENT
Start: 2023-05-05 | End: 2023-05-05

## 2023-05-05 RX ORDER — HALOPERIDOL 5 MG/ML
5 INJECTION INTRAMUSCULAR EVERY 6 HOURS PRN
Status: DISCONTINUED | OUTPATIENT
Start: 2023-05-05 | End: 2023-05-08 | Stop reason: HOSPADM

## 2023-05-05 RX ORDER — NICOTINE 21 MG/24HR
1 PATCH, TRANSDERMAL 24 HOURS TRANSDERMAL DAILY
Status: DISCONTINUED | OUTPATIENT
Start: 2023-05-05 | End: 2023-05-05

## 2023-05-05 RX ORDER — ACETAMINOPHEN 325 MG/1
650 TABLET ORAL EVERY 6 HOURS PRN
Status: DISCONTINUED | OUTPATIENT
Start: 2023-05-05 | End: 2023-05-08 | Stop reason: HOSPADM

## 2023-05-05 RX ORDER — LANOLIN ALCOHOL/MO/W.PET/CERES
3 CREAM (GRAM) TOPICAL NIGHTLY
Status: DISCONTINUED | OUTPATIENT
Start: 2023-05-05 | End: 2023-05-08 | Stop reason: HOSPADM

## 2023-05-05 RX ORDER — HALOPERIDOL 5 MG/1
5 TABLET ORAL EVERY 6 HOURS PRN
Status: DISCONTINUED | OUTPATIENT
Start: 2023-05-05 | End: 2023-05-08 | Stop reason: HOSPADM

## 2023-05-05 RX ORDER — DIVALPROEX SODIUM 250 MG/1
250 TABLET, DELAYED RELEASE ORAL EVERY 12 HOURS SCHEDULED
Status: DISCONTINUED | OUTPATIENT
Start: 2023-05-05 | End: 2023-05-08 | Stop reason: HOSPADM

## 2023-05-05 RX ORDER — OLANZAPINE 5 MG/1
5 TABLET ORAL NIGHTLY
Status: DISCONTINUED | OUTPATIENT
Start: 2023-05-05 | End: 2023-05-08 | Stop reason: HOSPADM

## 2023-05-05 RX ORDER — TIZANIDINE 4 MG/1
4 TABLET ORAL 3 TIMES DAILY PRN
Status: DISCONTINUED | OUTPATIENT
Start: 2023-05-05 | End: 2023-05-08 | Stop reason: HOSPADM

## 2023-05-05 RX ORDER — MAGNESIUM HYDROXIDE/ALUMINUM HYDROXICE/SIMETHICONE 120; 1200; 1200 MG/30ML; MG/30ML; MG/30ML
30 SUSPENSION ORAL PRN
Status: DISCONTINUED | OUTPATIENT
Start: 2023-05-05 | End: 2023-05-08 | Stop reason: HOSPADM

## 2023-05-05 RX ADMIN — MELATONIN 3 MG ORAL TABLET 3 MG: 3 TABLET ORAL at 22:01

## 2023-05-05 RX ADMIN — BICTEGRAVIR SODIUM, EMTRICITABINE, AND TENOFOVIR ALAFENAMIDE FUMARATE 1 TABLET: 50; 200; 25 TABLET ORAL at 22:03

## 2023-05-05 RX ADMIN — DIVALPROEX SODIUM 250 MG: 250 TABLET, DELAYED RELEASE ORAL at 22:01

## 2023-05-05 RX ADMIN — IOPAMIDOL 75 ML: 755 INJECTION, SOLUTION INTRAVENOUS at 03:22

## 2023-05-05 RX ADMIN — HYDROXYZINE PAMOATE 50 MG: 25 CAPSULE ORAL at 20:05

## 2023-05-05 RX ADMIN — ACETAMINOPHEN 650 MG: 325 TABLET ORAL at 22:01

## 2023-05-05 RX ADMIN — TIZANIDINE 4 MG: 4 TABLET ORAL at 14:45

## 2023-05-05 ASSESSMENT — PAIN DESCRIPTION - LOCATION
LOCATION: SCROTUM;BACK
LOCATION: BACK
LOCATION: BACK

## 2023-05-05 ASSESSMENT — PAIN DESCRIPTION - DESCRIPTORS
DESCRIPTORS: ACHING
DESCRIPTORS: ACHING;DISCOMFORT;TENDER

## 2023-05-05 ASSESSMENT — SLEEP AND FATIGUE QUESTIONNAIRES
AVERAGE NUMBER OF SLEEP HOURS: 8
DO YOU USE A SLEEP AID: NO
DO YOU HAVE DIFFICULTY SLEEPING: NO
AVERAGE NUMBER OF SLEEP HOURS: 7
DO YOU USE A SLEEP AID: NO
DO YOU HAVE DIFFICULTY SLEEPING: NO

## 2023-05-05 ASSESSMENT — LIFESTYLE VARIABLES
HOW OFTEN DO YOU HAVE A DRINK CONTAINING ALCOHOL: NEVER
HOW OFTEN DO YOU HAVE A DRINK CONTAINING ALCOHOL: NEVER
HOW MANY STANDARD DRINKS CONTAINING ALCOHOL DO YOU HAVE ON A TYPICAL DAY: PATIENT DOES NOT DRINK
HOW MANY STANDARD DRINKS CONTAINING ALCOHOL DO YOU HAVE ON A TYPICAL DAY: PATIENT DOES NOT DRINK

## 2023-05-05 ASSESSMENT — PAIN SCALES - GENERAL
PAINLEVEL_OUTOF10: 9
PAINLEVEL_OUTOF10: 0
PAINLEVEL_OUTOF10: 10
PAINLEVEL_OUTOF10: 4
PAINLEVEL_OUTOF10: 8

## 2023-05-05 ASSESSMENT — PAIN DESCRIPTION - ORIENTATION
ORIENTATION: RIGHT;MID
ORIENTATION: LOWER

## 2023-05-05 ASSESSMENT — PAIN DESCRIPTION - PAIN TYPE: TYPE: CHRONIC PAIN

## 2023-05-05 ASSESSMENT — PAIN - FUNCTIONAL ASSESSMENT
PAIN_FUNCTIONAL_ASSESSMENT: ACTIVITIES ARE NOT PREVENTED
PAIN_FUNCTIONAL_ASSESSMENT: PREVENTS OR INTERFERES SOME ACTIVE ACTIVITIES AND ADLS
PAIN_FUNCTIONAL_ASSESSMENT: 0-10

## 2023-05-05 ASSESSMENT — PAIN DESCRIPTION - ONSET: ONSET: ON-GOING

## 2023-05-05 ASSESSMENT — PAIN DESCRIPTION - FREQUENCY: FREQUENCY: CONTINUOUS

## 2023-05-05 ASSESSMENT — PATIENT HEALTH QUESTIONNAIRE - PHQ9: SUM OF ALL RESPONSES TO PHQ QUESTIONS 1-9: 0

## 2023-05-06 PROCEDURE — 6370000000 HC RX 637 (ALT 250 FOR IP): Performed by: NURSE PRACTITIONER

## 2023-05-06 PROCEDURE — 1240000000 HC EMOTIONAL WELLNESS R&B

## 2023-05-06 PROCEDURE — 6370000000 HC RX 637 (ALT 250 FOR IP): Performed by: PSYCHIATRY & NEUROLOGY

## 2023-05-06 RX ADMIN — BICTEGRAVIR SODIUM, EMTRICITABINE, AND TENOFOVIR ALAFENAMIDE FUMARATE 1 TABLET: 50; 200; 25 TABLET ORAL at 21:02

## 2023-05-06 RX ADMIN — OLANZAPINE 5 MG: 5 TABLET, FILM COATED ORAL at 21:02

## 2023-05-06 RX ADMIN — DIVALPROEX SODIUM 250 MG: 250 TABLET, DELAYED RELEASE ORAL at 21:02

## 2023-05-06 RX ADMIN — DIVALPROEX SODIUM 250 MG: 250 TABLET, DELAYED RELEASE ORAL at 08:43

## 2023-05-06 RX ADMIN — TIZANIDINE 4 MG: 4 TABLET ORAL at 09:23

## 2023-05-06 RX ADMIN — MELATONIN 3 MG ORAL TABLET 3 MG: 3 TABLET ORAL at 21:02

## 2023-05-06 ASSESSMENT — PAIN SCALES - GENERAL
PAINLEVEL_OUTOF10: 0
PAINLEVEL_OUTOF10: 0
PAINLEVEL_OUTOF10: 6

## 2023-05-06 ASSESSMENT — PAIN DESCRIPTION - DESCRIPTORS: DESCRIPTORS: SHARP;STABBING;DISCOMFORT

## 2023-05-06 ASSESSMENT — PAIN DESCRIPTION - LOCATION: LOCATION: BACK

## 2023-05-07 PROCEDURE — 1240000000 HC EMOTIONAL WELLNESS R&B

## 2023-05-07 PROCEDURE — 6370000000 HC RX 637 (ALT 250 FOR IP): Performed by: NURSE PRACTITIONER

## 2023-05-07 PROCEDURE — 6370000000 HC RX 637 (ALT 250 FOR IP): Performed by: PSYCHIATRY & NEUROLOGY

## 2023-05-07 RX ADMIN — MELATONIN 3 MG ORAL TABLET 3 MG: 3 TABLET ORAL at 20:30

## 2023-05-07 RX ADMIN — OLANZAPINE 5 MG: 5 TABLET, FILM COATED ORAL at 20:30

## 2023-05-07 RX ADMIN — ACETAMINOPHEN 650 MG: 325 TABLET ORAL at 08:52

## 2023-05-07 RX ADMIN — DIVALPROEX SODIUM 250 MG: 250 TABLET, DELAYED RELEASE ORAL at 20:30

## 2023-05-07 RX ADMIN — BICTEGRAVIR SODIUM, EMTRICITABINE, AND TENOFOVIR ALAFENAMIDE FUMARATE 1 TABLET: 50; 200; 25 TABLET ORAL at 20:30

## 2023-05-07 RX ADMIN — DIVALPROEX SODIUM 250 MG: 250 TABLET, DELAYED RELEASE ORAL at 08:52

## 2023-05-07 ASSESSMENT — PAIN DESCRIPTION - LOCATION: LOCATION: BACK

## 2023-05-07 ASSESSMENT — PAIN SCALES - WONG BAKER
WONGBAKER_NUMERICALRESPONSE: 0
WONGBAKER_NUMERICALRESPONSE: 0

## 2023-05-07 ASSESSMENT — PAIN DESCRIPTION - DESCRIPTORS: DESCRIPTORS: ACHING;TIGHTNESS

## 2023-05-07 ASSESSMENT — PAIN DESCRIPTION - ORIENTATION: ORIENTATION: LOWER

## 2023-05-07 ASSESSMENT — PAIN SCALES - GENERAL: PAINLEVEL_OUTOF10: 0

## 2023-05-08 VITALS
WEIGHT: 200 LBS | SYSTOLIC BLOOD PRESSURE: 108 MMHG | DIASTOLIC BLOOD PRESSURE: 57 MMHG | OXYGEN SATURATION: 99 % | BODY MASS INDEX: 28 KG/M2 | RESPIRATION RATE: 15 BRPM | HEART RATE: 56 BPM | HEIGHT: 71 IN | TEMPERATURE: 97.8 F

## 2023-05-08 LAB — VALPROATE SERPL-MCNC: 32 MCG/ML (ref 50–100)

## 2023-05-08 PROCEDURE — 6370000000 HC RX 637 (ALT 250 FOR IP): Performed by: NURSE PRACTITIONER

## 2023-05-08 PROCEDURE — 36415 COLL VENOUS BLD VENIPUNCTURE: CPT

## 2023-05-08 PROCEDURE — 80164 ASSAY DIPROPYLACETIC ACD TOT: CPT

## 2023-05-08 RX ORDER — LANOLIN ALCOHOL/MO/W.PET/CERES
3 CREAM (GRAM) TOPICAL NIGHTLY
Qty: 30 TABLET | Refills: 0 | Status: SHIPPED | OUTPATIENT
Start: 2023-05-08 | End: 2023-06-07

## 2023-05-08 RX ORDER — DIVALPROEX SODIUM 250 MG/1
250 TABLET, DELAYED RELEASE ORAL EVERY 12 HOURS SCHEDULED
Qty: 60 TABLET | Refills: 0 | Status: SHIPPED | OUTPATIENT
Start: 2023-05-08 | End: 2023-06-07

## 2023-05-08 RX ORDER — OLANZAPINE 5 MG/1
5 TABLET ORAL NIGHTLY
Qty: 30 TABLET | Refills: 0 | Status: SHIPPED | OUTPATIENT
Start: 2023-05-08 | End: 2023-06-07

## 2023-05-08 RX ADMIN — DIVALPROEX SODIUM 250 MG: 250 TABLET, DELAYED RELEASE ORAL at 08:30

## 2023-05-08 NOTE — PLAN OF CARE
Problem: Pain  Goal: Verbalizes/displays adequate comfort level or baseline comfort level  5/7/2023 2152 by Van Oakley RN  Outcome: Not Progressing  5/7/2023 1135 by Josue Kerr RN  Outcome: Progressing     Problem: Depression  Goal: Will be euthymic at discharge  Description: INTERVENTIONS:  1. Administer medication as ordered  2. Provide emotional support via 1:1 interaction with staff  3. Encourage involvement in milieu/groups/activities  4. Monitor for social isolation  5/7/2023 2152 by Van Oakley RN  Outcome: Not Progressing  5/7/2023 1135 by Josue Kerr RN  Outcome: Progressing     Problem: Zari  Goal: Will exhibit normal sleep and speech and no impulsivity  Description: INTERVENTIONS:  1. Administer medication as ordered  2. Set limits on impulsive behavior  3. Make attempts to decrease external stimuli as possible  5/7/2023 2152 by Van Oakley RN  Outcome: Not Progressing  5/7/2023 1135 by Josue Kerr RN  Outcome: Progressing     Problem: Psychosis  Goal: Will report no hallucinations or delusions  Description: INTERVENTIONS:  1. Administer medication as  ordered  2. Assist with reality testing to support increasing orientation  3. Assess if patient's hallucinations or delusions are encouraging self harm or harm to others and intervene as appropriate  5/7/2023 2152 by Van Oakley RN  Outcome: Not Progressing  5/7/2023 1135 by Josue Kerr RN  Outcome: Progressing     Problem: Behavior  Goal: Pt/Family maintain appropriate behavior and adhere to behavioral management agreement, if implemented  Description: INTERVENTIONS:  1. Assess patient/family's coping skills and  non-compliant behavior (including use of illegal substances)  2. Notify security of behavior or suspected illegal substances which indicate the need for search of the family and/or belongings  3. Encourage verbalization of thoughts and concerns in a socially appropriate manner  4.  Utilize positive,

## 2023-05-08 NOTE — PROGRESS NOTES
[facility]  Discharge Note    Pt discharged with followings belongings:   Dental Appliances: None  Vision - Corrective Lenses: None  Hearing Aid: None  Jewelry: None  Body Piercings Removed: N/A  Clothing:  (to be sent with transport staff)   Valuables sent home withyes or returned to patient. Patient educated on aftercare instructions: yes  Information faxed to n/a by n/a  at 1:38 PM .Patient verbalize understanding of AVS:  yes. Status EXAM upon discharge:  Mental Status and Behavioral Exam  Normal: Yes  Level of Assistance: Independent/Self  Facial Expression: Flat  Affect: Appropriate  Level of Consciousness: Alert  Frequency of Checks: 4 times per hour, close  Mood:Normal: Yes  Mood: Depressed  Motor Activity:Normal: Yes  Motor Activity: Decreased  Eye Contact: Good  Observed Behavior: Cooperative  Sexual Misconduct History: Current - no  Preception: [place] to person, [place] to time, [place] to place, [place] to situation  Attention:Normal: Yes  Attention: Distractible  Thought Processes: Circumstantial  Thought Content:Normal: Yes  Thought Content: Preoccupations  Depression Symptoms: No problems reported or observed. Anxiety Symptoms: No problems reported or observed. Zari Symptoms: No problems reported or observed.   Hallucinations: None  Delusions: No  Delusions: Paranoid  Memory:Normal: Yes  Memory: Poor recent  Insight and Judgment: Yes  Insight and Judgment: Poor judgment, Poor insight    Tobacco Screening:  Practical Counseling, on admission, nuria X, if applicable and completed (first 3 are required if patient doesn't refuse):            ( ) Recognizing danger situations (included triggers and roadblocks)                    ( ) Coping skills (new ways to manage stress,relaxation techniques, changing routine, distraction)                                                           ( ) Basic information about quitting (benefits of quitting, techniques in how to quit, available

## 2023-05-08 NOTE — CARE COORDINATION
Pt was seen during treatment team. Pt is calm and cooperative, shares good eye contact. Pt thoughts appear organized, pt shares good eye contact. PT states that he plans to return home where he lives alone, that he will use the bus to transport him today. PT agreed to sign NATASHA for his mother Timm Prader. Pt denies SI/HI/AVH. JEAN-CLAUDE contacted pt mother Timm Prader [phone] (NATASHA signed) to gain collateral. She states that she has no concerns for pt. She states that she has talked to pt and he sounds wonderful. She denies any concerns if pt were to discharge today. She denies pt access to weapons. She confirmed that pt lives alone and can utilize the bus for transportation today. Sw provided hospital excuse for pt. JEAN-CLAUDE contacted [facility] [phone] to schedule appointments for pt. She states that pt is discharged from their services for 60 days due to violating attendance policy. She states that pt is able to call after 5/30 to schedule an appointment. Jean-Claude met with pt to discuss outpatient providers. Pt states that he does not want referred to any other mental health agency, that he will wait to schedule again at [facility]. Pt states that he is agreeable to bridge appointment with the [facility]. JEAN-CLAUDE contacted [address] .  Pt has appointment 5/17 at 10:15am.     In order to ensure appropriate transition and discharge planning is in place, the following documents have been transmitted to [facility], as the new outpatient provider:    The d/c diagnosis was transmitted to the next care provider  The reason for hospitalization was transmitted to the next care provider  The d/c medications (dosage and indication) were transmitted to the next care provider   The continuing care plan was transmitted to the next care provider

## 2023-05-08 NOTE — PROGRESS NOTES
CLINICAL PHARMACY NOTE: MEDS TO BEDS    Total # of Prescriptions Filled: 3   The following medications were delivered to the patient:  Benztropine 2 mg  Divalproex  mg  Olanzapine 5 mg    Additional Documentation:     Delivered meds to Marguerite Dumont on unit

## 2023-05-08 NOTE — DISCHARGE SUMMARY
DISCHARGE SUMMARY      Patient ID:  Anthony Vasquez  [id number]  55 y.o.  1990    Admit date: 5/5/2023    Discharge date and time: 5/8/2023    Admitting Physician: Yoana Rivera MD     Discharge Physician: Dr Yanelis Rodriguez MD    Discharge Diagnoses:   Patient Active Problem List   Diagnosis    Depression, major, single episode    HIV (human immunodeficiency virus infection) ([address]    Erectile dysfunction    Attention deficit hyperactivity disorder (ADHD), predominantly inattentive type    Gastroesophageal reflux disease    Tinea pedis of both feet    Anxiety    Arthritis    Learning disability    ACL (anterior cruciate ligament) tear    Hand pain, left    Pseudofolliculitis barbae    S/P ACL repair    Folliculitis    Balanitis    Acute pain of right knee    Substance induced mood disorder ([address]    Polysubstance abuse ([facility]       Admission Condition: poor    Discharged Condition: stable    Admission Circumstance:  presented to the ED reporting that her spores are leaking out and spores are coming out of his ear he said things were coming out of his head that were spurting and are painful and it is causing his all body to hurt      PAST MEDICAL/PSYCHIATRIC HISTORY:   Past Medical History:   Diagnosis Date    Acid reflux disease     ACL (anterior cruciate ligament) tear     bilateral    Bilateral knee pain     Chronic back pain     Depression     HIV (human immunodeficiency virus infection) ([address]     Severe episode of recurrent major depressive disorder, with psychotic features ([address] 12/25/2019    Substance abuse ([facility]     marijuana approx.  twice/week    Use of cane as ambulatory aid        FAMILY/SOCIAL HISTORY:  Family History   Problem Relation Age of Onset    No Known Problems Mother     No Known Problems Father      Social History     Socioeconomic History    Marital status: Single     Spouse name: Not on file    Number of children: Not on file    Years of education: Not on file    Highest education level: Not on

## 2023-05-08 NOTE — PLAN OF CARE
Problem: Pain  Goal: Verbalizes/displays adequate comfort level or baseline comfort level  5/8/2023 1023 by Bert Woodard RN  Outcome: Completed  5/7/2023 2152 by Rosie Orozco RN  Outcome: Not Progressing     Problem: Risk for Elopement  Goal: Patient will not exit the unit/facility without proper excort  5/8/2023 1023 by Bert Woodard RN  Outcome: Completed  5/7/2023 2152 by Rosie Orozco RN  Outcome: Progressing     Problem: Depression  Goal: Will be euthymic at discharge  Description: INTERVENTIONS:  1. Administer medication as ordered  2. Provide emotional support via 1:1 interaction with staff  3. Encourage involvement in milieu/groups/activities  4. Monitor for social isolation  5/8/2023 1023 by Bert Woodard RN  Outcome: Completed  5/7/2023 2152 by Rosie Orozco RN  Outcome: Not Progressing     Problem: Zari  Goal: Will exhibit normal sleep and speech and no impulsivity  Description: INTERVENTIONS:  1. Administer medication as ordered  2. Set limits on impulsive behavior  3. Make attempts to decrease external stimuli as possible  5/8/2023 1023 by Bert Woodard RN  Outcome: Completed  5/7/2023 2152 by Rosie Orozco RN  Outcome: Not Progressing     Problem: Psychosis  Goal: Will report no hallucinations or delusions  Description: INTERVENTIONS:  1. Administer medication as  ordered  2. Assist with reality testing to support increasing orientation  3. Assess if patient's hallucinations or delusions are encouraging self harm or harm to others and intervene as appropriate  5/8/2023 1023 by Bert Woodard RN  Outcome: Completed  5/7/2023 2152 by Rosie Orozco RN  Outcome: Not Progressing     Problem: Behavior  Goal: Pt/Family maintain appropriate behavior and adhere to behavioral management agreement, if implemented  Description: INTERVENTIONS:  1. Assess patient/family's coping skills and  non-compliant behavior (including use of illegal substances)  2.  Notify security of

## 2023-05-08 NOTE — PROGRESS NOTES
Patient denies suicidal ideation, denies homicidal ideation, denies hallucinations. Patient is compliant with medications. Patient was encouraged to attend groups. Appetite is good, behavior is in control.

## 2023-05-08 NOTE — PROGRESS NOTES
[facility]  Day 3 Interdisciplinary Treatment Plan NOTE    Review Date & Time: 5/8/2023 0900    Patient was in treatment team    Estimated Length of Stay Update:  3-5 days  Estimated Discharge Date Update: 5/11/2023    EDUCATION:   Learner Progress Toward Treatment Goals: Reviewed group plan and strategies    Method: Small group    Outcome: Verbalized understanding    PATIENT GOALS: medication compliance and group therapy attendance    PLAN/TREATMENT RECOMMENDATIONS UPDATE:medication compliance and group therapy attendance    GOALS UPDATE:   Time frame for Short-Term Goals: 3-5 days      King Fang RN

## 2023-05-09 NOTE — CARE COORDINATION
SW was informed that pt follow up information did not successfully send to [facility]. NAZIA faxed discharge summary.

## 2023-06-05 ENCOUNTER — NURSE TRIAGE (OUTPATIENT)
Dept: OTHER | Facility: CLINIC | Age: 33
End: 2023-06-05

## 2023-06-05 NOTE — TELEPHONE ENCOUNTER
Received call from [facility] at [facility], caller not on line. Complaint: Genital/testicle pain    Practice Name: Unestablished - office not provided to writer. Caller's telephone number verified as [phone]    Unsuccessful attempt to re-connect with caller via phone, left message for return call to office    Reason for Disposition   Message left on unidentified voice mail. Phone number verified.     Protocols used: No Contact or Duplicate Contact Call-ADULT-

## 2023-06-10 ENCOUNTER — HOSPITAL ENCOUNTER (EMERGENCY)
Age: 33
Discharge: HOME | End: 2023-06-10
Attending: EMERGENCY MEDICINE

## 2023-06-10 VITALS
BODY MASS INDEX: 28 KG/M2 | HEIGHT: 71 IN | WEIGHT: 200 LBS | SYSTOLIC BLOOD PRESSURE: 144 MMHG | HEART RATE: 73 BPM | RESPIRATION RATE: 18 BRPM | OXYGEN SATURATION: 99 % | DIASTOLIC BLOOD PRESSURE: 86 MMHG | TEMPERATURE: 97.9 F

## 2023-06-10 DIAGNOSIS — R21 RASH AND OTHER NONSPECIFIC SKIN ERUPTION: Primary | ICD-10-CM

## 2023-06-10 RX ORDER — KETOCONAZOLE 20 MG/ML
SHAMPOO TOPICAL
Qty: 120 ML | Refills: 0 | Status: SHIPPED | OUTPATIENT
Start: 2023-06-10

## 2023-06-10 NOTE — ED PROVIDER NOTES
Cardiovascular:      Rate and Rhythm: Normal rate and regular rhythm. Pulses: Normal pulses. Heart sounds: Normal heart sounds. Pulmonary:      Effort: Pulmonary effort is normal. No respiratory distress. Breath sounds: Normal breath sounds. No stridor. No wheezing or rhonchi. Chest:      Chest wall: No tenderness. Abdominal:      General: Bowel sounds are normal. There is no distension. Palpations: Abdomen is soft. There is no mass. Tenderness: There is no abdominal tenderness. Musculoskeletal:         General: No swelling, tenderness or deformity. Normal range of motion. Cervical back: Normal range of motion. No rigidity or tenderness. Skin:     Capillary Refill: Capillary refill takes less than 2 seconds. Coloration: Skin is not jaundiced or pale. Findings: Lesion present. No erythema. Comments: Eczema like rash   Neurological:      General: No focal deficit present. Mental Status: He is alert and oriented to person, place, and time. Mental status is at baseline. Procedures      MDM   History is obtained from patient    This is a 27-year-old male that presents to the ED with concerns for rash. Here in the ED, he is hemodynamically stable, and in no acute distress. He is calm, alert, oriented. Provides history adequately. He has clear lungs to auscultation, no abnormal heart murmurs are heard. Patient has no abdominal tenderness. The patient does have small patches which may be eczema-like lesions on his neck, head. He does try to point out areas that are bothering him that have no physical findings of patches or rash. They are not present in the mucosal walls. The skin's are nonblanching, nonpurulent, do not appear to be molluscum, or vesicular or inflamed versus infectious.     ED Course as of 06/11/23 0241   Sat Favain 10, 2023   1400 After evaluation by myself and my attending, patient will be given a prescription for fluconazole

## 2023-07-05 ENCOUNTER — NURSE TRIAGE (OUTPATIENT)
Dept: OTHER | Facility: CLINIC | Age: 33
End: 2023-07-05

## 2023-07-05 NOTE — TELEPHONE ENCOUNTER
Location of patient: [address] call from Carlos Ca at [employer]; Patient with [employer] Complaint requesting to establish care with [facility] or elsewhere in [place] area. Has appt 8/30 but requesting to be seen sooner. Subjective: Caller states \"Skin issue, swelling in face\"     Current Symptoms:   Seen at ED several times and clinic  Lesions, pain and itching, swelling on face, groin, pubic area, scalp b/l axilla  Dx with folliculitis at one point. No treatment is helping. Appears infected currently. Waxes and wanes x 7 months    Reports anxiety and depression due to rash and inability to control it. Does not want to be seen in public. Advised to call dermatologist by ED, RN triager provided name, address, and phone number from ED AVS.     Pain Severity: 9/10    Temperature: Denies    What has been tried: Neosporin, hot rags, hydration, acne medications, ketoconazole, antibiotics     Recommended disposition: Go to [facility] Now. As pt is unestablished, recommended UCC. Care advice provided, patient verbalizes understanding; denies any other questions or concerns; instructed to call back for any new or worsening symptoms. Patient/Caller agrees with recommended disposition; writer provided warm transfer to [facility] for appointment scheduling to establish care. Attention Provider: Thank you for allowing me to participate in the care of your patient. The patient was connected to triage in response to information provided to the [facility]. Please do not respond through this encounter as the response is not directed to a shared pool.   Reason for Disposition   Looks like a boil, infected sore, deep ulcer, or other infected rash (spreading redness, pus)    Protocols used: Rash or Redness - Localized-ADULT-[place]

## 2023-08-01 NOTE — PROGRESS NOTES
Sutures removed from left knee s/p  Left knee arthroscopy, ACL reconstruction with quadriceps tendon autograft, partial medial and partial lateral meniscectomies on 9/2/2021    [facility] Valtrex Pregnancy And Lactation Text: this medication is Pregnancy Category B and is considered safe during pregnancy. This medication is not directly found in breast milk but it's metabolite acyclovir is present.

## 2023-09-25 ENCOUNTER — OFFICE VISIT (OUTPATIENT)
Dept: PRIMARY CARE CLINIC | Age: 33
End: 2023-09-25

## 2023-09-25 VITALS
SYSTOLIC BLOOD PRESSURE: 120 MMHG | RESPIRATION RATE: 18 BRPM | HEIGHT: 71 IN | HEART RATE: 78 BPM | TEMPERATURE: 98.6 F | WEIGHT: 203 LBS | DIASTOLIC BLOOD PRESSURE: 70 MMHG | BODY MASS INDEX: 28.42 KG/M2 | OXYGEN SATURATION: 99 %

## 2023-09-25 DIAGNOSIS — B20 HIV INFECTION, UNSPECIFIED SYMPTOM STATUS (HCC): ICD-10-CM

## 2023-09-25 DIAGNOSIS — F19.10 POLYSUBSTANCE ABUSE (HCC): ICD-10-CM

## 2023-09-25 DIAGNOSIS — L73.9 FOLLICULITIS: Primary | ICD-10-CM

## 2023-09-25 RX ORDER — DOXYCYCLINE HYCLATE 100 MG
100 TABLET ORAL DAILY
Qty: 30 TABLET | Refills: 1 | Status: SHIPPED | OUTPATIENT
Start: 2023-09-25 | End: 2023-11-24

## 2023-09-25 SDOH — ECONOMIC STABILITY: INCOME INSECURITY: HOW HARD IS IT FOR YOU TO PAY FOR THE VERY BASICS LIKE FOOD, HOUSING, MEDICAL CARE, AND HEATING?: NOT HARD AT ALL

## 2023-09-25 SDOH — ECONOMIC STABILITY: FOOD INSECURITY: WITHIN THE PAST 12 MONTHS, YOU WORRIED THAT YOUR FOOD WOULD RUN OUT BEFORE YOU GOT MONEY TO BUY MORE.: NEVER TRUE

## 2023-09-25 SDOH — ECONOMIC STABILITY: HOUSING INSECURITY
IN THE LAST 12 MONTHS, WAS THERE A TIME WHEN YOU DID NOT HAVE A STEADY PLACE TO SLEEP OR SLEPT IN A SHELTER (INCLUDING NOW)?: NO

## 2023-09-25 SDOH — ECONOMIC STABILITY: FOOD INSECURITY: WITHIN THE PAST 12 MONTHS, THE FOOD YOU BOUGHT JUST DIDN'T LAST AND YOU DIDN'T HAVE MONEY TO GET MORE.: NEVER TRUE

## 2023-09-25 ASSESSMENT — PATIENT HEALTH QUESTIONNAIRE - PHQ9
9. THOUGHTS THAT YOU WOULD BE BETTER OFF DEAD, OR OF HURTING YOURSELF: 0
SUM OF ALL RESPONSES TO PHQ QUESTIONS 1-9: 0
SUM OF ALL RESPONSES TO PHQ QUESTIONS 1-9: 0
1. LITTLE INTEREST OR PLEASURE IN DOING THINGS: 0
7. TROUBLE CONCENTRATING ON THINGS, SUCH AS READING THE NEWSPAPER OR WATCHING TELEVISION: 0
SUM OF ALL RESPONSES TO PHQ9 QUESTIONS 1 & 2: 0
SUM OF ALL RESPONSES TO PHQ QUESTIONS 1-9: 0
5. POOR APPETITE OR OVEREATING: 0
SUM OF ALL RESPONSES TO PHQ QUESTIONS 1-9: 0
10. IF YOU CHECKED OFF ANY PROBLEMS, HOW DIFFICULT HAVE THESE PROBLEMS MADE IT FOR YOU TO DO YOUR WORK, TAKE CARE OF THINGS AT HOME, OR GET ALONG WITH OTHER PEOPLE: 0
6. FEELING BAD ABOUT YOURSELF - OR THAT YOU ARE A FAILURE OR HAVE LET YOURSELF OR YOUR FAMILY DOWN: 0
8. MOVING OR SPEAKING SO SLOWLY THAT OTHER PEOPLE COULD HAVE NOTICED. OR THE OPPOSITE, BEING SO FIGETY OR RESTLESS THAT YOU HAVE BEEN MOVING AROUND A LOT MORE THAN USUAL: 0
3. TROUBLE FALLING OR STAYING ASLEEP: 0
4. FEELING TIRED OR HAVING LITTLE ENERGY: 0
2. FEELING DOWN, DEPRESSED OR HOPELESS: 0

## 2023-09-25 NOTE — PROGRESS NOTES
Ramirez Solorio is a 28 y.o. male new patient with Hx of HIV, homosexual ,depression here for evaluation of skin lesion for 7to 8 month feels stuff coming out of his pores , has not seen a dermatologist ,state was in ER previously and was given shampoo and also was treated with Keflex     Previous record reviewed     Past Medical History:   Diagnosis Date    Acid reflux disease     ACL (anterior cruciate ligament) tear     bilateral    Bilateral knee pain     Chronic back pain     Depression     HIV (human immunodeficiency virus infection) ([address])     Severe episode of recurrent major depressive disorder, with psychotic features ([address]) 12/25/2019    Substance abuse ([address])     marijuana approx. twice/week    Use of cane as ambulatory aid        Past Surgical History:   Procedure Laterality Date    HERNIA REPAIR Left 2007    inguinal    KNEE SURGERY Left 9/2/2021    LEFT KNEE ARTHROSCOPIC ACL RECONSTRUCTION WITH AUTOGRAFT PARTIAL MEDIAL MENISCECTOMY performed by Halina Krueger MD at [facility] OR       Family History   Problem Relation Age of Onset    No Known Problems Mother     No Known Problems Father              Current Outpatient Medications:     doxycycline hyclate (VIBRA-TABS) 100 MG tablet, Take 1 tablet by mouth daily, Disp: 30 tablet, Rfl: 1    ketoconazole (NIZORAL) 2 % shampoo, Apply topically daily as needed. , Disp: 120 mL, Rfl: 0    BIKTARVY -25 MG TABS per tablet, Take 1 tablet by mouth nightly, Disp: , Rfl:      Allergies:  Other    Social History     Tobacco Use    Smoking status: Never    Smokeless tobacco: Never   Substance Use Topics    Alcohol use: Never        Review of Systems:  Respiratory: negative for cough and hemoptysis  Cardiovascular: negative for chest pain and dyspnea  Gastrointestinal: negative for abdominal pain, diarrhea, nausea and vomiting  Genitourinary:negative for dysuria and hematuria  Derm: as per present illness  Neurological: negative for seizures and tremors  Endocrine:

## 2024-01-17 ENCOUNTER — OFFICE VISIT (OUTPATIENT)
Dept: PAIN MANAGEMENT | Age: 34
End: 2024-01-17
Payer: COMMERCIAL

## 2024-01-17 ENCOUNTER — HOSPITAL ENCOUNTER (EMERGENCY)
Age: 34
Discharge: HOME | End: 2024-01-17
Payer: COMMERCIAL

## 2024-01-17 VITALS
HEART RATE: 103 BPM | DIASTOLIC BLOOD PRESSURE: 80 MMHG | SYSTOLIC BLOOD PRESSURE: 110 MMHG | HEIGHT: 71 IN | OXYGEN SATURATION: 95 % | BODY MASS INDEX: 28.42 KG/M2 | RESPIRATION RATE: 18 BRPM | WEIGHT: 203 LBS

## 2024-01-17 VITALS
OXYGEN SATURATION: 100 % | TEMPERATURE: 98.4 F | HEART RATE: 89 BPM | DIASTOLIC BLOOD PRESSURE: 87 MMHG | SYSTOLIC BLOOD PRESSURE: 126 MMHG | BODY MASS INDEX: 27.89 KG/M2 | RESPIRATION RATE: 20 BRPM | WEIGHT: 200 LBS

## 2024-01-17 DIAGNOSIS — F12.91 HISTORY OF MARIJUANA USE: ICD-10-CM

## 2024-01-17 DIAGNOSIS — L73.8 FOLLICULITIS BARBAE: ICD-10-CM

## 2024-01-17 DIAGNOSIS — R21 RASH AND OTHER NONSPECIFIC SKIN ERUPTION: Primary | ICD-10-CM

## 2024-01-17 DIAGNOSIS — M51.36 DDD (DEGENERATIVE DISC DISEASE), LUMBAR: ICD-10-CM

## 2024-01-17 DIAGNOSIS — M47.817 LUMBOSACRAL SPONDYLOSIS WITHOUT MYELOPATHY: Primary | ICD-10-CM

## 2024-01-17 DIAGNOSIS — B20 HIV INFECTION, UNSPECIFIED SYMPTOM STATUS (HCC): ICD-10-CM

## 2024-01-17 PROCEDURE — G8419 CALC BMI OUT NRM PARAM NOF/U: HCPCS | Performed by: ANESTHESIOLOGY

## 2024-01-17 PROCEDURE — 99213 OFFICE O/P EST LOW 20 MIN: CPT | Performed by: ANESTHESIOLOGY

## 2024-01-17 PROCEDURE — G8427 DOCREV CUR MEDS BY ELIG CLIN: HCPCS | Performed by: ANESTHESIOLOGY

## 2024-01-17 PROCEDURE — 1036F TOBACCO NON-USER: CPT | Performed by: ANESTHESIOLOGY

## 2024-01-17 PROCEDURE — 99211 OFF/OP EST MAY X REQ PHY/QHP: CPT

## 2024-01-17 PROCEDURE — G8484 FLU IMMUNIZE NO ADMIN: HCPCS | Performed by: ANESTHESIOLOGY

## 2024-01-17 PROCEDURE — 99214 OFFICE O/P EST MOD 30 MIN: CPT | Performed by: ANESTHESIOLOGY

## 2024-01-17 RX ORDER — METHYLPREDNISOLONE 4 MG/1
TABLET ORAL
Qty: 1 KIT | Refills: 0 | Status: SHIPPED
Start: 2024-01-17 | End: 2024-01-17

## 2024-01-17 RX ORDER — FLUTICASONE PROPIONATE 50 MCG
SPRAY, SUSPENSION (ML) NASAL
COMMUNITY
Start: 2023-11-24

## 2024-01-17 RX ORDER — CHOLECALCIFEROL (VITAMIN D3) 50 MCG
CAPSULE ORAL
COMMUNITY
Start: 2023-10-11

## 2024-01-17 RX ORDER — KETOCONAZOLE 20 MG/ML
SHAMPOO TOPICAL
Qty: 120 ML | Refills: 0 | Status: SHIPPED | OUTPATIENT
Start: 2024-01-17

## 2024-01-17 RX ORDER — PREDNISONE 20 MG/1
20 TABLET ORAL 2 TIMES DAILY
Qty: 10 TABLET | Refills: 0 | Status: SHIPPED | OUTPATIENT
Start: 2024-01-17 | End: 2024-01-22

## 2024-01-17 RX ORDER — LORATADINE 10 MG/1
TABLET ORAL
COMMUNITY
Start: 2023-11-24

## 2024-01-17 RX ORDER — KETOCONAZOLE 20 MG/G
CREAM TOPICAL
Qty: 60 G | Refills: 0 | Status: SHIPPED | OUTPATIENT
Start: 2024-01-17

## 2024-01-17 RX ORDER — CEPHALEXIN 500 MG/1
500 CAPSULE ORAL 4 TIMES DAILY
Qty: 28 CAPSULE | Refills: 0 | Status: SHIPPED | OUTPATIENT
Start: 2024-01-17 | End: 2024-01-24

## 2024-01-17 NOTE — PROGRESS NOTES
Jatinder Antoine presents to the [facility] on 1/17/2024. Jatinder is complaining of pain low back. The pain is persistent. The pain is described as aching and throbbing. Pain is rated on his best day at a 2, on his worst day at a 10, and on average at a 2 on the VAS scale. He took his last dose of  none  N/A.    Any procedures since your last visit: No    He is not on NSAIDS and  is not on anticoagulation medications to include none      Pacemaker or defibrillator: No  Medication Contract and Consent for Opioid Use Documents Filed        No documents found                       There were no vitals taken for this visit.     No LMP for male patient.

## 2024-01-17 NOTE — DISCHARGE INSTRUCTIONS
Ketoconazole shampoo once daily.  Use ketoconazole cream twice daily.  Keflex 4 times daily for 7 days, prednisone twice daily for 5 days.  Please follow-up with dermatology.

## 2024-01-17 NOTE — ED PROVIDER NOTES
exam he is insistent that he has white things coming out of his skin, he does not appear to be hallucinating.  I do not appreciate any white substance is coming from his skin.  He does have some folliculitis Barbae noted to his face.  He does have some dry excoriated skin to his scalp.  Advised patient that he will start Nizoral cream to his scalp as well as his Drost shampoo.  Advised that he will start Keflex for the folliculitis.  Advised to avoid shaving.  Advise follow-up with dermatology.  Discussed conditions requiring emergent reevaluation in the ER.    Plan of Care/Counseling:  FRAN Arroyo - CNP reviewed today's visit with the patient in addition to providing specific details for the plan of care and counseling regarding the diagnosis and prognosis.  Questions are answered at this time and are agreeable with the plan.  Assessment      1. Rash and other nonspecific skin eruption    2. Folliculitis barbae      Plan   Discharged home.  Patient condition is stable    Yaron Colbert MD  [address] [facility] [address]  [phone]    Schedule an appointment as soon as possible for a visit in 1 week      Cristhian Chatterjee MD PhD  [address] [facility] [facility] [address]  [phone]    Schedule an appointment as soon as possible for a visit          New Medications     Discharge Medication List as of 1/17/2024  1:35 PM        START taking these medications    Details   predniSONE (DELTASONE) 20 MG tablet Take 1 tablet by mouth 2 times daily for 5 days, Disp-10 tablet, R-0Normal      !! ketoconazole (NIZORAL) 2 % shampoo Apply topically daily as needed., Disp-120 mL, R-0, Normal      cephALEXin (KEFLEX) 500 MG capsule Take 1 capsule by mouth 4 times daily for 7 days, Disp-28 capsule, R-0Normal      ketoconazole (NIZORAL) 2 % cream Apply topically daily., Disp-60 g, R-0, Normal       !! - Potential duplicate medications found. Please discuss with provider.        Electronically signed by

## 2024-01-17 NOTE — PROGRESS NOTES
Pain Management  Dept: [phone]          Consult Note      Patient:  Jatinder Antoine,  1990    Date of Service:  24       CC:  Chief Complaint   Patient presents with    Back Pain       Patient presents with complaints of low back pain    HISTORY OF PRESENT ILLNESS:      Mr. Jatinder Antoine is a 33 y.o. male presented today to [facility] for evaluation of  chronic low back pain for over 2 yrs.    Pain in the lumbar area - denies significant LE radicular pain.     He noticed intermittent tingling of the LE.    Pain is constant and is described as aching and stabbing.     He  does not have weakness of the both lower extremities     Patient does not have bladder or bowel dysfunction.    Alleviating factors include: rest.  Aggravating factors include: movement, bending, lifting.     Pain causes functional limitations/ limits Adl's : Yes    Nursing notes and details of the pain history reviewed. Please see intake notes for details.    NOTE:  H/o HIV- on treatment..  H/o anxiety/ ADD- follows with psych.    Previous treatments:   Physical Therapy : no     Medications: - NSAID's : yes     Spine Surgeries: no    He has not been on anticoagulation medications.    H/O Smoking: no  H/O alcohol abuse : no  H/O Illicit drug use : H/o THC use +    Employment: unemployed    Imaging:   Xray LS spine: 2021:  FINDINGS:   No fracture, dislocation, spondylolysis or spondylolisthesis.  Disc spaces   are preserved.  There are 2 fenestrated metallic structures seen at the level   of the pelvis which could be outside the patient or possibly within the GI   tract.  Clinical correlation is recommended.           Impression   Unremarkable L-spine.  Fenestrated metallic structures in the pelvis which   could be outside the patient or possibly within the GI tract.           Past Medical History:   Diagnosis Date    Acid reflux disease     ACL (anterior cruciate ligament) tear     bilateral

## 2024-02-09 ENCOUNTER — OFFICE VISIT (OUTPATIENT)
Dept: PRIMARY CARE CLINIC | Age: 34
End: 2024-02-09
Payer: COMMERCIAL

## 2024-02-09 VITALS
SYSTOLIC BLOOD PRESSURE: 120 MMHG | TEMPERATURE: 98 F | HEART RATE: 78 BPM | WEIGHT: 210 LBS | OXYGEN SATURATION: 97 % | DIASTOLIC BLOOD PRESSURE: 70 MMHG | BODY MASS INDEX: 29.4 KG/M2 | HEIGHT: 71 IN | RESPIRATION RATE: 16 BRPM

## 2024-02-09 DIAGNOSIS — N50.89 TESTICULAR MASS: ICD-10-CM

## 2024-02-09 DIAGNOSIS — L73.9 FOLLICULITIS: ICD-10-CM

## 2024-02-09 DIAGNOSIS — N50.89 TESTICULAR MASS: Primary | ICD-10-CM

## 2024-02-09 PROCEDURE — 99214 OFFICE O/P EST MOD 30 MIN: CPT | Performed by: INTERNAL MEDICINE

## 2024-02-09 PROCEDURE — G8427 DOCREV CUR MEDS BY ELIG CLIN: HCPCS | Performed by: INTERNAL MEDICINE

## 2024-02-09 PROCEDURE — G8484 FLU IMMUNIZE NO ADMIN: HCPCS | Performed by: INTERNAL MEDICINE

## 2024-02-09 PROCEDURE — 1036F TOBACCO NON-USER: CPT | Performed by: INTERNAL MEDICINE

## 2024-02-09 PROCEDURE — G8419 CALC BMI OUT NRM PARAM NOF/U: HCPCS | Performed by: INTERNAL MEDICINE

## 2024-02-09 RX ORDER — DOXYCYCLINE HYCLATE 100 MG
100 TABLET ORAL DAILY
Qty: 30 TABLET | Refills: 0 | Status: SHIPPED | OUTPATIENT
Start: 2024-02-09 | End: 2024-03-10

## 2024-02-09 RX ORDER — KETOCONAZOLE 20 MG/G
CREAM TOPICAL
Qty: 60 G | Refills: 0 | Status: CANCELLED | OUTPATIENT
Start: 2024-02-09

## 2024-02-09 ASSESSMENT — ENCOUNTER SYMPTOMS
COUGH: 0
ABDOMINAL PAIN: 0
DIARRHEA: 0
SHORTNESS OF BREATH: 0
VOMITING: 0
NAUSEA: 0

## 2024-02-09 ASSESSMENT — PATIENT HEALTH QUESTIONNAIRE - PHQ9
SUM OF ALL RESPONSES TO PHQ QUESTIONS 1-9: 0
7. TROUBLE CONCENTRATING ON THINGS, SUCH AS READING THE NEWSPAPER OR WATCHING TELEVISION: 0
1. LITTLE INTEREST OR PLEASURE IN DOING THINGS: 0
9. THOUGHTS THAT YOU WOULD BE BETTER OFF DEAD, OR OF HURTING YOURSELF: 0
SUM OF ALL RESPONSES TO PHQ QUESTIONS 1-9: 0
4. FEELING TIRED OR HAVING LITTLE ENERGY: 0
SUM OF ALL RESPONSES TO PHQ9 QUESTIONS 1 & 2: 0
10. IF YOU CHECKED OFF ANY PROBLEMS, HOW DIFFICULT HAVE THESE PROBLEMS MADE IT FOR YOU TO DO YOUR WORK, TAKE CARE OF THINGS AT HOME, OR GET ALONG WITH OTHER PEOPLE: 0
3. TROUBLE FALLING OR STAYING ASLEEP: 0
8. MOVING OR SPEAKING SO SLOWLY THAT OTHER PEOPLE COULD HAVE NOTICED. OR THE OPPOSITE, BEING SO FIGETY OR RESTLESS THAT YOU HAVE BEEN MOVING AROUND A LOT MORE THAN USUAL: 0
5. POOR APPETITE OR OVEREATING: 0
2. FEELING DOWN, DEPRESSED OR HOPELESS: 0
6. FEELING BAD ABOUT YOURSELF - OR THAT YOU ARE A FAILURE OR HAVE LET YOURSELF OR YOUR FAMILY DOWN: 0

## 2024-02-09 NOTE — PROGRESS NOTES
SUBJECTIVE  Jatinder Antoine is a 33 y.o. male established was seen In the office  for evaluation.    HPI/Chief C/O:  Chief Complaint   Patient presents with    Skin Problem     Has issues with his skin      Allergies   Allergen Reactions    Other      Environmental allergies -- hair, grass, carpet - per pt        ROS:  Review of Systems   Respiratory:  Negative for cough and shortness of breath.         Negative for Hemoptysis   Cardiovascular:  Negative for chest pain.   Gastrointestinal:  Negative for abdominal pain, diarrhea, nausea and vomiting.   Endocrine: Negative for polydipsia, polyphagia and polyuria.   Genitourinary:  Negative for dysuria and hematuria.   Skin:  Negative for rash.   Neurological:  Negative for tremors and seizures.        Past Medical/Surgical Hx;  Reviewed with patient      Diagnosis Date    Acid reflux disease     ACL (anterior cruciate ligament) tear     bilateral    Bilateral knee pain     Chronic back pain     Depression     HIV (human immunodeficiency virus infection) ([facility])     Severe episode of recurrent major depressive disorder, with psychotic features ([facility]) 12/25/2019    Substance abuse ([facility])     marijuana approx. twice/week    Use of cane as ambulatory aid      Past Surgical History:   Procedure Laterality Date    HERNIA REPAIR Left 2007    inguinal    KNEE SURGERY Left 9/2/2021    LEFT KNEE ARTHROSCOPIC ACL RECONSTRUCTION WITH AUTOGRAFT PARTIAL MEDIAL MENISCECTOMY performed by Jg Srinivasan MD at [facility] OR       Past Family Hx:  Reviewed with patient      Problem Relation Age of Onset    No Known Problems Mother     No Known Problems Father        Social Hx:  Reviewed with patient  Social History     Tobacco Use    Smoking status: Never    Smokeless tobacco: Never   Substance Use Topics    Alcohol use: Never       OBJECTIVE  /70   Pulse 78   Temp 98 °F (36.7 °C)   Resp 16   Ht 1.803 m (5' 11\")   Wt 95.3 kg (210 lb)   SpO2 97%   BMI 29.29 kg/m²     Problem

## 2024-02-12 LAB
HCG QUALITATIVE: NEGATIVE
LACTATE DEHYDROGENASE: 221 U/L (ref 135–225)

## 2024-02-13 LAB — AFP: 1.9 UG/L

## 2024-02-16 ENCOUNTER — HOSPITAL ENCOUNTER (OUTPATIENT)
Dept: ULTRASOUND IMAGING | Age: 34
End: 2024-02-16
Attending: INTERNAL MEDICINE
Payer: COMMERCIAL

## 2024-02-16 DIAGNOSIS — N50.89 TESTICULAR MASS: ICD-10-CM

## 2024-02-16 PROCEDURE — 76870 US EXAM SCROTUM: CPT

## 2024-03-13 DIAGNOSIS — N52.2 DRUG-INDUCED ERECTILE DYSFUNCTION: ICD-10-CM

## 2024-03-13 RX ORDER — SILDENAFIL 100 MG/1
100 TABLET, FILM COATED ORAL DAILY PRN
Qty: 10 TABLET | Refills: 0 | OUTPATIENT
Start: 2024-03-13

## 2024-06-15 ENCOUNTER — APPOINTMENT (OUTPATIENT)
Dept: ULTRASOUND IMAGING | Age: 34
End: 2024-06-15
Payer: COMMERCIAL

## 2024-06-15 ENCOUNTER — HOSPITAL ENCOUNTER (EMERGENCY)
Age: 34
Discharge: HOME | End: 2024-06-15
Attending: EMERGENCY MEDICINE
Payer: COMMERCIAL

## 2024-06-15 VITALS
RESPIRATION RATE: 16 BRPM | HEIGHT: 71 IN | BODY MASS INDEX: 25.9 KG/M2 | OXYGEN SATURATION: 100 % | HEART RATE: 80 BPM | WEIGHT: 185 LBS | DIASTOLIC BLOOD PRESSURE: 85 MMHG | TEMPERATURE: 97.8 F | SYSTOLIC BLOOD PRESSURE: 136 MMHG

## 2024-06-15 DIAGNOSIS — B20 HUMAN IMMUNODEFICIENCY VIRUS (HIV) DISEASE (HCC): ICD-10-CM

## 2024-06-15 DIAGNOSIS — N50.811 PAIN IN RIGHT TESTICLE: Primary | ICD-10-CM

## 2024-06-15 LAB
ANION GAP SERPL CALCULATED.3IONS-SCNC: 8 MMOL/L (ref 7–16)
BASOPHILS # BLD: 0.03 K/UL (ref 0–0.2)
BASOPHILS NFR BLD: 0 % (ref 0–2)
BILIRUB UR QL STRIP: NEGATIVE
BUN SERPL-MCNC: 8 MG/DL (ref 6–20)
CALCIUM SERPL-MCNC: 9.8 MG/DL (ref 8.6–10.2)
CHLORIDE SERPL-SCNC: 102 MMOL/L (ref 98–107)
CLARITY UR: CLEAR
CO2 SERPL-SCNC: 29 MMOL/L (ref 22–29)
COLOR UR: YELLOW
CREAT SERPL-MCNC: 1.1 MG/DL (ref 0.7–1.2)
EOSINOPHIL # BLD: 0.04 K/UL (ref 0.05–0.5)
EOSINOPHILS RELATIVE PERCENT: 1 % (ref 0–6)
ERYTHROCYTE [DISTWIDTH] IN BLOOD BY AUTOMATED COUNT: 13.3 % (ref 11.5–15)
GFR, ESTIMATED: >90 ML/MIN/1.73M2
GLUCOSE SERPL-MCNC: 104 MG/DL (ref 74–99)
GLUCOSE UR STRIP-MCNC: NEGATIVE MG/DL
HCT VFR BLD AUTO: 48.2 % (ref 37–54)
HGB BLD-MCNC: 15.6 G/DL (ref 12.5–16.5)
HGB UR QL STRIP.AUTO: NEGATIVE
IMM GRANULOCYTES # BLD AUTO: <0.03 K/UL (ref 0–0.58)
IMM GRANULOCYTES NFR BLD: 0 % (ref 0–5)
KETONES UR STRIP-MCNC: NEGATIVE MG/DL
LEUKOCYTE ESTERASE UR QL STRIP: NEGATIVE
LYMPHOCYTES NFR BLD: 1.91 K/UL (ref 1.5–4)
LYMPHOCYTES RELATIVE PERCENT: 25 % (ref 20–42)
MCH RBC QN AUTO: 29.7 PG (ref 26–35)
MCHC RBC AUTO-ENTMCNC: 32.4 G/DL (ref 32–34.5)
MCV RBC AUTO: 91.6 FL (ref 80–99.9)
MONOCYTES NFR BLD: 0.66 K/UL (ref 0.1–0.95)
MONOCYTES NFR BLD: 9 % (ref 2–12)
MUCOUS THREADS URNS QL MICRO: PRESENT
NEUTROPHILS NFR BLD: 65 % (ref 43–80)
NEUTS SEG NFR BLD: 4.88 K/UL (ref 1.8–7.3)
NITRITE UR QL STRIP: NEGATIVE
PH UR STRIP: 6 [PH] (ref 5–9)
PLATELET # BLD AUTO: 312 K/UL (ref 130–450)
PMV BLD AUTO: 8.9 FL (ref 7–12)
POTASSIUM SERPL-SCNC: 4.2 MMOL/L (ref 3.5–5)
PROT UR STRIP-MCNC: ABNORMAL MG/DL
RBC # BLD AUTO: 5.26 M/UL (ref 3.8–5.8)
RBC #/AREA URNS HPF: ABNORMAL /HPF
SODIUM SERPL-SCNC: 139 MMOL/L (ref 132–146)
SP GR UR STRIP: >1.03 (ref 1–1.03)
UROBILINOGEN UR STRIP-ACNC: 0.2 EU/DL (ref 0–1)
WBC #/AREA URNS HPF: ABNORMAL /HPF
WBC OTHER # BLD: 7.5 K/UL (ref 4.5–11.5)

## 2024-06-15 PROCEDURE — 6360000002 HC RX W HCPCS

## 2024-06-15 PROCEDURE — 99284 EMERGENCY DEPT VISIT MOD MDM: CPT

## 2024-06-15 PROCEDURE — 76870 US EXAM SCROTUM: CPT

## 2024-06-15 PROCEDURE — 93975 VASCULAR STUDY: CPT

## 2024-06-15 PROCEDURE — 87491 CHLMYD TRACH DNA AMP PROBE: CPT

## 2024-06-15 PROCEDURE — 85025 COMPLETE CBC W/AUTO DIFF WBC: CPT

## 2024-06-15 PROCEDURE — 6370000000 HC RX 637 (ALT 250 FOR IP)

## 2024-06-15 PROCEDURE — 81001 URINALYSIS AUTO W/SCOPE: CPT

## 2024-06-15 PROCEDURE — 80048 BASIC METABOLIC PNL TOTAL CA: CPT

## 2024-06-15 PROCEDURE — 87591 N.GONORRHOEAE DNA AMP PROB: CPT

## 2024-06-15 PROCEDURE — 96372 THER/PROPH/DIAG INJ SC/IM: CPT

## 2024-06-15 PROCEDURE — 2500000003 HC RX 250 WO HCPCS

## 2024-06-15 RX ORDER — CEFTRIAXONE 500 MG/1
500 INJECTION, POWDER, FOR SOLUTION INTRAMUSCULAR; INTRAVENOUS ONCE
Status: DISCONTINUED | OUTPATIENT
Start: 2024-06-15 | End: 2024-06-15

## 2024-06-15 RX ORDER — DOXYCYCLINE HYCLATE 100 MG
100 TABLET ORAL 2 TIMES DAILY
Qty: 14 TABLET | Refills: 0 | Status: SHIPPED | OUTPATIENT
Start: 2024-06-15 | End: 2024-06-22

## 2024-06-15 RX ORDER — HYDROCODONE BITARTRATE AND ACETAMINOPHEN 5; 325 MG/1; MG/1
1 TABLET ORAL EVERY 6 HOURS PRN
Qty: 6 TABLET | Refills: 0 | Status: SHIPPED | OUTPATIENT
Start: 2024-06-15 | End: 2024-06-18

## 2024-06-15 RX ORDER — DOXYCYCLINE HYCLATE 100 MG/1
100 CAPSULE ORAL ONCE
Status: COMPLETED | OUTPATIENT
Start: 2024-06-15 | End: 2024-06-15

## 2024-06-15 RX ORDER — HYDROCODONE BITARTRATE AND ACETAMINOPHEN 5; 325 MG/1; MG/1
1 TABLET ORAL ONCE
Status: COMPLETED | OUTPATIENT
Start: 2024-06-15 | End: 2024-06-15

## 2024-06-15 RX ADMIN — LIDOCAINE HYDROCHLORIDE 1000 MG: 10 INJECTION, SOLUTION EPIDURAL; INFILTRATION; INTRACAUDAL; PERINEURAL at 07:26

## 2024-06-15 RX ADMIN — DOXYCYCLINE HYCLATE 100 MG: 100 CAPSULE ORAL at 07:26

## 2024-06-15 RX ADMIN — HYDROCODONE BITARTRATE AND ACETAMINOPHEN 1 TABLET: 5; 325 TABLET ORAL at 05:23

## 2024-06-15 ASSESSMENT — PAIN SCALES - GENERAL
PAINLEVEL_OUTOF10: 10
PAINLEVEL_OUTOF10: 8
PAINLEVEL_OUTOF10: 10

## 2024-06-15 ASSESSMENT — PAIN DESCRIPTION - LOCATION
LOCATION: GROIN;SCROTUM
LOCATION: GROIN;SCROTUM
LOCATION: GROIN

## 2024-06-15 NOTE — ED PROVIDER NOTES
[facility] EMERGENCY DEPARTMENT  EMERGENCY DEPARTMENT ENCOUNTER        Pt Name: Jatinder Antoine  MRN: [id number]  Birthdate 1990  Date of evaluation: 6/15/2024  Provider: Ronaldo Ramirez II, DO  PCP: Yaron Colbert MD  Note Started: 4:45 AM EDT 6/15/24    CHIEF COMPLAINT       Chief Complaint   Patient presents with    Groin Pain     Pt states he has had this pain for a year and for the past 2 days it has gotten severely worse    Testicle Pain    Back Pain       HISTORY OF PRESENT ILLNESS: 1 or more Elements            Jatinder Antoine is a 33 y.o. male history of HIV, substance abuse, who presents for testicular pain.  Patient had a pain intermittently for the past year, states that he is currently undergoing treatment for HIV infection.  Patient denies any fevers, chills, nausea, vomiting.  Patient's biggest concern is the persistence of his testicular pain, pain is sharp, waxes and wanes, radiates into his right lower back.  Patient states he has intermittent dysuria, no hematuria.     Nursing Notes were all reviewed and agreed with or any disagreements were addressed in the HPI.      REVIEW OF EXTERNAL NOTE :       Records reviewed for medical hx, surgical, hx, and medication lists      Chart Review/External Note Review    Last Echo reviewed by Me:  No results found for: \"LVEF\", \"LVEFMODE\"          Controlled Substance Monitoring:    Acute and Chronic Pain Monitoring:        No data to display                    REVIEW OF SYSTEMS :      Positives and Pertinent negatives as per HPI.     SURGICAL HISTORY     Past Surgical History:   Procedure Laterality Date    HERNIA REPAIR Left 2007    inguinal    KNEE SURGERY Left 9/2/2021    LEFT KNEE ARTHROSCOPIC ACL RECONSTRUCTION WITH AUTOGRAFT PARTIAL MEDIAL MENISCECTOMY performed by Jg Srinivasan MD at [facility] OR       CURRENTMEDICATIONS       Previous Medications    ALLERGY RELIEF 10 MG TABLET        BIKTARVY -25 MG

## 2024-06-15 NOTE — DISCHARGE INSTRUCTIONS
Please follow-up with the urologist.  Please take medication as prescribed please follow-up with your primary care doctor for further evaluation of your symptoms and for ER follow-up.  Please return to the ER for any new or worsening symptoms.

## 2024-06-18 LAB
CHLAMYDIA DNA UR QL NAA+PROBE: NEGATIVE
N GONORRHOEA DNA UR QL NAA+PROBE: NEGATIVE
SPECIMEN DESCRIPTION: NORMAL

## 2024-06-26 ENCOUNTER — TELEPHONE (OUTPATIENT)
Dept: PRIMARY CARE CLINIC | Age: 34
End: 2024-06-26

## 2024-06-28 ENCOUNTER — OFFICE VISIT (OUTPATIENT)
Dept: PRIMARY CARE CLINIC | Age: 34
End: 2024-06-28
Payer: COMMERCIAL

## 2024-06-28 VITALS
RESPIRATION RATE: 16 BRPM | BODY MASS INDEX: 30.24 KG/M2 | HEIGHT: 71 IN | HEART RATE: 62 BPM | OXYGEN SATURATION: 98 % | TEMPERATURE: 97.3 F | SYSTOLIC BLOOD PRESSURE: 110 MMHG | WEIGHT: 216 LBS | DIASTOLIC BLOOD PRESSURE: 78 MMHG

## 2024-06-28 DIAGNOSIS — L73.9 FOLLICULITIS: Primary | ICD-10-CM

## 2024-06-28 PROCEDURE — 1036F TOBACCO NON-USER: CPT | Performed by: INTERNAL MEDICINE

## 2024-06-28 PROCEDURE — G8427 DOCREV CUR MEDS BY ELIG CLIN: HCPCS | Performed by: INTERNAL MEDICINE

## 2024-06-28 PROCEDURE — G8417 CALC BMI ABV UP PARAM F/U: HCPCS | Performed by: INTERNAL MEDICINE

## 2024-06-28 PROCEDURE — 99213 OFFICE O/P EST LOW 20 MIN: CPT | Performed by: INTERNAL MEDICINE

## 2024-06-28 RX ORDER — KETOCONAZOLE 20 MG/G
CREAM TOPICAL
Qty: 60 G | Refills: 5 | Status: SHIPPED | OUTPATIENT
Start: 2024-06-28

## 2024-06-28 RX ORDER — KETOCONAZOLE 20 MG/ML
SHAMPOO TOPICAL
Qty: 120 ML | Refills: 5 | Status: SHIPPED | OUTPATIENT
Start: 2024-06-28

## 2024-06-28 RX ORDER — DOXYCYCLINE HYCLATE 100 MG
100 TABLET ORAL DAILY
Qty: 30 TABLET | Refills: 1 | Status: SHIPPED | OUTPATIENT
Start: 2024-06-28

## 2024-06-28 ASSESSMENT — ENCOUNTER SYMPTOMS
VOMITING: 0
ABDOMINAL PAIN: 0
COUGH: 0
SHORTNESS OF BREATH: 0
NAUSEA: 0
DIARRHEA: 0

## 2024-06-28 NOTE — PROGRESS NOTES
SUBJECTIVE  Jatinder Antoine is a 33 y.o. male established was seen In the office  for evaluation.    HPI/Chief C/O:  Chief Complaint   Patient presents with    Skin Problem     Wart under chin     Pruritis     Itching on face, upper body , legs and arms     Allergies   Allergen Reactions    Other      Environmental allergies -- hair, grass, carpet - per pt        ROS:  Review of Systems   Respiratory:  Negative for cough and shortness of breath.         Negative for Hemoptysis   Cardiovascular:  Negative for chest pain.   Gastrointestinal:  Negative for abdominal pain, diarrhea, nausea and vomiting.   Endocrine: Negative for polydipsia, polyphagia and polyuria.   Genitourinary:  Negative for dysuria and hematuria.   Skin:  Negative for rash.   Neurological:  Negative for tremors and seizures.        Past Medical/Surgical Hx;  Reviewed with patient      Diagnosis Date    Acid reflux disease     ACL (anterior cruciate ligament) tear     bilateral    Bilateral knee pain     Chronic back pain     Depression     HIV (human immunodeficiency virus infection) ([facility])     Severe episode of recurrent major depressive disorder, with psychotic features ([facility]) 12/25/2019    Substance abuse ([facility])     marijuana approx. twice/week    Use of cane as ambulatory aid      Past Surgical History:   Procedure Laterality Date    HERNIA REPAIR Left 2007    inguinal    KNEE SURGERY Left 9/2/2021    LEFT KNEE ARTHROSCOPIC ACL RECONSTRUCTION WITH AUTOGRAFT PARTIAL MEDIAL MENISCECTOMY performed by Jg Srinivasan MD at [facility] OR       Past Family Hx:  Reviewed with patient      Problem Relation Age of Onset    No Known Problems Mother     No Known Problems Father        Social Hx:  Reviewed with patient  Social History     Tobacco Use    Smoking status: Never    Smokeless tobacco: Never   Substance Use Topics    Alcohol use: Never       OBJECTIVE  /78   Pulse 62   Temp 97.3 °F (36.3 °C) (Temporal)   Resp 16   Ht 1.803 m (5' 11\")   Wt

## 2024-07-11 ENCOUNTER — TELEPHONE (OUTPATIENT)
Dept: PRIMARY CARE CLINIC | Age: 34
End: 2024-07-11

## 2024-07-11 DIAGNOSIS — N50.89 TESTICULAR MASS: Primary | ICD-10-CM

## 2024-08-01 ENCOUNTER — TELEPHONE (OUTPATIENT)
Dept: PRIMARY CARE CLINIC | Age: 34
End: 2024-08-01

## 2024-08-02 LAB
MICROORGANISM SPEC CULT: NO GROWTH
NON-GYN CYTOLOGY REPORT: NORMAL
SPECIMEN DESCRIPTION: NORMAL

## 2024-08-05 LAB — NON-GYN CYTOLOGY REPORT: NORMAL

## 2024-08-09 ENCOUNTER — APPOINTMENT (OUTPATIENT)
Dept: CT IMAGING | Age: 34
End: 2024-08-09
Payer: COMMERCIAL

## 2024-08-09 ENCOUNTER — APPOINTMENT (OUTPATIENT)
Dept: ULTRASOUND IMAGING | Age: 34
End: 2024-08-09
Payer: COMMERCIAL

## 2024-08-09 ENCOUNTER — HOSPITAL ENCOUNTER (EMERGENCY)
Age: 34
Discharge: HOME | End: 2024-08-09
Attending: EMERGENCY MEDICINE
Payer: COMMERCIAL

## 2024-08-09 VITALS
OXYGEN SATURATION: 99 % | DIASTOLIC BLOOD PRESSURE: 81 MMHG | BODY MASS INDEX: 21 KG/M2 | WEIGHT: 150 LBS | SYSTOLIC BLOOD PRESSURE: 134 MMHG | RESPIRATION RATE: 18 BRPM | TEMPERATURE: 98 F | HEIGHT: 71 IN | HEART RATE: 54 BPM

## 2024-08-09 DIAGNOSIS — N50.811 PAIN IN RIGHT TESTICLE: Primary | ICD-10-CM

## 2024-08-09 LAB
ANION GAP SERPL CALCULATED.3IONS-SCNC: 8 MMOL/L (ref 7–16)
BASOPHILS # BLD: 0.01 K/UL (ref 0–0.2)
BASOPHILS NFR BLD: 0 % (ref 0–2)
BILIRUB UR QL STRIP: NEGATIVE
BUN SERPL-MCNC: 8 MG/DL (ref 6–20)
CALCIUM SERPL-MCNC: 9.7 MG/DL (ref 8.6–10.2)
CHLORIDE SERPL-SCNC: 98 MMOL/L (ref 98–107)
CLARITY UR: CLEAR
CO2 SERPL-SCNC: 27 MMOL/L (ref 22–29)
COLOR UR: YELLOW
COMMENT: NORMAL
CREAT SERPL-MCNC: 1.1 MG/DL (ref 0.7–1.2)
EOSINOPHIL # BLD: 0.15 K/UL (ref 0.05–0.5)
EOSINOPHILS RELATIVE PERCENT: 2 % (ref 0–6)
ERYTHROCYTE [DISTWIDTH] IN BLOOD BY AUTOMATED COUNT: 13.7 % (ref 11.5–15)
GFR, ESTIMATED: >90 ML/MIN/1.73M2
GLUCOSE SERPL-MCNC: 93 MG/DL (ref 74–99)
GLUCOSE UR STRIP-MCNC: NEGATIVE MG/DL
HCT VFR BLD AUTO: 48 % (ref 37–54)
HGB BLD-MCNC: 15.8 G/DL (ref 12.5–16.5)
HGB UR QL STRIP.AUTO: NEGATIVE
IMM GRANULOCYTES # BLD AUTO: 0.03 K/UL (ref 0–0.58)
IMM GRANULOCYTES NFR BLD: 0 % (ref 0–5)
KETONES UR STRIP-MCNC: NEGATIVE MG/DL
LACTATE BLDV-SCNC: 0.7 MMOL/L (ref 0.5–2.2)
LEUKOCYTE ESTERASE UR QL STRIP: NEGATIVE
LYMPHOCYTES NFR BLD: 2.46 K/UL (ref 1.5–4)
LYMPHOCYTES RELATIVE PERCENT: 28 % (ref 20–42)
MCH RBC QN AUTO: 29.8 PG (ref 26–35)
MCHC RBC AUTO-ENTMCNC: 32.9 G/DL (ref 32–34.5)
MCV RBC AUTO: 90.4 FL (ref 80–99.9)
MONOCYTES NFR BLD: 0.7 K/UL (ref 0.1–0.95)
MONOCYTES NFR BLD: 8 % (ref 2–12)
NEUTROPHILS NFR BLD: 62 % (ref 43–80)
NEUTS SEG NFR BLD: 5.5 K/UL (ref 1.8–7.3)
NITRITE UR QL STRIP: NEGATIVE
PH UR STRIP: 6.5 [PH] (ref 5–9)
PLATELET # BLD AUTO: 326 K/UL (ref 130–450)
PMV BLD AUTO: 9 FL (ref 7–12)
POTASSIUM SERPL-SCNC: 4.6 MMOL/L (ref 3.5–5)
PROT UR STRIP-MCNC: NEGATIVE MG/DL
RBC # BLD AUTO: 5.31 M/UL (ref 3.8–5.8)
SODIUM SERPL-SCNC: 133 MMOL/L (ref 132–146)
SP GR UR STRIP: 1.01 (ref 1–1.03)
UROBILINOGEN UR STRIP-ACNC: 0.2 EU/DL (ref 0–1)
WBC OTHER # BLD: 8.9 K/UL (ref 4.5–11.5)

## 2024-08-09 PROCEDURE — 2580000003 HC RX 258: Performed by: EMERGENCY MEDICINE

## 2024-08-09 PROCEDURE — 80048 BASIC METABOLIC PNL TOTAL CA: CPT

## 2024-08-09 PROCEDURE — 74177 CT ABD & PELVIS W/CONTRAST: CPT

## 2024-08-09 PROCEDURE — 6370000000 HC RX 637 (ALT 250 FOR IP): Performed by: EMERGENCY MEDICINE

## 2024-08-09 PROCEDURE — 83605 ASSAY OF LACTIC ACID: CPT

## 2024-08-09 PROCEDURE — 85025 COMPLETE CBC W/AUTO DIFF WBC: CPT

## 2024-08-09 PROCEDURE — 81003 URINALYSIS AUTO W/O SCOPE: CPT

## 2024-08-09 PROCEDURE — 99285 EMERGENCY DEPT VISIT HI MDM: CPT

## 2024-08-09 PROCEDURE — 76870 US EXAM SCROTUM: CPT

## 2024-08-09 PROCEDURE — 87086 URINE CULTURE/COLONY COUNT: CPT

## 2024-08-09 PROCEDURE — 96376 TX/PRO/DX INJ SAME DRUG ADON: CPT

## 2024-08-09 PROCEDURE — 6360000004 HC RX CONTRAST MEDICATION: Performed by: RADIOLOGY

## 2024-08-09 PROCEDURE — 96374 THER/PROPH/DIAG INJ IV PUSH: CPT

## 2024-08-09 PROCEDURE — 87591 N.GONORRHOEAE DNA AMP PROB: CPT

## 2024-08-09 PROCEDURE — 6360000002 HC RX W HCPCS: Performed by: EMERGENCY MEDICINE

## 2024-08-09 PROCEDURE — 87491 CHLMYD TRACH DNA AMP PROBE: CPT

## 2024-08-09 RX ORDER — MORPHINE SULFATE 4 MG/ML
4 INJECTION, SOLUTION INTRAMUSCULAR; INTRAVENOUS
Status: DISCONTINUED | OUTPATIENT
Start: 2024-08-09 | End: 2024-08-09 | Stop reason: HOSPADM

## 2024-08-09 RX ORDER — OXYCODONE HYDROCHLORIDE AND ACETAMINOPHEN 5; 325 MG/1; MG/1
1 TABLET ORAL EVERY 6 HOURS PRN
Qty: 10 TABLET | Refills: 0 | Status: SHIPPED | OUTPATIENT
Start: 2024-08-09 | End: 2024-08-09

## 2024-08-09 RX ORDER — MORPHINE SULFATE 4 MG/ML
4 INJECTION, SOLUTION INTRAMUSCULAR; INTRAVENOUS ONCE
Status: COMPLETED | OUTPATIENT
Start: 2024-08-09 | End: 2024-08-09

## 2024-08-09 RX ORDER — OXYCODONE HYDROCHLORIDE AND ACETAMINOPHEN 5; 325 MG/1; MG/1
1 TABLET ORAL EVERY 6 HOURS PRN
Qty: 10 TABLET | Refills: 0 | Status: SHIPPED | OUTPATIENT
Start: 2024-08-09 | End: 2024-08-12

## 2024-08-09 RX ORDER — 0.9 % SODIUM CHLORIDE 0.9 %
1000 INTRAVENOUS SOLUTION INTRAVENOUS ONCE
Status: COMPLETED | OUTPATIENT
Start: 2024-08-09 | End: 2024-08-09

## 2024-08-09 RX ORDER — LEVOFLOXACIN 500 MG/1
500 TABLET, FILM COATED ORAL DAILY
Status: DISCONTINUED | OUTPATIENT
Start: 2024-08-09 | End: 2024-08-09 | Stop reason: HOSPADM

## 2024-08-09 RX ADMIN — MORPHINE SULFATE 4 MG: 4 INJECTION, SOLUTION INTRAMUSCULAR; INTRAVENOUS at 11:30

## 2024-08-09 RX ADMIN — LEVOFLOXACIN 500 MG: 500 TABLET, FILM COATED ORAL at 15:32

## 2024-08-09 RX ADMIN — MORPHINE SULFATE 4 MG: 4 INJECTION, SOLUTION INTRAMUSCULAR; INTRAVENOUS at 08:31

## 2024-08-09 RX ADMIN — IOPAMIDOL 75 ML: 755 INJECTION, SOLUTION INTRAVENOUS at 13:52

## 2024-08-09 RX ADMIN — SODIUM CHLORIDE 1000 ML: 9 INJECTION, SOLUTION INTRAVENOUS at 08:35

## 2024-08-09 ASSESSMENT — PAIN - FUNCTIONAL ASSESSMENT
PAIN_FUNCTIONAL_ASSESSMENT: 0-10
PAIN_FUNCTIONAL_ASSESSMENT: PREVENTS OR INTERFERES SOME ACTIVE ACTIVITIES AND ADLS

## 2024-08-09 ASSESSMENT — PAIN DESCRIPTION - LOCATION
LOCATION: SCROTUM
LOCATION: GROIN
LOCATION: SCROTUM
LOCATION: SCROTUM

## 2024-08-09 ASSESSMENT — PAIN SCALES - GENERAL
PAINLEVEL_OUTOF10: 6
PAINLEVEL_OUTOF10: 9
PAINLEVEL_OUTOF10: 10
PAINLEVEL_OUTOF10: 10

## 2024-08-09 ASSESSMENT — PAIN DESCRIPTION - DESCRIPTORS
DESCRIPTORS: STABBING
DESCRIPTORS: BURNING;SQUEEZING;STABBING

## 2024-08-09 ASSESSMENT — PAIN DESCRIPTION - ORIENTATION
ORIENTATION: LEFT;RIGHT
ORIENTATION: RIGHT;LEFT
ORIENTATION: LEFT;RIGHT
ORIENTATION: RIGHT;LEFT

## 2024-08-09 ASSESSMENT — PAIN DESCRIPTION - FREQUENCY: FREQUENCY: CONTINUOUS

## 2024-08-09 ASSESSMENT — LIFESTYLE VARIABLES
HOW OFTEN DO YOU HAVE A DRINK CONTAINING ALCOHOL: NEVER
HOW MANY STANDARD DRINKS CONTAINING ALCOHOL DO YOU HAVE ON A TYPICAL DAY: PATIENT DOES NOT DRINK

## 2024-08-09 ASSESSMENT — PAIN DESCRIPTION - ONSET: ONSET: ON-GOING

## 2024-08-09 NOTE — ED PROVIDER NOTES
HPI:  8/9/24, Time: 9:32 AM EDT         Jatinder Antoine is a 34 y.o. male presenting to the ED for right-sided testicular pain scrotal pain states its 10 out of 10.  Started many months ago.  He recently saw [facility] urologist he thinks it was Dr. Ortiz this was a week ago he was started on antibiotics he was told he needs to have surgery patient is a poor historian cannot elaborate on the visit or treatment plan.  He states that hurts when he urinates.  He did not denies any new trauma or injury.  No fevers or chills reported., beginning several months ago.  The complaint has been persistent, mild in severity, and worsened by bending, twisting.  Worse with urination.    Review of Systems:   A complete review of systems was performed and pertinent positives and negatives are stated within HPI, all other systems reviewed and are negative.    --------------------------------------------- PAST HISTORY ---------------------------------------------  Past Medical History:  has a past medical history of Acid reflux disease, ACL (anterior cruciate ligament) tear, Bilateral knee pain, Chronic back pain, Depression, HIV (human immunodeficiency virus infection) (HCC), Severe episode of recurrent major depressive disorder, with psychotic features ([facility]), Substance abuse ([facility]), and Use of cane as ambulatory aid.    Past Surgical History:  has a past surgical history that includes hernia repair (Left, 2007) and knee surgery (Left, 9/2/2021).    Social History:  reports that he has never smoked. He has never used smokeless tobacco. He reports that he does not currently use drugs after having used the following drugs: Methamphetamines (Crystal Meth) and Marijuana (Weed). He reports that he does not drink alcohol.    Family History: family history includes No Known Problems in his father and mother.     The patient’s home medications have been reviewed.    Allergies: Other    --------------------------------------------------

## 2024-08-10 ENCOUNTER — HOSPITAL ENCOUNTER (EMERGENCY)
Age: 34
Discharge: HOME | End: 2024-08-10
Attending: STUDENT IN AN ORGANIZED HEALTH CARE EDUCATION/TRAINING PROGRAM
Payer: COMMERCIAL

## 2024-08-10 ENCOUNTER — APPOINTMENT (OUTPATIENT)
Dept: ULTRASOUND IMAGING | Age: 34
End: 2024-08-10
Payer: COMMERCIAL

## 2024-08-10 VITALS
OXYGEN SATURATION: 99 % | DIASTOLIC BLOOD PRESSURE: 82 MMHG | HEART RATE: 51 BPM | RESPIRATION RATE: 18 BRPM | SYSTOLIC BLOOD PRESSURE: 131 MMHG

## 2024-08-10 DIAGNOSIS — N50.811 RIGHT TESTICULAR PAIN: Primary | ICD-10-CM

## 2024-08-10 LAB
ALBUMIN SERPL-MCNC: 4.1 G/DL (ref 3.5–5.2)
ALP SERPL-CCNC: 74 U/L (ref 40–129)
ALT SERPL-CCNC: 15 U/L (ref 0–40)
ANION GAP SERPL CALCULATED.3IONS-SCNC: 11 MMOL/L (ref 7–16)
AST SERPL-CCNC: 17 U/L (ref 0–39)
BASOPHILS # BLD: 0.02 K/UL (ref 0–0.2)
BASOPHILS NFR BLD: 0 % (ref 0–2)
BILIRUB SERPL-MCNC: 0.4 MG/DL (ref 0–1.2)
BILIRUB UR QL STRIP: NEGATIVE
BUN SERPL-MCNC: 9 MG/DL (ref 6–20)
CALCIUM SERPL-MCNC: 9.5 MG/DL (ref 8.6–10.2)
CHLORIDE SERPL-SCNC: 99 MMOL/L (ref 98–107)
CLARITY UR: CLEAR
CO2 SERPL-SCNC: 26 MMOL/L (ref 22–29)
COLOR UR: YELLOW
CREAT SERPL-MCNC: 1.2 MG/DL (ref 0.7–1.2)
EOSINOPHIL # BLD: 0.18 K/UL (ref 0.05–0.5)
EOSINOPHILS RELATIVE PERCENT: 3 % (ref 0–6)
ERYTHROCYTE [DISTWIDTH] IN BLOOD BY AUTOMATED COUNT: 13.5 % (ref 11.5–15)
GFR, ESTIMATED: 83 ML/MIN/1.73M2
GLUCOSE SERPL-MCNC: 91 MG/DL (ref 74–99)
GLUCOSE UR STRIP-MCNC: NEGATIVE MG/DL
HCT VFR BLD AUTO: 46.4 % (ref 37–54)
HGB BLD-MCNC: 15.4 G/DL (ref 12.5–16.5)
HGB UR QL STRIP.AUTO: NEGATIVE
IMM GRANULOCYTES # BLD AUTO: <0.03 K/UL (ref 0–0.58)
IMM GRANULOCYTES NFR BLD: 0 % (ref 0–5)
KETONES UR STRIP-MCNC: NEGATIVE MG/DL
LEUKOCYTE ESTERASE UR QL STRIP: NEGATIVE
LYMPHOCYTES NFR BLD: 2.36 K/UL (ref 1.5–4)
LYMPHOCYTES RELATIVE PERCENT: 36 % (ref 20–42)
MCH RBC QN AUTO: 30.2 PG (ref 26–35)
MCHC RBC AUTO-ENTMCNC: 33.2 G/DL (ref 32–34.5)
MCV RBC AUTO: 91 FL (ref 80–99.9)
MICROORGANISM SPEC CULT: NO GROWTH
MONOCYTES NFR BLD: 0.55 K/UL (ref 0.1–0.95)
MONOCYTES NFR BLD: 8 % (ref 2–12)
NEUTROPHILS NFR BLD: 53 % (ref 43–80)
NEUTS SEG NFR BLD: 3.51 K/UL (ref 1.8–7.3)
NITRITE UR QL STRIP: NEGATIVE
PH UR STRIP: 6.5 [PH] (ref 5–9)
PLATELET # BLD AUTO: 302 K/UL (ref 130–450)
PMV BLD AUTO: 8.8 FL (ref 7–12)
POTASSIUM SERPL-SCNC: 4 MMOL/L (ref 3.5–5)
PROT SERPL-MCNC: 7.8 G/DL (ref 6.4–8.3)
PROT UR STRIP-MCNC: NEGATIVE MG/DL
RBC # BLD AUTO: 5.1 M/UL (ref 3.8–5.8)
RBC #/AREA URNS HPF: NORMAL /HPF
SODIUM SERPL-SCNC: 136 MMOL/L (ref 132–146)
SP GR UR STRIP: 1.02 (ref 1–1.03)
SPECIMEN DESCRIPTION: NORMAL
UROBILINOGEN UR STRIP-ACNC: 1 EU/DL (ref 0–1)
WBC #/AREA URNS HPF: NORMAL /HPF
WBC OTHER # BLD: 6.6 K/UL (ref 4.5–11.5)

## 2024-08-10 PROCEDURE — 81001 URINALYSIS AUTO W/SCOPE: CPT

## 2024-08-10 PROCEDURE — 93975 VASCULAR STUDY: CPT

## 2024-08-10 PROCEDURE — 6360000002 HC RX W HCPCS

## 2024-08-10 PROCEDURE — 85025 COMPLETE CBC W/AUTO DIFF WBC: CPT

## 2024-08-10 PROCEDURE — 87591 N.GONORRHOEAE DNA AMP PROB: CPT

## 2024-08-10 PROCEDURE — 99284 EMERGENCY DEPT VISIT MOD MDM: CPT

## 2024-08-10 PROCEDURE — 80053 COMPREHEN METABOLIC PANEL: CPT

## 2024-08-10 PROCEDURE — 6370000000 HC RX 637 (ALT 250 FOR IP): Performed by: STUDENT IN AN ORGANIZED HEALTH CARE EDUCATION/TRAINING PROGRAM

## 2024-08-10 PROCEDURE — 76870 US EXAM SCROTUM: CPT

## 2024-08-10 PROCEDURE — 87491 CHLMYD TRACH DNA AMP PROBE: CPT

## 2024-08-10 PROCEDURE — 96374 THER/PROPH/DIAG INJ IV PUSH: CPT

## 2024-08-10 RX ORDER — KETOROLAC TROMETHAMINE 15 MG/ML
15 INJECTION, SOLUTION INTRAMUSCULAR; INTRAVENOUS ONCE
Status: COMPLETED | OUTPATIENT
Start: 2024-08-10 | End: 2024-08-10

## 2024-08-10 RX ORDER — 0.9 % SODIUM CHLORIDE 0.9 %
1000 INTRAVENOUS SOLUTION INTRAVENOUS ONCE
Status: DISCONTINUED | OUTPATIENT
Start: 2024-08-10 | End: 2024-08-10 | Stop reason: HOSPADM

## 2024-08-10 RX ORDER — LEVOFLOXACIN 500 MG/1
500 TABLET, FILM COATED ORAL ONCE
Status: COMPLETED | OUTPATIENT
Start: 2024-08-10 | End: 2024-08-10

## 2024-08-10 RX ADMIN — LEVOFLOXACIN 500 MG: 500 TABLET, FILM COATED ORAL at 08:34

## 2024-08-10 RX ADMIN — KETOROLAC TROMETHAMINE 15 MG: 15 INJECTION, SOLUTION INTRAMUSCULAR; INTRAVENOUS at 04:03

## 2024-08-10 NOTE — ED NOTES
Patient is refusing to get an IV after failed attempts.  He did get the Toradol, and labs, however has not gotten the fluids.

## 2024-08-10 NOTE — ED PROVIDER NOTES
Department of Emergency Medicine     Written by: Vera Carter DO  Patient Name: Jatinder Antoine  Admit Date: 8/10/2024  3:09 AM  MRN: [id number]                   : 1990      ------------------------- CC-------------------------  Chief Complaint   Patient presents with    Testicle Pain     Right testicular pain radiating into back     ------------------------- HPI -------------------------    Jatinder Antoine is a 34 y.o. male who presents to the emergency department today complaining of right testicular pain that has progressively gotten worse.  Rates the pain 10/10 which is localized to his groin.  Denies nausea, vomiting, fevers.  Indicates concern for epididymitis.  Denies urinary frequency, urgency, dysuria.  Endorses no trauma to the genital area.  Denies hematuria, hematochezia and melena.    Patient was here last night for similar symptoms.     Nursing notes were all reviewed and agreed with or any disagreements were addressed in the HPI.    REVIEW OF SYSTEMS:    Pertinent positives and negatives mentioned in the HPI/MDM.     ------------------------- PAST HISTORY -------------------------    I personally reviewed the following history:    Past Medical History:  has a past medical history of Acid reflux disease, ACL (anterior cruciate ligament) tear, Bilateral knee pain, Chronic back pain, Depression, HIV (human immunodeficiency virus infection) ([facility]), Severe episode of recurrent major depressive disorder, with psychotic features ([facility]), Substance abuse ([facility]), and Use of cane as ambulatory aid.    Past Surgical History:  has a past surgical history that includes hernia repair (Left, ) and knee surgery (Left, 2021).    Social History:  reports that he has never smoked. He has never used smokeless tobacco. He reports that he does not currently use drugs after having used the following drugs: Methamphetamines (Crystal Meth) and Marijuana (Weed). He reports that he does not drink  8/10/24 0834)       CONSULTS: discussion with bolded \"IP consult\", otherwise consult was likely placed by admitting service  IP CONSULT TO UROLOGY    PROCEDURES: Unless otherwise listed below, none    SOCIAL DETERMINANTS: None    ------------------------- ADDITIONAL PROVIDER NOTES ---------------------------    I have spoken to the patient and/or family regarding results and the proposed plan for disposition.  They are comfortable with management and treatment plans as discussed.    -------------------------------- IMPRESSION AND DISPOSITION ---------------------------------    IMPRESSION  1. Right testicular pain        DISPOSITION  Disposition: Potential discharge home.  Patient condition is N/A.     NOTE: This report was transcribed using voice recognition software. Every effort was made to ensure accuracy; however, inadvertent computerized transcription errors may be present       Vera Carter DO  08/11/24 0639

## 2024-08-10 NOTE — DISCHARGE INSTRUCTIONS
US SCROTUM AND TESTICLES   Final Result   1. There is no evidence of right or left testicular torsion   2. Small bilateral hydroceles   3. Very small left varicocele..   4. Overall there is no interval change in the appearance of the testicles   when compared to patient's prior study of 08/09/2024.   .         US DUP ABD PEL RETRO SCROT COMPLETE   Final Result   1. There is no evidence of right or left testicular torsion   2. Small bilateral hydroceles   3. Very small left varicocele..   4. Overall there is no interval change in the appearance of the testicles   when compared to patient's prior study of 08/09/2024.   .           Call urology today at [phone] to schedule follow-up.

## 2024-09-27 ENCOUNTER — TELEPHONE (OUTPATIENT)
Dept: PRIMARY CARE CLINIC | Age: 34
End: 2024-09-27

## 2025-03-08 RX ORDER — SILDENAFIL 100 MG/1
TABLET, FILM COATED ORAL
Qty: 15 TABLET | Refills: 0 | OUTPATIENT
Start: 2025-03-08

## 2025-06-03 ENCOUNTER — HOSPITAL ENCOUNTER (EMERGENCY)
Age: 35
Discharge: LEFT BEFORE DISCHARGE | End: 2025-06-03
Payer: COMMERCIAL

## 2025-06-03 VITALS
HEART RATE: 107 BPM | DIASTOLIC BLOOD PRESSURE: 64 MMHG | SYSTOLIC BLOOD PRESSURE: 127 MMHG | OXYGEN SATURATION: 98 % | RESPIRATION RATE: 18 BRPM | TEMPERATURE: 97.8 F | BODY MASS INDEX: 20.92 KG/M2 | WEIGHT: 150 LBS

## 2025-06-03 LAB
ALBUMIN SERPL-MCNC: 3.8 G/DL (ref 3.5–5.2)
ALP SERPL-CCNC: 86 U/L (ref 40–129)
ALT SERPL-CCNC: 22 U/L (ref 0–40)
ANION GAP SERPL CALCULATED.3IONS-SCNC: 13 MMOL/L (ref 7–16)
AST SERPL-CCNC: 17 U/L (ref 0–39)
BASOPHILS # BLD: 0.03 K/UL (ref 0–0.2)
BASOPHILS NFR BLD: 0 % (ref 0–2)
BILIRUB SERPL-MCNC: 0.3 MG/DL (ref 0–1.2)
BUN SERPL-MCNC: 9 MG/DL (ref 6–20)
CALCIUM SERPL-MCNC: 9.2 MG/DL (ref 8.6–10.2)
CHLORIDE SERPL-SCNC: 99 MMOL/L (ref 98–107)
CO2 SERPL-SCNC: 26 MMOL/L (ref 22–29)
CREAT SERPL-MCNC: 1.3 MG/DL (ref 0.7–1.2)
EOSINOPHIL # BLD: 0.09 K/UL (ref 0.05–0.5)
EOSINOPHILS RELATIVE PERCENT: 1 % (ref 0–6)
ERYTHROCYTE [DISTWIDTH] IN BLOOD BY AUTOMATED COUNT: 13.6 % (ref 11.5–15)
GFR, ESTIMATED: 75 ML/MIN/1.73M2
GLUCOSE SERPL-MCNC: 83 MG/DL (ref 74–99)
HCT VFR BLD AUTO: 43.8 % (ref 37–54)
HGB BLD-MCNC: 15 G/DL (ref 12.5–16.5)
IMM GRANULOCYTES # BLD AUTO: 0.04 K/UL (ref 0–0.58)
IMM GRANULOCYTES NFR BLD: 0 % (ref 0–5)
LACTATE BLDV-SCNC: 1.7 MMOL/L (ref 0.5–2.2)
LYMPHOCYTES NFR BLD: 1.72 K/UL (ref 1.5–4)
LYMPHOCYTES RELATIVE PERCENT: 15 % (ref 20–42)
MCH RBC QN AUTO: 31.4 PG (ref 26–35)
MCHC RBC AUTO-ENTMCNC: 34.2 G/DL (ref 32–34.5)
MCV RBC AUTO: 91.8 FL (ref 80–99.9)
MONOCYTES NFR BLD: 1.25 K/UL (ref 0.1–0.95)
MONOCYTES NFR BLD: 11 % (ref 2–12)
NEUTROPHILS NFR BLD: 72 % (ref 43–80)
NEUTS SEG NFR BLD: 8.12 K/UL (ref 1.8–7.3)
PLATELET # BLD AUTO: 277 K/UL (ref 130–450)
PMV BLD AUTO: 9 FL (ref 7–12)
POTASSIUM SERPL-SCNC: 3.9 MMOL/L (ref 3.5–5)
PROT SERPL-MCNC: 7.4 G/DL (ref 6.4–8.3)
RBC # BLD AUTO: 4.77 M/UL (ref 3.8–5.8)
SODIUM SERPL-SCNC: 138 MMOL/L (ref 132–146)
WBC OTHER # BLD: 11.3 K/UL (ref 4.5–11.5)

## 2025-06-03 PROCEDURE — 96374 THER/PROPH/DIAG INJ IV PUSH: CPT

## 2025-06-03 PROCEDURE — 80053 COMPREHEN METABOLIC PANEL: CPT

## 2025-06-03 PROCEDURE — 4500000002 HC ER NO CHARGE

## 2025-06-03 PROCEDURE — 36415 COLL VENOUS BLD VENIPUNCTURE: CPT

## 2025-06-03 PROCEDURE — 83605 ASSAY OF LACTIC ACID: CPT

## 2025-06-03 PROCEDURE — 2580000003 HC RX 258: Performed by: NURSE PRACTITIONER

## 2025-06-03 PROCEDURE — 85025 COMPLETE CBC W/AUTO DIFF WBC: CPT

## 2025-06-03 PROCEDURE — 87040 BLOOD CULTURE FOR BACTERIA: CPT

## 2025-06-03 PROCEDURE — 6360000002 HC RX W HCPCS: Performed by: NURSE PRACTITIONER

## 2025-06-03 RX ORDER — KETOROLAC TROMETHAMINE 30 MG/ML
15 INJECTION, SOLUTION INTRAMUSCULAR; INTRAVENOUS ONCE
Status: COMPLETED | OUTPATIENT
Start: 2025-06-03 | End: 2025-06-03

## 2025-06-03 RX ORDER — 0.9 % SODIUM CHLORIDE 0.9 %
1000 INTRAVENOUS SOLUTION INTRAVENOUS ONCE
Status: COMPLETED | OUTPATIENT
Start: 2025-06-03 | End: 2025-06-03

## 2025-06-03 RX ADMIN — KETOROLAC TROMETHAMINE 15 MG: 30 INJECTION, SOLUTION INTRAMUSCULAR at 16:33

## 2025-06-03 RX ADMIN — SODIUM CHLORIDE 1000 ML: 0.9 INJECTION, SOLUTION INTRAVENOUS at 16:33

## 2025-06-03 ASSESSMENT — PAIN - FUNCTIONAL ASSESSMENT: PAIN_FUNCTIONAL_ASSESSMENT: 0-10

## 2025-06-03 ASSESSMENT — PAIN SCALES - GENERAL: PAINLEVEL_OUTOF10: 10

## 2025-06-03 NOTE — ED NOTES
Progress Note  Patient was signed out to me by the preceding provider pending a CT scan.  I did attempt to introduce myself to the patient and complete an independent history and examination however the patient had eloped at some point prior.  I am informed by the medical tech at the pivot desk that he had at some point presented to her stating he was not waiting any longer and refused to speak with anyone regarding the risks and benefits and eloped from the department.  Patient was not seen or evaluated by me.       Nilsa Pineda, FRAN - CNP  06/03/25 [id number]

## 2025-06-03 NOTE — ED PROVIDER NOTES
Independent ERLINDA Visit.  HPI:  6/3/25,   Time: 4:10 PM EDT         Jatinder Antoine is a 34 y.o. male presenting to the ED for a abscess of the buttocks it is causing him to have some pain in the rectum as well as he has not had a bowel movement in 2 days.  He is having no abdominal pain, no fevers but he did feel chilled, he states that it started with a small pimple a day ago and has gotten to be this big and painful over the last day.  Patient states that he has not taken any medication for this.  Patient also states that he is not an IV drug user and denies ever being an IV drug user despite his chart stating that he was a polysubstance abuse drug user, patient is HIV positive he is currently on Biktarvy, and his history he has had a history of folliculitis beginning and a history of skin infections, he also has a history of fungus infections history of warts, at this time patient denies nausea vomiting diarrhea although he states that he has not had a bowel movement in 2 maybe 3 days.  Patient is very anxiousROS:   Pertinent positives and negatives are stated within HPI, all other systems reviewed and are negative.  --------------------------------------------- PAST HISTORY ---------------------------------------------  Past Medical History:  has a past medical history of Acid reflux disease, ACL (anterior cruciate ligament) tear, Bilateral knee pain, Chronic back pain, Depression, HIV (human immunodeficiency virus infection) ([facility]), Severe episode of recurrent major depressive disorder, with psychotic features ([facility]), Substance abuse ([facility]), and Use of cane as ambulatory aid.    Past Surgical History:  has a past surgical history that includes hernia repair (Left, 2007) and knee surgery (Left, 9/2/2021).    Social History:  reports that he has never smoked. He has never used smokeless tobacco. He reports that he does not currently use drugs after having used the following drugs: Methamphetamines (Crystal Meth)

## 2025-06-04 ENCOUNTER — HOSPITAL ENCOUNTER (OUTPATIENT)
Age: 35
Setting detail: OBSERVATION
Discharge: HOME | End: 2025-06-06
Attending: STUDENT IN AN ORGANIZED HEALTH CARE EDUCATION/TRAINING PROGRAM | Admitting: INTERNAL MEDICINE
Payer: COMMERCIAL

## 2025-06-04 ENCOUNTER — ANESTHESIA EVENT (OUTPATIENT)
Dept: OPERATING ROOM | Age: 35
End: 2025-06-04
Payer: COMMERCIAL

## 2025-06-04 ENCOUNTER — APPOINTMENT (OUTPATIENT)
Dept: CT IMAGING | Age: 35
End: 2025-06-04
Attending: STUDENT IN AN ORGANIZED HEALTH CARE EDUCATION/TRAINING PROGRAM
Payer: COMMERCIAL

## 2025-06-04 ENCOUNTER — ANESTHESIA (OUTPATIENT)
Dept: OPERATING ROOM | Age: 35
End: 2025-06-04
Payer: COMMERCIAL

## 2025-06-04 DIAGNOSIS — L02.91 ABSCESS: ICD-10-CM

## 2025-06-04 DIAGNOSIS — K61.0 PERIANAL ABSCESS: Primary | ICD-10-CM

## 2025-06-04 LAB
ALBUMIN SERPL-MCNC: 3.6 G/DL (ref 3.5–5.2)
ALP SERPL-CCNC: 74 U/L (ref 40–129)
ALT SERPL-CCNC: 16 U/L (ref 0–50)
ANION GAP SERPL CALCULATED.3IONS-SCNC: 8 MMOL/L (ref 7–16)
AST SERPL-CCNC: 17 U/L (ref 0–50)
BASOPHILS # BLD: 0.02 K/UL (ref 0–0.2)
BASOPHILS NFR BLD: 0 % (ref 0–2)
BILIRUB SERPL-MCNC: 0.4 MG/DL (ref 0–1.2)
BUN SERPL-MCNC: 8 MG/DL (ref 6–20)
CALCIUM SERPL-MCNC: 8.7 MG/DL (ref 8.6–10)
CHLORIDE SERPL-SCNC: 103 MMOL/L (ref 98–107)
CO2 SERPL-SCNC: 27 MMOL/L (ref 22–29)
CREAT SERPL-MCNC: 1.2 MG/DL (ref 0.7–1.2)
EOSINOPHIL # BLD: 0.08 K/UL (ref 0.05–0.5)
EOSINOPHILS RELATIVE PERCENT: 1 % (ref 0–6)
ERYTHROCYTE [DISTWIDTH] IN BLOOD BY AUTOMATED COUNT: 13.7 % (ref 11.5–15)
GFR, ESTIMATED: 83 ML/MIN/1.73M2
GLUCOSE SERPL-MCNC: 110 MG/DL (ref 74–99)
HCT VFR BLD AUTO: 41.8 % (ref 37–54)
HGB BLD-MCNC: 14 G/DL (ref 12.5–16.5)
IMM GRANULOCYTES # BLD AUTO: 0.05 K/UL (ref 0–0.58)
IMM GRANULOCYTES NFR BLD: 1 % (ref 0–5)
LACTATE BLDV-SCNC: 1 MMOL/L (ref 0.5–2.2)
LYMPHOCYTES NFR BLD: 2.51 K/UL (ref 1.5–4)
LYMPHOCYTES RELATIVE PERCENT: 24 % (ref 20–42)
MCH RBC QN AUTO: 30.4 PG (ref 26–35)
MCHC RBC AUTO-ENTMCNC: 33.5 G/DL (ref 32–34.5)
MCV RBC AUTO: 90.9 FL (ref 80–99.9)
MONOCYTES NFR BLD: 1.14 K/UL (ref 0.1–0.95)
MONOCYTES NFR BLD: 11 % (ref 2–12)
NEUTROPHILS NFR BLD: 64 % (ref 43–80)
NEUTS SEG NFR BLD: 6.8 K/UL (ref 1.8–7.3)
PLATELET # BLD AUTO: 270 K/UL (ref 130–450)
PMV BLD AUTO: 8.7 FL (ref 7–12)
POTASSIUM SERPL-SCNC: 3.9 MMOL/L (ref 3.5–5.1)
PROT SERPL-MCNC: 6.7 G/DL (ref 6.4–8.3)
RBC # BLD AUTO: 4.6 M/UL (ref 3.8–5.8)
SODIUM SERPL-SCNC: 137 MMOL/L (ref 136–145)
WBC OTHER # BLD: 10.6 K/UL (ref 4.5–11.5)

## 2025-06-04 PROCEDURE — 87185 SC STD ENZYME DETCJ PER NZM: CPT

## 2025-06-04 PROCEDURE — 2580000003 HC RX 258

## 2025-06-04 PROCEDURE — 2500000003 HC RX 250 WO HCPCS

## 2025-06-04 PROCEDURE — 2500000003 HC RX 250 WO HCPCS: Performed by: STUDENT IN AN ORGANIZED HEALTH CARE EDUCATION/TRAINING PROGRAM

## 2025-06-04 PROCEDURE — 87070 CULTURE OTHR SPECIMN AEROBIC: CPT

## 2025-06-04 PROCEDURE — 99222 1ST HOSP IP/OBS MODERATE 55: CPT | Performed by: INTERNAL MEDICINE

## 2025-06-04 PROCEDURE — 83605 ASSAY OF LACTIC ACID: CPT

## 2025-06-04 PROCEDURE — 3600000003 HC SURGERY LEVEL 3 BASE: Performed by: SURGERY

## 2025-06-04 PROCEDURE — 3700000001 HC ADD 15 MINUTES (ANESTHESIA): Performed by: SURGERY

## 2025-06-04 PROCEDURE — 2709999900 HC NON-CHARGEABLE SUPPLY: Performed by: SURGERY

## 2025-06-04 PROCEDURE — 96361 HYDRATE IV INFUSION ADD-ON: CPT

## 2025-06-04 PROCEDURE — 87015 SPECIMEN INFECT AGNT CONCNTJ: CPT

## 2025-06-04 PROCEDURE — 86403 PARTICLE AGGLUT ANTBDY SCRN: CPT

## 2025-06-04 PROCEDURE — 3700000000 HC ANESTHESIA ATTENDED CARE: Performed by: SURGERY

## 2025-06-04 PROCEDURE — 80053 COMPREHEN METABOLIC PANEL: CPT

## 2025-06-04 PROCEDURE — 96376 TX/PRO/DX INJ SAME DRUG ADON: CPT

## 2025-06-04 PROCEDURE — 6360000002 HC RX W HCPCS

## 2025-06-04 PROCEDURE — 87102 FUNGUS ISOLATION CULTURE: CPT

## 2025-06-04 PROCEDURE — 99285 EMERGENCY DEPT VISIT HI MDM: CPT

## 2025-06-04 PROCEDURE — 6360000002 HC RX W HCPCS: Performed by: EMERGENCY MEDICINE

## 2025-06-04 PROCEDURE — G0378 HOSPITAL OBSERVATION PER HR: HCPCS

## 2025-06-04 PROCEDURE — 6360000004 HC RX CONTRAST MEDICATION: Performed by: RADIOLOGY

## 2025-06-04 PROCEDURE — 2580000003 HC RX 258: Performed by: STUDENT IN AN ORGANIZED HEALTH CARE EDUCATION/TRAINING PROGRAM

## 2025-06-04 PROCEDURE — 7100000001 HC PACU RECOVERY - ADDTL 15 MIN: Performed by: SURGERY

## 2025-06-04 PROCEDURE — 87536 HIV-1 QUANT&REVRSE TRNSCRPJ: CPT

## 2025-06-04 PROCEDURE — 87075 CULTR BACTERIA EXCEPT BLOOD: CPT

## 2025-06-04 PROCEDURE — 85025 COMPLETE CBC W/AUTO DIFF WBC: CPT

## 2025-06-04 PROCEDURE — 6360000002 HC RX W HCPCS: Performed by: STUDENT IN AN ORGANIZED HEALTH CARE EDUCATION/TRAINING PROGRAM

## 2025-06-04 PROCEDURE — 96365 THER/PROPH/DIAG IV INF INIT: CPT

## 2025-06-04 PROCEDURE — 74177 CT ABD & PELVIS W/CONTRAST: CPT

## 2025-06-04 PROCEDURE — 3600000013 HC SURGERY LEVEL 3 ADDTL 15MIN: Performed by: SURGERY

## 2025-06-04 PROCEDURE — 87205 SMEAR GRAM STAIN: CPT

## 2025-06-04 PROCEDURE — 7100000000 HC PACU RECOVERY - FIRST 15 MIN: Performed by: SURGERY

## 2025-06-04 PROCEDURE — 87077 CULTURE AEROBIC IDENTIFY: CPT

## 2025-06-04 PROCEDURE — 36415 COLL VENOUS BLD VENIPUNCTURE: CPT

## 2025-06-04 PROCEDURE — 87206 SMEAR FLUORESCENT/ACID STAI: CPT

## 2025-06-04 PROCEDURE — 96375 TX/PRO/DX INJ NEW DRUG ADDON: CPT

## 2025-06-04 PROCEDURE — 87040 BLOOD CULTURE FOR BACTERIA: CPT

## 2025-06-04 PROCEDURE — 87116 MYCOBACTERIA CULTURE: CPT

## 2025-06-04 RX ORDER — MAGNESIUM SULFATE IN WATER 40 MG/ML
2000 INJECTION, SOLUTION INTRAVENOUS PRN
Status: DISCONTINUED | OUTPATIENT
Start: 2025-06-04 | End: 2025-06-06 | Stop reason: HOSPADM

## 2025-06-04 RX ORDER — FENTANYL CITRATE 50 UG/ML
50 INJECTION, SOLUTION INTRAMUSCULAR; INTRAVENOUS ONCE
Status: COMPLETED | OUTPATIENT
Start: 2025-06-04 | End: 2025-06-04

## 2025-06-04 RX ORDER — SODIUM CHLORIDE 0.9 % (FLUSH) 0.9 %
5-40 SYRINGE (ML) INJECTION EVERY 12 HOURS SCHEDULED
Status: DISCONTINUED | OUTPATIENT
Start: 2025-06-04 | End: 2025-06-06 | Stop reason: HOSPADM

## 2025-06-04 RX ORDER — SODIUM CHLORIDE 9 MG/ML
INJECTION, SOLUTION INTRAVENOUS PRN
Status: DISCONTINUED | OUTPATIENT
Start: 2025-06-04 | End: 2025-06-06 | Stop reason: HOSPADM

## 2025-06-04 RX ORDER — POTASSIUM CHLORIDE 7.45 MG/ML
10 INJECTION INTRAVENOUS PRN
Status: DISCONTINUED | OUTPATIENT
Start: 2025-06-04 | End: 2025-06-06 | Stop reason: HOSPADM

## 2025-06-04 RX ORDER — ONDANSETRON 2 MG/ML
4 INJECTION INTRAMUSCULAR; INTRAVENOUS ONCE
Status: COMPLETED | OUTPATIENT
Start: 2025-06-04 | End: 2025-06-04

## 2025-06-04 RX ORDER — SODIUM CHLORIDE 0.9 % (FLUSH) 0.9 %
5-40 SYRINGE (ML) INJECTION PRN
Status: DISCONTINUED | OUTPATIENT
Start: 2025-06-04 | End: 2025-06-06 | Stop reason: HOSPADM

## 2025-06-04 RX ORDER — ACETAMINOPHEN 325 MG/1
650 TABLET ORAL EVERY 6 HOURS PRN
Status: DISCONTINUED | OUTPATIENT
Start: 2025-06-04 | End: 2025-06-06 | Stop reason: HOSPADM

## 2025-06-04 RX ORDER — LIDOCAINE HYDROCHLORIDE 20 MG/ML
INJECTION, SOLUTION INTRAVENOUS
Status: DISCONTINUED | OUTPATIENT
Start: 2025-06-04 | End: 2025-06-04 | Stop reason: SDUPTHER

## 2025-06-04 RX ORDER — ONDANSETRON 2 MG/ML
4 INJECTION INTRAMUSCULAR; INTRAVENOUS EVERY 6 HOURS PRN
Status: DISCONTINUED | OUTPATIENT
Start: 2025-06-04 | End: 2025-06-06 | Stop reason: HOSPADM

## 2025-06-04 RX ORDER — FENTANYL CITRATE 50 UG/ML
50 INJECTION, SOLUTION INTRAMUSCULAR; INTRAVENOUS
Status: DISCONTINUED | OUTPATIENT
Start: 2025-06-04 | End: 2025-06-06 | Stop reason: HOSPADM

## 2025-06-04 RX ORDER — SODIUM CHLORIDE 9 MG/ML
INJECTION, SOLUTION INTRAVENOUS PRN
Status: DISCONTINUED | OUTPATIENT
Start: 2025-06-04 | End: 2025-06-04 | Stop reason: HOSPADM

## 2025-06-04 RX ORDER — IOPAMIDOL 755 MG/ML
75 INJECTION, SOLUTION INTRAVASCULAR
Status: COMPLETED | OUTPATIENT
Start: 2025-06-04 | End: 2025-06-04

## 2025-06-04 RX ORDER — ONDANSETRON 2 MG/ML
INJECTION INTRAMUSCULAR; INTRAVENOUS
Status: DISCONTINUED | OUTPATIENT
Start: 2025-06-04 | End: 2025-06-04 | Stop reason: SDUPTHER

## 2025-06-04 RX ORDER — BENZONATATE 100 MG/1
100 CAPSULE ORAL 3 TIMES DAILY PRN
Status: DISCONTINUED | OUTPATIENT
Start: 2025-06-04 | End: 2025-06-06 | Stop reason: HOSPADM

## 2025-06-04 RX ORDER — CALCIUM CARBONATE 500 MG/1
500 TABLET, CHEWABLE ORAL 3 TIMES DAILY PRN
Status: DISCONTINUED | OUTPATIENT
Start: 2025-06-04 | End: 2025-06-06 | Stop reason: HOSPADM

## 2025-06-04 RX ORDER — FENTANYL CITRATE 50 UG/ML
INJECTION, SOLUTION INTRAMUSCULAR; INTRAVENOUS
Status: DISCONTINUED | OUTPATIENT
Start: 2025-06-04 | End: 2025-06-04 | Stop reason: SDUPTHER

## 2025-06-04 RX ORDER — SODIUM CHLORIDE 0.9 % (FLUSH) 0.9 %
5-40 SYRINGE (ML) INJECTION PRN
Status: DISCONTINUED | OUTPATIENT
Start: 2025-06-04 | End: 2025-06-04 | Stop reason: HOSPADM

## 2025-06-04 RX ORDER — NALOXONE HYDROCHLORIDE 0.4 MG/ML
INJECTION, SOLUTION INTRAMUSCULAR; INTRAVENOUS; SUBCUTANEOUS PRN
Status: DISCONTINUED | OUTPATIENT
Start: 2025-06-04 | End: 2025-06-04 | Stop reason: HOSPADM

## 2025-06-04 RX ORDER — POLYETHYLENE GLYCOL 3350 17 G/17G
17 POWDER, FOR SOLUTION ORAL DAILY PRN
Status: DISCONTINUED | OUTPATIENT
Start: 2025-06-04 | End: 2025-06-06 | Stop reason: HOSPADM

## 2025-06-04 RX ORDER — POTASSIUM CHLORIDE 1500 MG/1
40 TABLET, EXTENDED RELEASE ORAL PRN
Status: DISCONTINUED | OUTPATIENT
Start: 2025-06-04 | End: 2025-06-06 | Stop reason: HOSPADM

## 2025-06-04 RX ORDER — ACETAMINOPHEN 650 MG/1
650 SUPPOSITORY RECTAL EVERY 6 HOURS PRN
Status: DISCONTINUED | OUTPATIENT
Start: 2025-06-04 | End: 2025-06-06 | Stop reason: HOSPADM

## 2025-06-04 RX ORDER — HYDRALAZINE HYDROCHLORIDE 20 MG/ML
10 INJECTION INTRAMUSCULAR; INTRAVENOUS EVERY 6 HOURS PRN
Status: DISCONTINUED | OUTPATIENT
Start: 2025-06-04 | End: 2025-06-06 | Stop reason: HOSPADM

## 2025-06-04 RX ORDER — 0.9 % SODIUM CHLORIDE 0.9 %
1000 INTRAVENOUS SOLUTION INTRAVENOUS ONCE
Status: COMPLETED | OUTPATIENT
Start: 2025-06-04 | End: 2025-06-04

## 2025-06-04 RX ORDER — HYDROMORPHONE HYDROCHLORIDE 1 MG/ML
0.25 INJECTION, SOLUTION INTRAMUSCULAR; INTRAVENOUS; SUBCUTANEOUS EVERY 5 MIN PRN
Status: DISCONTINUED | OUTPATIENT
Start: 2025-06-04 | End: 2025-06-04 | Stop reason: HOSPADM

## 2025-06-04 RX ORDER — KETOROLAC TROMETHAMINE 30 MG/ML
30 INJECTION, SOLUTION INTRAMUSCULAR; INTRAVENOUS EVERY 6 HOURS PRN
Status: DISPENSED | OUTPATIENT
Start: 2025-06-04 | End: 2025-06-05

## 2025-06-04 RX ORDER — ONDANSETRON 4 MG/1
4 TABLET, ORALLY DISINTEGRATING ORAL EVERY 8 HOURS PRN
Status: DISCONTINUED | OUTPATIENT
Start: 2025-06-04 | End: 2025-06-06 | Stop reason: HOSPADM

## 2025-06-04 RX ORDER — SODIUM CHLORIDE, SODIUM LACTATE, POTASSIUM CHLORIDE, CALCIUM CHLORIDE 600; 310; 30; 20 MG/100ML; MG/100ML; MG/100ML; MG/100ML
INJECTION, SOLUTION INTRAVENOUS CONTINUOUS
Status: DISCONTINUED | OUTPATIENT
Start: 2025-06-04 | End: 2025-06-06 | Stop reason: HOSPADM

## 2025-06-04 RX ORDER — PROPOFOL 10 MG/ML
INJECTION, EMULSION INTRAVENOUS
Status: DISCONTINUED | OUTPATIENT
Start: 2025-06-04 | End: 2025-06-04 | Stop reason: SDUPTHER

## 2025-06-04 RX ORDER — METRONIDAZOLE 500 MG/100ML
500 INJECTION, SOLUTION INTRAVENOUS ONCE
Status: COMPLETED | OUTPATIENT
Start: 2025-06-04 | End: 2025-06-04

## 2025-06-04 RX ORDER — HYDROMORPHONE HYDROCHLORIDE 1 MG/ML
0.5 INJECTION, SOLUTION INTRAMUSCULAR; INTRAVENOUS; SUBCUTANEOUS EVERY 5 MIN PRN
Status: DISCONTINUED | OUTPATIENT
Start: 2025-06-04 | End: 2025-06-04 | Stop reason: HOSPADM

## 2025-06-04 RX ORDER — GLYCOPYRROLATE 1 MG/5 ML
SYRINGE (ML) INTRAVENOUS
Status: DISCONTINUED | OUTPATIENT
Start: 2025-06-04 | End: 2025-06-04 | Stop reason: SDUPTHER

## 2025-06-04 RX ORDER — DEXAMETHASONE SODIUM PHOSPHATE 10 MG/ML
INJECTION, SOLUTION INTRA-ARTICULAR; INTRALESIONAL; INTRAMUSCULAR; INTRAVENOUS; SOFT TISSUE
Status: DISCONTINUED | OUTPATIENT
Start: 2025-06-04 | End: 2025-06-04 | Stop reason: SDUPTHER

## 2025-06-04 RX ORDER — SODIUM CHLORIDE 0.9 % (FLUSH) 0.9 %
5-40 SYRINGE (ML) INJECTION EVERY 12 HOURS SCHEDULED
Status: DISCONTINUED | OUTPATIENT
Start: 2025-06-04 | End: 2025-06-04 | Stop reason: HOSPADM

## 2025-06-04 RX ORDER — DEXMEDETOMIDINE HYDROCHLORIDE 100 UG/ML
INJECTION, SOLUTION INTRAVENOUS
Status: DISCONTINUED | OUTPATIENT
Start: 2025-06-04 | End: 2025-06-04 | Stop reason: SDUPTHER

## 2025-06-04 RX ORDER — SODIUM CHLORIDE 9 MG/ML
INJECTION, SOLUTION INTRAVENOUS
Status: DISCONTINUED | OUTPATIENT
Start: 2025-06-04 | End: 2025-06-04 | Stop reason: SDUPTHER

## 2025-06-04 RX ORDER — MIDAZOLAM HYDROCHLORIDE 1 MG/ML
INJECTION, SOLUTION INTRAMUSCULAR; INTRAVENOUS
Status: DISCONTINUED | OUTPATIENT
Start: 2025-06-04 | End: 2025-06-04 | Stop reason: SDUPTHER

## 2025-06-04 RX ORDER — PROCHLORPERAZINE EDISYLATE 5 MG/ML
5 INJECTION INTRAMUSCULAR; INTRAVENOUS
Status: DISCONTINUED | OUTPATIENT
Start: 2025-06-04 | End: 2025-06-04 | Stop reason: HOSPADM

## 2025-06-04 RX ADMIN — MIDAZOLAM 2 MG: 1 INJECTION INTRAMUSCULAR; INTRAVENOUS at 17:51

## 2025-06-04 RX ADMIN — PIPERACILLIN AND TAZOBACTAM 3375 MG: 3; .375 INJECTION, POWDER, LYOPHILIZED, FOR SOLUTION INTRAVENOUS at 22:28

## 2025-06-04 RX ADMIN — Medication 30 MG: at 17:53

## 2025-06-04 RX ADMIN — LIDOCAINE HYDROCHLORIDE 100 MG: 20 INJECTION, SOLUTION INTRAVENOUS at 17:53

## 2025-06-04 RX ADMIN — Medication 0.2 MG: at 17:53

## 2025-06-04 RX ADMIN — SODIUM CHLORIDE 1000 ML: 0.9 INJECTION, SOLUTION INTRAVENOUS at 06:16

## 2025-06-04 RX ADMIN — SODIUM CHLORIDE, PRESERVATIVE FREE 10 ML: 5 INJECTION INTRAVENOUS at 22:30

## 2025-06-04 RX ADMIN — METRONIDAZOLE 500 MG: 500 INJECTION, SOLUTION INTRAVENOUS at 10:06

## 2025-06-04 RX ADMIN — SODIUM CHLORIDE, SODIUM LACTATE, POTASSIUM CHLORIDE, AND CALCIUM CHLORIDE: .6; .31; .03; .02 INJECTION, SOLUTION INTRAVENOUS at 22:52

## 2025-06-04 RX ADMIN — PIPERACILLIN AND TAZOBACTAM 3375 MG: 3; .375 INJECTION, POWDER, LYOPHILIZED, FOR SOLUTION INTRAVENOUS at 13:20

## 2025-06-04 RX ADMIN — SODIUM CHLORIDE: 9 INJECTION, SOLUTION INTRAVENOUS at 17:49

## 2025-06-04 RX ADMIN — KETOROLAC TROMETHAMINE 30 MG: 30 INJECTION, SOLUTION INTRAMUSCULAR at 10:31

## 2025-06-04 RX ADMIN — IOPAMIDOL 75 ML: 755 INJECTION, SOLUTION INTRAVENOUS at 07:39

## 2025-06-04 RX ADMIN — FENTANYL CITRATE 100 MCG: 50 INJECTION, SOLUTION INTRAMUSCULAR; INTRAVENOUS at 17:49

## 2025-06-04 RX ADMIN — ONDANSETRON 4 MG: 2 INJECTION, SOLUTION INTRAMUSCULAR; INTRAVENOUS at 06:17

## 2025-06-04 RX ADMIN — FENTANYL CITRATE 50 MCG: 50 INJECTION, SOLUTION INTRAMUSCULAR; INTRAVENOUS at 18:29

## 2025-06-04 RX ADMIN — PROPOFOL 200 MG: 10 INJECTION, EMULSION INTRAVENOUS at 17:53

## 2025-06-04 RX ADMIN — WATER 2000 MG: 1 INJECTION INTRAMUSCULAR; INTRAVENOUS; SUBCUTANEOUS at 10:02

## 2025-06-04 RX ADMIN — ONDANSETRON 4 MG: 2 INJECTION, SOLUTION INTRAMUSCULAR; INTRAVENOUS at 17:55

## 2025-06-04 RX ADMIN — DEXMEDETOMIDINE HCL 8 MCG: 100 INJECTION INTRAVENOUS at 18:17

## 2025-06-04 RX ADMIN — DEXAMETHASONE SODIUM PHOSPHATE 10 MG: 10 INJECTION INTRAMUSCULAR; INTRAVENOUS at 17:55

## 2025-06-04 RX ADMIN — FENTANYL CITRATE 50 MCG: 50 INJECTION INTRAMUSCULAR; INTRAVENOUS at 06:17

## 2025-06-04 RX ADMIN — FENTANYL CITRATE 50 MCG: 50 INJECTION, SOLUTION INTRAMUSCULAR; INTRAVENOUS at 18:22

## 2025-06-04 RX ADMIN — DEXMEDETOMIDINE HCL 8 MCG: 100 INJECTION INTRAVENOUS at 18:15

## 2025-06-04 ASSESSMENT — PAIN SCALES - GENERAL
PAINLEVEL_OUTOF10: 0
PAINLEVEL_OUTOF10: 10
PAINLEVEL_OUTOF10: 8
PAINLEVEL_OUTOF10: 10

## 2025-06-04 ASSESSMENT — PAIN DESCRIPTION - LOCATION
LOCATION: BUTTOCKS
LOCATION: LEG
LOCATION: LEG

## 2025-06-04 ASSESSMENT — PAIN - FUNCTIONAL ASSESSMENT: PAIN_FUNCTIONAL_ASSESSMENT: 0-10

## 2025-06-04 ASSESSMENT — PAIN DESCRIPTION - ORIENTATION
ORIENTATION: LEFT
ORIENTATION: POSTERIOR
ORIENTATION: LEFT

## 2025-06-04 ASSESSMENT — PAIN DESCRIPTION - PAIN TYPE: TYPE: ACUTE PAIN

## 2025-06-04 ASSESSMENT — PAIN DESCRIPTION - DESCRIPTORS: DESCRIPTORS: DISCOMFORT

## 2025-06-04 ASSESSMENT — ENCOUNTER SYMPTOMS: RECTAL PAIN: 1

## 2025-06-04 NOTE — PROGRESS NOTES
Spoke to RN taking care of pt on 54 regarding pt coming to OR. Stated we will put in for transport for him soon, and that we would like pt dressed in gown, with a working IV and consent signed before coming to OR.

## 2025-06-04 NOTE — ANESTHESIA POSTPROCEDURE EVALUATION
Department of Anesthesiology  Postprocedure Note    Patient: Jatinder Antoine  MRN: [id number]  YOB: 1990  Date of evaluation: 6/4/2025    Procedure Summary       Date: 06/04/25 Room / Location: [address]    Anesthesia Start: 1749 Anesthesia Stop: 1837    Procedure: INCISION AND DRAINAGE OF LEFT PERIANAL ABSCESS, PLACEMENT OF DRAIN (Left: Rectum) Diagnosis:       Abscess      (Abscess [L02.91])    Surgeons: Juanito Motta MD Responsible Provider: Victoria Sultana MD    Anesthesia Type: general ASA Status: 3            Anesthesia Type: No value filed.    Annetta Phase I:      Annetta Phase II:      Anesthesia Post Evaluation    Patient location during evaluation: PACU  Patient participation: complete - patient participated  Level of consciousness: awake  Airway patency: patent  Nausea & Vomiting: no nausea and no vomiting  Cardiovascular status: hemodynamically stable  Respiratory status: acceptable  Hydration status: euvolemic  Pain management: adequate    No notable events documented.

## 2025-06-04 NOTE — H&P
Inpatient H&P      PCP:  No primary care provider on file.  Admitting Physician:  Hemalatha Domínguez DO  Consultants:  Surgery, ID  Chief Complaint:    Chief Complaint   Patient presents with    Abscess     Patient states that he has an abscess on his left thigh, complaining of severe pain.  States he noticed it two days ago.       History of Present Illness  Jatinder Antoine is a 34 y.o. male who presents to [facility] ER complaining of abscess.    Jatinder Antoine has a past medical history that includes HIV    Jatinder presents to the ER with abscess.  He states that he noticed swelling and pain about 2 days ago.  He states that this started as a small pimple a day ago and has become larger over the past day.  He does have a history of HIV and takes his prescribed Biktarvy.  General surgery consulted from the ER.  Surgery desires medical admission due to HIV.  Discussed patient's case with ED physician.    ER Course  Upon presentation to the ER, routine labwork was performed which revealed glucose 110.  Imaging results are as outlined below in the Imaging section of this note.     Upon arrival to the ER, patient was 128/67.  The patient received Rocephin, Flagyl, Zofran, IVF in the emergency room and was admitted to [facility].    Last Hospital Admission -   None in EMR    Last Echocardiogram -   None in EMR     ED TRIAGE VITALS  BP: 131/74, Temp: 98.2 °F (36.8 °C), Pulse: 88, Respirations: 16, SpO2: 98 %    Vitals:    06/04/25 0520 06/04/25 0528 06/04/25 1000   BP:  128/67 131/74   Pulse: 85  88   Resp:  18 16   Temp: 98 °F (36.7 °C)  98.2 °F (36.8 °C)   SpO2: 96%  98%   Weight:  83.9 kg (185 lb)    Height:  1.803 m (5' 11\")          Histories  Past Medical History:   Diagnosis Date    Acid reflux disease     ACL (anterior cruciate ligament) tear     bilateral    Bilateral knee pain     Chronic back pain     Depression     HIV (human immunodeficiency virus infection) (HCC)     Severe episode of recurrent  utilized to reduce the radiation dose to as low as reasonably achievable. COMPARISON: 08/09/2024 HISTORY: ORDERING SYSTEM PROVIDED HISTORY: abscess TECHNOLOGIST PROVIDED HISTORY: Reason for exam:->abscess Additional Contrast?->None Decision Support Exception - unselect if not a suspected or confirmed emergency medical condition->Emergency Medical Condition (MA) What reading provider will be dictating this exam?->CRC FINDINGS: Lower Chest: Lung bases are grossly clear. Organs: Liver is homogeneous in appearance with low-attenuation focus identified with surrounding enhancement posteriorly within the right lobe of liver to suggest a hemangioma.  Low-attenuation seen along the ligamentum fissure to suggest an air focal fatty infiltration.  No stones in the gallbladder.  The pancreas is homogeneous.  The spleen is unremarkable.  Both adrenal glands are within normal limits.  Symmetric enhancement of the renal parenchyma.  Cyst identified within the right kidney.  No routine follow-up is suggested.  No stones or distension seen in the renal collecting system. GI/Bowel: The stomach is unremarkable in appearance.  No wall thickening. The small bowel is within normal limits.  The appendix is normal.  Moderate stool burden seen diffusely throughout the colon to suggest clinical presentation of constipation. Pelvis: The bladder is distended.  No significant wall thickening.  The prostate is unremarkable. Peritoneum/Retroperitoneum: Scattered small lymph nodes identified in the retroperitoneum however no bulky adenopathy is identified.  No pelvic adenopathy.  No free fluid or free air.  No abnormal mass or fluid collections identified. Bones/Soft Tissues: Bony structures reveal no evidence of acute or aggressive osseous abnormality.  No ventral abdominal wall mass or defect.  There is evidence of a left perianal abscess measuring 3.2 x 2.4 cm in size with surrounding edema and overlying skin thickening.     1. Left perianal

## 2025-06-04 NOTE — PROGRESS NOTES
Floor Called, nurse to nurse given. Spoke with Lindsay RODRIGUES . Patients test results review, VS reported to receiving nurse. Any and all important information regarding patient disclosed.

## 2025-06-04 NOTE — ANESTHESIA PRE PROCEDURE
Department of Anesthesiology  Preprocedure Note       Name:  Jatinder Antoine   Age:  34 y.o.  :  1990                                          MRN:  [id number]         Date:  2025      Surgeon: Surgeon(s):  Juanito Motta MD    Procedure: Procedure(s):  INCISION AND DRAINAGE OF PERIANAL ABSCESS, POSSIBLE DRAIN (LITHOTOMY)    Medications prior to admission:   Prior to Admission medications    Medication Sig Start Date End Date Taking? Authorizing Provider   BIKTARVY -25 MG TABS per tablet Take 1 tablet by mouth nightly   Yes Provider, MD Santosh   ketoconazole (NIZORAL) 2 % shampoo Apply topically daily as needed.  Patient not taking: Reported on 2025   Yaron Colbert MD   ketoconazole (NIZORAL) 2 % cream Apply topically daily.  Patient not taking: Reported on 2025   Yaron Colbert MD   doxycycline hyclate (VIBRA-TABS) 100 MG tablet Take 1 tablet by mouth daily  Patient not taking: Reported on 2025   Yaron Colbert MD   fluticasone (FLONASE) 50 MCG/ACT nasal spray  23   ProviderSantosh MD   ketoconazole (NIZORAL) 2 % shampoo Apply topically daily as needed.  Patient not taking: Reported on 2025   Kristie Pope, APRN - CNP       Current medications:    Current Facility-Administered Medications   Medication Dose Route Frequency Provider Last Rate Last Admin   • ondansetron (ZOFRAN) injection 4 mg  4 mg IntraVENous Q6H PRN Lucian Davidson DO       • ketorolac (TORADOL) injection 30 mg  30 mg IntraVENous Q6H PRN Lucian Davidson DO   30 mg at 25 1031   • piperacillin-tazobactam (ZOSYN) 3,375 mg in sodium chloride 0.9 % 50 mL IVPB ([id number])  3,375 mg IntraVENous Q8H Gini Cordova MD 12.5 mL/hr at 25 1320 3,375 mg at 25 1320   • benzocaine-menthol (CEPACOL SORE THROAT) lozenge 1 lozenge  1 lozenge Oral Q2H PRN Hemalatha Domínguez, DO       • benzonatate (TESSALON) capsule 100 mg  100 mg Oral TID PRN

## 2025-06-04 NOTE — ED PROVIDER NOTES
AUTOGRAFT PARTIAL MEDIAL MENISCECTOMY performed by Jg Srinivasan MD at [facility] OR       CURRENTMEDICATIONS       Previous Medications    ALLERGY RELIEF 10 MG TABLET        BIKTARVY -25 MG TABS PER TABLET    Take 1 tablet by mouth nightly    D3 SUPER STRENGTH 50 MCG (2000 UT) CAPS CAPSULE        DOXYCYCLINE HYCLATE (VIBRA-TABS) 100 MG TABLET    Take 1 tablet by mouth daily    FLUTICASONE (FLONASE) 50 MCG/ACT NASAL SPRAY        KETOCONAZOLE (NIZORAL) 2 % CREAM    Apply topically daily.    KETOCONAZOLE (NIZORAL) 2 % SHAMPOO    Apply topically daily as needed.    KETOCONAZOLE (NIZORAL) 2 % SHAMPOO    Apply topically daily as needed.       ALLERGIES     Other/food    FAMILYHISTORY       Family History   Problem Relation Age of Onset    No Known Problems Mother     No Known Problems Father         SOCIAL HISTORY       Social History     Tobacco Use    Smoking status: Never    Smokeless tobacco: Never   Vaping Use    Vaping status: Former   Substance Use Topics    Alcohol use: Never    Drug use: Not Currently     Types: Methamphetamines (Crystal Meth), Marijuana (Weed)     Comment: Marijuana twice per month       SCREENINGS        [place] Coma Scale  Eye Opening: Spontaneous  Best Verbal Response: Oriented  Best Motor Response: Obeys commands  [place] Coma Scale Score: 15                CIWA Assessment  BP: 128/67  Pulse: 85           PHYSICAL EXAM  1 or more Elements     ED Triage Vitals   BP Systolic BP Percentile Diastolic BP Percentile Temp Temp src Pulse Respirations SpO2   06/04/25 0528 -- -- 06/04/25 0520 -- 06/04/25 0520 06/04/25 0528 06/04/25 0520   128/67   98 °F (36.7 °C)  85 18 96 %      Height Weight - Scale         06/04/25 0528 06/04/25 0528         1.803 m (5' 11\") 83.9 kg (185 lb)             Physical Exam    Constitutional/General: Alert and oriented x3.  Distress secondary to pain  Head: Normocephalic and atraumatic  Eyes:  EOMI, conjunctiva normal, sclera non icteric  ENT:  Oropharynx clear,  time range)   ketorolac (TORADOL) injection 30 mg (30 mg IntraVENous Given 6/4/25 1031)   fentaNYL (SUBLIMAZE) injection 50 mcg (50 mcg IntraVENous Given 6/4/25 0617)   ondansetron (ZOFRAN) injection 4 mg (4 mg IntraVENous Given 6/4/25 0617)   sodium chloride 0.9 % bolus 1,000 mL (0 mLs IntraVENous Stopped 6/4/25 1006)   iopamidol (ISOVUE-370) 76 % injection 75 mL (75 mLs IntraVENous Given 6/4/25 0739)   cefTRIAXone (ROCEPHIN) 2,000 mg in sterile water 20 mL IV syringe (2,000 mg IntraVENous Given 6/4/25 1002)         Medical Decision Making/Differential Diagnosis:    CC/HPI Summary, Social Determinants of health, Records Reviewed, DDx, testing done/not done, ED Course, Reassessment, disposition considerations/shared decision making with patient, consults, disposition:        The patient is a 34-year-old male presenting emergency department complaining of an abscess.  Patient is hemodynamically stable, nontoxic, but mildly distressed secondary to pain.    Patient does have a history of methamphetamine use and marijuana use in the past which are known social detriment to his health.  Prior records reviewed and patient did follow-up with urology in the office 5/30/2025 due to pain in his testicles.  Records reviewed from this visit.    Differential diagnosis includes but is not limited to abscess versus necrotizing fasciitis versus cellulitis.    CMP does not show any acute electrolyte abnormalities.  LFTs are normal.  Creatinine is normal.  CBC shows no significant leukocytosis or anemia.  CT abdomen pelvis shows a left perineal abscess measuring 3.2 x 2.4 cm in size with surrounding edema and overlying skin thickening.    Patient was treated with IV fluids in addition to IV Zofran,  IV fentanyl, and IV Rocephin and Flagyl as well.    He was feeling better on reassessment.     Consult placed to general surgery.  Dispo per surgery.      CONSULTS: (Who and What was discussed)  IP CONSULT TO GENERAL SURGERY    Spoke with

## 2025-06-04 NOTE — OP NOTE
Operative Note      Patient: Jatinder Antoine  YOB: 1990  MRN: [id number]    Date of Procedure: 6/4/2025    Pre-Op Diagnosis Codes:      * Abscess [L02.91]    Post-Op Diagnosis: Same       Procedure(s):  INCISION AND DRAINAGE OF PERIANAL ABSCESS, POSSIBLE DRAIN (LITHOTOMY)    Surgeon(s):  Juanito Motta MD    Assistant:   Resident: Cadence Garcia DO    Anesthesia: Monitor Anesthesia Care    Estimated Blood Loss (mL): Minimal    Complications: None    Specimens:   ID Type Source Tests Collected by Time Destination   1 : culture left buttock abscess Body Fluid Fluid CULTURE, ANAEROBIC, CULTURE, FUNGUS, GRAM STAIN, CULTURE, SURGICAL, CULTURE WITH SMEAR, ACID FAST BACILLIUS Juanito Motta MD 6/4/2025 1807        Implants:  * No implants in log *      Drains:   Open Drain 06/04/25 Left Buttock (Active)       Findings:  Infection Present At Time Of Surgery (PATOS) (choose all levels that have infection present):  - Superficial Infection (skin/subcutaneous) present as evidenced by pus, purulent fluid, and fluid consistent with infection  - Deep Infection (muscle/fascia) present as evidenced by pus, purulent fluid, and fluid consistent with infection  Other Findings: 15 cc of purulent fluid expressed from L gluteal abscess measuring 3 cm x 3 cm x 2.5 cm     Detailed Description of Procedure:   Patient was brought to the operating room.  Patient was positioned supine on the operating table.  Patient had anesthesia with monitored anesthesia care administered via the anesthesia record.  Patient was positioned in the lithotomy position.  All bony prominences were well-padded while in lithotomy.  The patient's perianal and surrounding area was then prepped and draped in usual sterile fashion with Betadine prep.  Appropriate a time was allowed for prep to dry prior to draping.  Surgical timeout was performed and agreed on by all present.  The patient's left gluteal abscess was identified and noted to measure 3 cm x 3

## 2025-06-05 PROBLEM — K61.0 PERIANAL ABSCESS: Status: ACTIVE | Noted: 2025-06-05

## 2025-06-05 PROCEDURE — 2580000003 HC RX 258

## 2025-06-05 PROCEDURE — 6370000000 HC RX 637 (ALT 250 FOR IP)

## 2025-06-05 PROCEDURE — 86360 T CELL ABSOLUTE COUNT/RATIO: CPT

## 2025-06-05 PROCEDURE — 6370000000 HC RX 637 (ALT 250 FOR IP): Performed by: INTERNAL MEDICINE

## 2025-06-05 PROCEDURE — 36415 COLL VENOUS BLD VENIPUNCTURE: CPT

## 2025-06-05 PROCEDURE — 6360000002 HC RX W HCPCS

## 2025-06-05 PROCEDURE — 96367 TX/PROPH/DG ADDL SEQ IV INF: CPT

## 2025-06-05 PROCEDURE — 2500000003 HC RX 250 WO HCPCS

## 2025-06-05 PROCEDURE — 96376 TX/PRO/DX INJ SAME DRUG ADON: CPT

## 2025-06-05 PROCEDURE — 86359 T CELLS TOTAL COUNT: CPT

## 2025-06-05 PROCEDURE — 99024 POSTOP FOLLOW-UP VISIT: CPT | Performed by: STUDENT IN AN ORGANIZED HEALTH CARE EDUCATION/TRAINING PROGRAM

## 2025-06-05 PROCEDURE — G0378 HOSPITAL OBSERVATION PER HR: HCPCS

## 2025-06-05 PROCEDURE — 96366 THER/PROPH/DIAG IV INF ADDON: CPT

## 2025-06-05 RX ORDER — LINEZOLID 600 MG/1
600 TABLET, FILM COATED ORAL EVERY 12 HOURS SCHEDULED
Status: DISCONTINUED | OUTPATIENT
Start: 2025-06-05 | End: 2025-06-06

## 2025-06-05 RX ADMIN — LINEZOLID 600 MG: 600 TABLET, FILM COATED ORAL at 20:29

## 2025-06-05 RX ADMIN — PIPERACILLIN AND TAZOBACTAM 3375 MG: 3; .375 INJECTION, POWDER, LYOPHILIZED, FOR SOLUTION INTRAVENOUS at 04:41

## 2025-06-05 RX ADMIN — BICTEGRAVIR SODIUM, EMTRICITABINE, AND TENOFOVIR ALAFENAMIDE FUMARATE 1 TABLET: 50; 200; 25 TABLET ORAL at 20:28

## 2025-06-05 RX ADMIN — SODIUM CHLORIDE, PRESERVATIVE FREE 10 ML: 5 INJECTION INTRAVENOUS at 20:29

## 2025-06-05 RX ADMIN — KETOROLAC TROMETHAMINE 30 MG: 30 INJECTION, SOLUTION INTRAMUSCULAR at 04:44

## 2025-06-05 RX ADMIN — PIPERACILLIN AND TAZOBACTAM 3375 MG: 3; .375 INJECTION, POWDER, LYOPHILIZED, FOR SOLUTION INTRAVENOUS at 12:44

## 2025-06-05 RX ADMIN — PIPERACILLIN AND TAZOBACTAM 3375 MG: 3; .375 INJECTION, POWDER, LYOPHILIZED, FOR SOLUTION INTRAVENOUS at 20:25

## 2025-06-05 RX ADMIN — KETOROLAC TROMETHAMINE 30 MG: 30 INJECTION, SOLUTION INTRAMUSCULAR at 20:28

## 2025-06-05 ASSESSMENT — PAIN DESCRIPTION - LOCATION
LOCATION: BUTTOCKS
LOCATION: PERINEUM
LOCATION: BUTTOCKS
LOCATION: BUTTOCKS

## 2025-06-05 ASSESSMENT — PAIN SCALES - GENERAL
PAINLEVEL_OUTOF10: 7
PAINLEVEL_OUTOF10: 5
PAINLEVEL_OUTOF10: 7
PAINLEVEL_OUTOF10: 5
PAINLEVEL_OUTOF10: 6
PAINLEVEL_OUTOF10: 7

## 2025-06-05 ASSESSMENT — PAIN DESCRIPTION - ORIENTATION
ORIENTATION: POSTERIOR

## 2025-06-05 ASSESSMENT — PAIN DESCRIPTION - DESCRIPTORS
DESCRIPTORS: ACHING;THROBBING
DESCRIPTORS: ACHING;THROBBING;TEARING
DESCRIPTORS: ACHING;TEARING;STABBING
DESCRIPTORS: ACHING;THROBBING;TEARING

## 2025-06-05 ASSESSMENT — PAIN - FUNCTIONAL ASSESSMENT
PAIN_FUNCTIONAL_ASSESSMENT: ACTIVITIES ARE NOT PREVENTED

## 2025-06-05 ASSESSMENT — PAIN SCALES - WONG BAKER
WONGBAKER_NUMERICALRESPONSE: HURTS A LITTLE BIT

## 2025-06-05 ASSESSMENT — PAIN DESCRIPTION - PAIN TYPE
TYPE: SURGICAL PAIN;ACUTE PAIN
TYPE: SURGICAL PAIN
TYPE: SURGICAL PAIN;ACUTE PAIN

## 2025-06-05 ASSESSMENT — PAIN DESCRIPTION - ONSET: ONSET: ON-GOING

## 2025-06-05 ASSESSMENT — PAIN DESCRIPTION - FREQUENCY: FREQUENCY: INTERMITTENT

## 2025-06-05 NOTE — PROGRESS NOTES
4 Eyes Skin Assessment     NAME:  Jatinder Antoine  YOB: 1990  MEDICAL RECORD NUMBER:  [id number]    The patient is being assessed for  Admission    I agree that at least one RN has performed a thorough Head to Toe Skin Assessment on the patient. ALL assessment sites listed below have been assessed.      Areas assessed by both nurses:    Head, Face, Ears, Shoulders, Back, Chest, Arms, Elbows, Hands, Sacrum. Buttock, Coccyx, Ischium, Legs. Feet and Heels, and Under Medical Devices         Does the Patient have a Wound? Yes wound(s) were present on assessment. LDA wound assessment was Initiated and completed by RN       Kenneth Prevention initiated by RN: Yes  Wound Care Orders initiated by RN: No    Pressure Injury (Stage 3,4, Unstageable, DTI, NWPT, and Complex wounds) if present, place Wound referral order by RN under : No    New Ostomies, if present place, Ostomy referral order under : No     Nurse 1 eSignature: Electronically signed by Chan Mchugh RN on 6/5/25 at 12:40 AM EDT    **SHARE this note so that the co-signing nurse can place an eSignature**    Nurse 2 eSignature: {Esignature:[id number]}

## 2025-06-05 NOTE — PLAN OF CARE
Problem: Discharge Planning  Goal: Discharge to home or other facility with appropriate resources  Outcome: Progressing  Flowsheets (Taken 6/4/2025 2015)  Discharge to home or other facility with appropriate resources: Identify barriers to discharge with patient and caregiver     Problem: Pain  Goal: Verbalizes/displays adequate comfort level or baseline comfort level  Outcome: Progressing  Flowsheets (Taken 6/4/2025 2015)  Verbalizes/displays adequate comfort level or baseline comfort level: Encourage patient to monitor pain and request assistance     Problem: Skin/Tissue Integrity  Goal: Skin integrity remains intact  Description: 1.  Monitor for areas of redness and/or skin breakdown2.  Assess vascular access sites hourly3.  Every 4-6 hours minimum:  Change oxygen saturation probe site4.  Every 4-6 hours:  If on nasal continuous positive airway pressure, respiratory therapy assess nares and determine need for appliance change or resting period  Outcome: Progressing     Problem: Safety - Adult  Goal: Free from fall injury  Outcome: Progressing

## 2025-06-05 NOTE — PROGRESS NOTES
TRIG 133 12/02/2020    HDL 44 12/02/2020    TSH 0.551 12/02/2020    LABA1C 5.6 12/02/2020       Radiology: REVIEWED DAILY    Assessment/Plan:    Perianal abscess  Surgery consulted from ER  S/p incision and drainage on 6/4/2025  Continue IV Zosyn  Infectious disease consulted    HIV  On Biktarvy  Infectious disease consulted      DVT Prophylaxis [x] Lovenox  []  Heparin [] DOAC [] PCDs [] Ambulation    GI Prophylaxis [] PPI  [] H2 Blocker   [] Carafate  [x] Diet/Tube Feeds   Level of care [] Med/Surg  [x] Intermediate  []  ICU   Diet ADULT DIET; Regular    Family contact [x]  N/A    [] At bedside  [] Phone call   Disposition Patient requires continued admission    MDM [] Low    [x] Moderate  []   High       Discharge Plan: Pending clearance from ID and general surgery    +++++++++++++++++++++++++++++++++++++++++++++++++  FRAN Martell - [facility]  [place], [place]  +++++++++++++++++++++++++++++++++++++++++++++++++  NOTE: This report was transcribed using voice recognition software. Every effort was made to ensure accuracy; however, inadvertent computerized transcription errors may be present.

## 2025-06-05 NOTE — CONSULTS
General Surgery   Consult Note      Patient's Name/Date of Birth: Jatinder Antoine / 1990    Date: June 4, 2025     PCP: No primary care provider on file.     Chief Complaint:   Chief Complaint   Patient presents with    Abscess     Patient states that he has an abscess on his left thigh, complaining of severe pain.  States he noticed it two days ago.     HPI:   Jatinder Antoine is a 34 y.o. male who presents for evaluation of rectal pain.  Patient states he has been having increasing pain near his rectum for the past several days.  Endorses some fevers and chills.  Denies any abnormal drainage from his rectum.  Patient does have a history of HIV which he takes Biktarvy for.  Patient states he has been undetectable since 2013.  He does endorse receptive anal intercourse within the past month.  Patient is afebrile hemodynamically stable.  Labs are unremarkable.  Imaging demonstrates a left perianal abscess measuring 3.2 x 2.4 cm.    Patient Active Problem List   Diagnosis    Depression, major, single episode    HIV infection ([facility])    Erectile dysfunction    Attention deficit hyperactivity disorder (ADHD), predominantly inattentive type    Gastroesophageal reflux disease    Tinea pedis of both feet    Anxiety    Arthritis    Learning disability    ACL (anterior cruciate ligament) tear    Hand pain, left    Pseudofolliculitis barbae    S/P ACL repair    Folliculitis    Balanitis    Acute pain of right knee    Substance induced mood disorder ([facility])    Polysubstance abuse ([facility])       Allergies   Allergen Reactions    Other/Food      Environmental allergies -- hair, grass, carpet - per pt        Past Medical History:   Diagnosis Date    Acid reflux disease     ACL (anterior cruciate ligament) tear     bilateral    Bilateral knee pain     Chronic back pain     Depression     HIV (human immunodeficiency virus infection) ([facility])     Severe episode of recurrent major depressive disorder, with psychotic features ([facility]) 
      ABG:  No results found for: \"THX4GEB\", \"BEART\", \"N9CGLYRE\", \"PHART\", \"THGBART\", \"PNZ1NWT\", \"PO2ART\", \"SVO6DLF\"    MICROBIOLOGY:    Blood cx - neg to date       Wound Culture -      .ABSCESS LEFT BUTTOCKSpecial RequestsSite: Body FluidDirect ExamGram stain performed by tissue touch prepNO EPITHELIAL CELLSMANY Polymorphonuclear leukocytesMODERATE GRAM POSITIVE COCCI Abnormal FEW GRAM POSITIVE COCCI IN PAIRS Abnormal FEW GRAM POSITIVE COCCI IN CHAINS Abnormal MODERATE GRAM NEGATIVE RODS Abnormal FEW GRAM POSITIVE COCCI IN CLUSTERS Abnormal CulturePENDING         Radiology :    CT scan of abdomen and pelvis -  Left perianal abscess measuring 3.2 x 2.4 cm in size with surrounding   edema and overlying skin thickening.   2. Moderate stool burden seen diffusely throughout the colon to suggest   clinical presentation of constipation.   3. Hemangioma identified within the right lobe of liver.         IMPRESSION:     Left perianal abscess s/p I & D -polymicrobial  HIV infection - controlled as of 4/2025       RECOMMENDATIONS:    Continue zosyn 3.375 grams IV q 8 hrs for now   Zyvox 600 mg po q 12 hrs   Await final cx - will d/c on oral abx   Continue Biktarvy 1 tab once a day     Thank you  Dr Pope for the consult

## 2025-06-05 NOTE — PROGRESS NOTES
GENERAL SURGERY  DAILY PROGRESS NOTE    Patient's Name/Date of Birth: Jatinder Antoine / 1990    Date: 2025     Chief Complaint   Patient presents with    Abscess     Patient states that he has an abscess on his left thigh, complaining of severe pain.  States he noticed it two days ago.      Subjective:  No issues overnight. Pain controlled.     Objective:  Last 24Hrs  Temp  Av.9 °F (36.6 °C)  Min: 97 °F (36.1 °C)  Max: 99 °F (37.2 °C)  Resp  Avg: 15.8  Min: 13  Max: 18  Pulse  Av  Min: 58  Max: 88  Systolic (24hrs), Av , Min:99 , Max:131     Diastolic (24hrs), Av, Min:64, Max:92    SpO2  Av %  Min: 97 %  Max: 100 %    No intake/output data recorded.    General: No acute distress, alert and oriented x4  Cardiovascular: Warm throughout, no edema  Respiratory: No increased work of breathing  Abdomen: Soft, nontender, nondistended. No rebound or guarding.  Skin: Left perianal open wound with penrose drain in place, serosanguinous output  Extremities: Atraumatic, no focal motor deficits, no open wounds    CBC  Recent Labs     25  1623 25  0604   WBC 11.3 10.6   RBC 4.77 4.60   HGB 15.0 14.0   HCT 43.8 41.8   MCV 91.8 90.9   MCH 31.4 30.4   MCHC 34.2 33.5   RDW 13.6 13.7    270   MPV 9.0 8.7     CMP  Recent Labs     25  1623 25  0604    137   K 3.9 3.9   CL 99 103   CO2 26 27   BUN 9 8   CREATININE 1.3* 1.2   GLUCOSE 83 110*   CALCIUM 9.2 8.7   BILITOT 0.3 0.4   ALKPHOS 86 74   AST 17 17   ALT 22 16     Assessment/Plan:    Patient Active Problem List   Diagnosis    Depression, major, single episode    HIV infection ([facility])    Erectile dysfunction    Attention deficit hyperactivity disorder (ADHD), predominantly inattentive type    Gastroesophageal reflux disease    Tinea pedis of both feet    Anxiety    Arthritis    Learning disability    ACL (anterior cruciate ligament) tear    Hand pain, left    Pseudofolliculitis barbae    S/P ACL repair        GLUCOSE 110 06/04/2025 06:04 AM    CALCIUM 8.7 06/04/2025 06:04 AM    BILITOT 0.4 06/04/2025 06:04 AM    ALKPHOS 74 06/04/2025 06:04 AM    AST 17 06/04/2025 06:04 AM    ALT 16 06/04/2025 06:04 AM     Sukhdev Altman MD   Trauma/Critical care

## 2025-06-06 VITALS
TEMPERATURE: 98.1 F | HEART RATE: 62 BPM | SYSTOLIC BLOOD PRESSURE: 104 MMHG | BODY MASS INDEX: 25.9 KG/M2 | RESPIRATION RATE: 16 BRPM | WEIGHT: 185 LBS | HEIGHT: 71 IN | OXYGEN SATURATION: 98 % | DIASTOLIC BLOOD PRESSURE: 75 MMHG

## 2025-06-06 LAB
ANION GAP SERPL CALCULATED.3IONS-SCNC: 9 MMOL/L (ref 7–16)
BUN SERPL-MCNC: 11 MG/DL (ref 6–20)
CALCIUM SERPL-MCNC: 8.8 MG/DL (ref 8.6–10)
CHLORIDE SERPL-SCNC: 104 MMOL/L (ref 98–107)
CO2 SERPL-SCNC: 26 MMOL/L (ref 22–29)
CREAT SERPL-MCNC: 1.2 MG/DL (ref 0.7–1.2)
GFR, ESTIMATED: 83 ML/MIN/1.73M2
GLUCOSE SERPL-MCNC: 91 MG/DL (ref 74–99)
HIV1 RNA # SERPL NAA+PROBE: NOT DETECTED {COPIES}/ML
POTASSIUM SERPL-SCNC: 4.1 MMOL/L (ref 3.5–5.1)
SODIUM SERPL-SCNC: 139 MMOL/L (ref 136–145)
SPECIMEN SOURCE: NORMAL

## 2025-06-06 PROCEDURE — 6370000000 HC RX 637 (ALT 250 FOR IP): Performed by: INTERNAL MEDICINE

## 2025-06-06 PROCEDURE — 36415 COLL VENOUS BLD VENIPUNCTURE: CPT

## 2025-06-06 PROCEDURE — G0378 HOSPITAL OBSERVATION PER HR: HCPCS

## 2025-06-06 PROCEDURE — 6360000002 HC RX W HCPCS

## 2025-06-06 PROCEDURE — 2500000003 HC RX 250 WO HCPCS

## 2025-06-06 PROCEDURE — 80048 BASIC METABOLIC PNL TOTAL CA: CPT

## 2025-06-06 PROCEDURE — 96376 TX/PRO/DX INJ SAME DRUG ADON: CPT

## 2025-06-06 PROCEDURE — 96366 THER/PROPH/DIAG IV INF ADDON: CPT

## 2025-06-06 PROCEDURE — 2580000003 HC RX 258

## 2025-06-06 RX ORDER — AMOXICILLIN AND CLAVULANATE POTASSIUM 562.5; 437.5; 62.5 MG/1; MG/1; MG/1
2 TABLET, MULTILAYER, EXTENDED RELEASE ORAL 2 TIMES DAILY
Qty: 28 TABLET | Refills: 0 | Status: SHIPPED | OUTPATIENT
Start: 2025-06-06 | End: 2025-06-13

## 2025-06-06 RX ORDER — AMOXICILLIN AND CLAVULANATE POTASSIUM 562.5; 437.5; 62.5 MG/1; MG/1; MG/1
2 TABLET, MULTILAYER, EXTENDED RELEASE ORAL EVERY 12 HOURS SCHEDULED
Status: DISCONTINUED | OUTPATIENT
Start: 2025-06-06 | End: 2025-06-06 | Stop reason: HOSPADM

## 2025-06-06 RX ORDER — HYDROCODONE BITARTRATE AND ACETAMINOPHEN 10; 325 MG/1; MG/1
1 TABLET ORAL EVERY 6 HOURS PRN
Qty: 12 TABLET | Refills: 0 | Status: SHIPPED | OUTPATIENT
Start: 2025-06-06 | End: 2025-06-09

## 2025-06-06 RX ADMIN — LINEZOLID 600 MG: 600 TABLET, FILM COATED ORAL at 08:53

## 2025-06-06 RX ADMIN — SODIUM CHLORIDE, PRESERVATIVE FREE 10 ML: 5 INJECTION INTRAVENOUS at 09:39

## 2025-06-06 RX ADMIN — SODIUM CHLORIDE, PRESERVATIVE FREE 10 ML: 5 INJECTION INTRAVENOUS at 08:53

## 2025-06-06 RX ADMIN — FENTANYL CITRATE 50 MCG: 50 INJECTION INTRAMUSCULAR; INTRAVENOUS at 09:38

## 2025-06-06 RX ADMIN — PIPERACILLIN AND TAZOBACTAM 3375 MG: 3; .375 INJECTION, POWDER, LYOPHILIZED, FOR SOLUTION INTRAVENOUS at 04:08

## 2025-06-06 ASSESSMENT — PAIN - FUNCTIONAL ASSESSMENT
PAIN_FUNCTIONAL_ASSESSMENT: PREVENTS OR INTERFERES SOME ACTIVE ACTIVITIES AND ADLS
PAIN_FUNCTIONAL_ASSESSMENT: PREVENTS OR INTERFERES WITH MANY ACTIVE NOT PASSIVE ACTIVITIES

## 2025-06-06 ASSESSMENT — PAIN DESCRIPTION - PAIN TYPE
TYPE: SURGICAL PAIN
TYPE: SURGICAL PAIN

## 2025-06-06 ASSESSMENT — PAIN SCALES - GENERAL
PAINLEVEL_OUTOF10: 5
PAINLEVEL_OUTOF10: 7

## 2025-06-06 ASSESSMENT — PAIN DESCRIPTION - ORIENTATION
ORIENTATION: RIGHT;LEFT
ORIENTATION: RIGHT;LEFT

## 2025-06-06 ASSESSMENT — PAIN DESCRIPTION - DESCRIPTORS
DESCRIPTORS: ACHING;DISCOMFORT;THROBBING
DESCRIPTORS: ACHING;DISCOMFORT;THROBBING

## 2025-06-06 ASSESSMENT — PAIN DESCRIPTION - FREQUENCY
FREQUENCY: INTERMITTENT
FREQUENCY: INTERMITTENT

## 2025-06-06 ASSESSMENT — PAIN DESCRIPTION - LOCATION
LOCATION: BUTTOCKS
LOCATION: BUTTOCKS

## 2025-06-06 NOTE — PROGRESS NOTES
Hospitalist Progress Note      Synopsis:   Jatinder presents to the ER with abscess. He states that he noticed swelling and pain about 2 days ago. He states that this started as a small pimple a day ago and has become larger over the past day. He does have a history of HIV and takes his prescribed Biktarvy. General surgery consulted from the ER. Surgery desires medical admission due to HIV.     Hospital day 0     Subjective:  Stable overnight.  No issues reported.   Patient seen and examined at bedside, denies fever aches chills, states he wants to be discharged.  Records reviewed.       Temp (24hrs), Av °F (36.7 °C), Min:97.8 °F (36.6 °C), Max:98.1 °F (36.7 °C)    DIET: ADULT DIET; Regular  CODE: Full Code    Intake/Output Summary (Last 24 hours) at 2025 0832  Last data filed at 2025 1804  Gross per 24 hour   Intake 960 ml   Output 1100 ml   Net -140 ml       Review of Systems:    All bolded are positive; please see HPI  General:  Fever, chills, diaphoresis, fatigue, malaise, night sweats, weight loss  Psychological:  Anxiety, disorientation, hallucinations.  ENT:  Epistaxis, headaches, vertigo, visual changes.  Cardiovascular:  Chest pain, irregular heartbeats, palpitations, paroxysmal nocturnal dyspnea.  Respiratory:  Shortness of breath, coughing, sputum production, hemoptysis, wheezing, orthopnea.  Gastrointestinal:  Nausea, vomiting, diarrhea, heartburn, constipation, abdominal pain, hematemesis, hematochezia, melena, acholic stools  Genito-Urinary:  Dysuria, urgency, frequency, hematuria  Musculoskeletal:  Joint pain, joint stiffness, joint swelling, muscle pain  Neurology:  Headache, focal neurological deficits, weakness, numbness, paresthesia  Derm:  Rashes, ulcers, excoriations, bruising  Extremities:  Decreased ROM, peripheral edema, mottling    Objective:    /75   Pulse 62   Temp 98.1 °F (36.7 °C) (Oral)   Resp 16   Ht 1.803 m (5' 11\")   Wt 83.9 kg (185 lb)   SpO2 98%   BMI

## 2025-06-06 NOTE — DISCHARGE SUMMARY
Hospitalist Discharge Summary    Patient ID: Jatinder Antoine   Patient : 1990  Patient's PCP: No primary care provider on file.    Admit Date: 2025   Admitting Physician: Hemalatha Domínguez DO    Discharge Date:  2025   Discharge Physician: FRAN Dia - CNP   Discharge Condition: Stable  Discharge Disposition: Home      Hospital course in brief:  (Please refer to daily progress notes for a comprehensive review of the hospitalization by requesting medical records)  Jatinder is a very pleasant male who presented to the ER with abscess. He states that he noticed swelling and pain about 2 days ago. He states that this started as a small pimple a day ago and has become larger over the past day. He does have a history of HIV and takes his prescribed Biktarvy. General surgery consulted from the ER. Surgery desires medical admission due to HIV.  He is s/p I&D of left perianal abscess on 2025, he was on IV Zosyn and Zyvox ID transitioned patient to oral Augmentin and he is stable for discharge from medical perspective.        Consults:   IP CONSULT TO GENERAL SURGERY  IP CONSULT TO INFECTIOUS DISEASES    Discharge Diagnoses:    Left perianal abscess s/p I & D -polymicrobial  HIV infection - controlled as of 2025     Discharge Instructions / Follow up:    Future Appointments   Date Time Provider Department Center   2025 10:00 AM [facility]       The patient's condition is stable.  At this time the patient is without objective evidence of an acute process requiring continuing hospitalization or inpatient management.  They are stable for discharge with outpatient follow-up.     I have spoken with the patient and discussed the results of the current hospitalization, in addition to providing specific details for the plan of care and counseling regarding the diagnosis and prognosis.  The plan has been discussed in detail and they are aware of the specific  parenchyma.  Cyst identified within the right kidney.  No routine follow-up is suggested.  No stones or distension seen in the renal collecting system. GI/Bowel: The stomach is unremarkable in appearance.  No wall thickening. The small bowel is within normal limits.  The appendix is normal.  Moderate stool burden seen diffusely throughout the colon to suggest clinical presentation of constipation. Pelvis: The bladder is distended.  No significant wall thickening.  The prostate is unremarkable. Peritoneum/Retroperitoneum: Scattered small lymph nodes identified in the retroperitoneum however no bulky adenopathy is identified.  No pelvic adenopathy.  No free fluid or free air.  No abnormal mass or fluid collections identified. Bones/Soft Tissues: Bony structures reveal no evidence of acute or aggressive osseous abnormality.  No ventral abdominal wall mass or defect.  There is evidence of a left perianal abscess measuring 3.2 x 2.4 cm in size with surrounding edema and overlying skin thickening.     1. Left perianal abscess measuring 3.2 x 2.4 cm in size with surrounding edema and overlying skin thickening. 2. Moderate stool burden seen diffusely throughout the colon to suggest clinical presentation of constipation. 3. Hemangioma identified within the right lobe of liver.       Discharge Medications:      Medication List        START taking these medications      amoxicillin-clavulanate 1000-62.5 MG per extended release tablet  Commonly known as: AUGMENTIN XR  Take 2 tablets by mouth 2 times daily for 7 days            CONTINUE taking these medications      Biktarvy -25 MG Tabs per tablet  Generic drug: bictegravir-emtricitab-tenofovir alafenamide            STOP taking these medications      doxycycline hyclate 100 MG tablet  Commonly known as: VIBRA-TABS     fluticasone 50 MCG/ACT nasal spray  Commonly known as: FLONASE     ketoconazole 2 % cream  Commonly known as: NIZORAL     ketoconazole 2 % shampoo  Commonly

## 2025-06-06 NOTE — PLAN OF CARE
Problem: Discharge Planning  Goal: Discharge to home or other facility with appropriate resources  6/6/2025 1816 by Carly Diaz, RN  Outcome: Completed  6/6/2025 1532 by Carly Diaz, RN  Outcome: Progressing  6/6/2025 1012 by Carly Diaz, RN  Outcome: Progressing

## 2025-06-06 NOTE — PLAN OF CARE
Problem: Discharge Planning  Goal: Discharge to home or other facility with appropriate resources  6/6/2025 1012 by Carly Diaz, RN  Outcome: Progressing  6/5/2025 2309 by Chan Mchugh, RN  Outcome: Progressing  Flowsheets (Taken 6/5/2025 2015)  Discharge to home or other facility with appropriate resources: Identify barriers to discharge with patient and caregiver

## 2025-06-06 NOTE — PLAN OF CARE
Problem: Discharge Planning  Goal: Discharge to home or other facility with appropriate resources  6/6/2025 1532 by Carly Diaz, RN  Outcome: Progressing  6/6/2025 1012 by Carly Diaz, RN  Outcome: Progressing

## 2025-06-06 NOTE — PLAN OF CARE
Problem: Discharge Planning  Goal: Discharge to home or other facility with appropriate resources  Outcome: Progressing  Flowsheets (Taken 6/5/2025 2015)  Discharge to home or other facility with appropriate resources: Identify barriers to discharge with patient and caregiver     Problem: Pain  Goal: Verbalizes/displays adequate comfort level or baseline comfort level  Outcome: Progressing  Flowsheets (Taken 6/5/2025 2015)  Verbalizes/displays adequate comfort level or baseline comfort level: Encourage patient to monitor pain and request assistance     Problem: Skin/Tissue Integrity  Goal: Skin integrity remains intact  Description: 1.  Monitor for areas of redness and/or skin breakdown2.  Assess vascular access sites hourly3.  Every 4-6 hours minimum:  Change oxygen saturation probe site4.  Every 4-6 hours:  If on nasal continuous positive airway pressure, respiratory therapy assess nares and determine need for appliance change or resting period  Outcome: Progressing  Flowsheets (Taken 6/5/2025 2015)  Skin Integrity Remains Intact: Monitor for areas of redness and/or skin breakdown     Problem: Safety - Adult  Goal: Free from fall injury  Outcome: Progressing

## 2025-06-06 NOTE — PROGRESS NOTES
Department of Internal Medicine  Infectious Diseases  Progress  Note      C/C :   HIV , left buttock abscess       Pt is awake and alert  Denies fever or chills  Pain in the buttock - improving   Afebrile       Current Facility-Administered Medications   Medication Dose Route Frequency Provider Last Rate Last Admin    linezolid (ZYVOX) tablet 600 mg  600 mg Oral 2 times per day Uriel Victoria MD   600 mg at 06/06/25 0853    ondansetron (ZOFRAN) injection 4 mg  4 mg IntraVENous Q6H PRN Cadence Garcia DO        piperacillin-tazobactam (ZOSYN) 3,375 mg in sodium chloride 0.9 % 50 mL IVPB ([id number])  3,375 mg IntraVENous Q8H Cadence Garcia DO   Stopped at 06/06/25 0844    benzocaine-menthol (CEPACOL SORE THROAT) lozenge 1 lozenge  1 lozenge Oral Q2H PRN Cadence Garcia DO        benzonatate (TESSALON) capsule 100 mg  100 mg Oral TID PRN Cadence Garcia DO        calcium carbonate (TUMS) chewable tablet 500 mg  500 mg Oral TID PRN Cadence Garcia DO        hydrALAZINE (APRESOLINE) injection 10 mg  10 mg IntraVENous Q6H PRN Cadence Garcia DO        melatonin tablet 3 mg  3 mg Oral Nightly PRN Cadence Garcia DO        sodium chloride (OCEAN, BABY AYR) 0.65 % nasal spray 1 spray  1 spray Each Nostril PRN Cadence Garcia DO        sodium chloride flush 0.9 % injection 5-40 mL  5-40 mL IntraVENous 2 times per day Cadence Garcia DO   10 mL at 06/06/25 0853    sodium chloride flush 0.9 % injection 5-40 mL  5-40 mL IntraVENous PRN Cadence Garcia DO   10 mL at 06/06/25 0939    0.9 % sodium chloride infusion   IntraVENous PRN Cadence Garcia DO        potassium chloride (KLOR-CON M) extended release tablet 40 mEq  40 mEq Oral PRN Cadence Garcia DO        Or    potassium bicarb-citric acid (EFFER-K) effervescent tablet 40 mEq  40 mEq Oral PRN Cadence Garcia DO        Or    potassium chloride 10 mEq/100 mL IVPB (Peripheral Line)  10 mEq IntraVENous PRN Cadence Garcia DO        magnesium sulfate 2000 mg in 50 mL IVPB premix  2,000 mg

## 2025-06-06 NOTE — CARE COORDINATION
Care Coordination  Met with the patient at bedside to discuss transition care planning. The patient is homeless currently but will be going to stay at a friends house upon discharge. He was independent prior to admission but is asking for a cane upon discharge. He has no primary care physician but wants a referral made. Referral made to Libra Shipman to make a new PCP appointment for the patient. His pharmacy is [facility]. He came in with an abscess on his left thigh and complaints of severe pain. The patient received Iv Rocephin and Iv Flagyl. Impression perianal abscess. Surgury was consulted for I.d. He is on Iv Zosyn as well. His plan is to return home on oral Augmentin XR 2 gm po q12 hrs x 1 week. Cannot order a cane as he has no qualifying diagnosis.      Case Management Assessment  Initial Evaluation    Date/Time of Evaluation: 6/6/2025 12:48 PM  Assessment Completed by: Soco Rose RN    If patient is discharged prior to next notation, then this note serves as note for discharge by case management.    Patient Name: Jatinder Antoine                   YOB: 1990  Diagnosis: Perianal abscess [K61.0]  Abscess [L02.91]                   Date / Time: 6/4/2025  9:27 AM    Patient Admission Status: Observation   Readmission Risk (Low < 19, Mod (19-27), High > 27): No data recorded  Current PCP: No primary care provider on file.  PCP verified by ? No (he does not have one)    Chart Reviewed: Yes      History Provided by: Patient  Patient Orientation: Alert and Oriented    Patient Cognition: Alert    Hospitalization in the last 30 days (Readmission):  No    If yes, Readmission Assessment in  Navigator will be completed.    Advance Directives:      Code Status: Full Code   Patient's Primary Decision Maker is: Legal Next of Kin      Discharge Planning:    Patient lives with:   Type of Home:    Primary Care Giver: Self  Patient Support Systems include: Parent   Current Financial resources:   [phone]

## 2025-06-06 NOTE — DISCHARGE INSTRUCTIONS
Drain Care  What is the drain for?  Your drain is to stop fluid from building up under the skin.  It also helps your incision to heal.  Your doctor will take the drain out when there is less and less fluid coming out.    What supplies do I need?  4” x 4” gauze  Tape   Antibiotic ointment (available over the counter)    Drain care:  Wash you hands before you change the dressing or empty the drain. This is important to prevent infection.  Change the dressing to the drain tube site daily.  Apply a small amount of antibiotic ointment to the skin at the drain tube site with each dressing change.    How much drainage should there be?  The amount of drainage may vary from day to day.  It should be less each day.  If you increase your activity, it may increase the amount of drainage, temporarily.    What color should the drainage be?  The color will vary.  It may go from bright red to pink, then to clear yellow.    May I shower?  Yes, It may help to secure the drain to an old cloth/robe belt that is around your waist.     When should I call the doctor?  Call your doctor if:  You have an elevated temperature (greater than 101 or higher)  The drainage increases or stops suddenly  The drain stitches come loose or break, or if the drain comes out  The area on your skin around the drain gets red, swollen, or painful  The fluid coming out of the drain changes (has pus in it, becomes bright red, or has a bad smell).

## 2025-06-06 NOTE — FLOWSHEET NOTE
Reviewed dressing changes with pt. Supplies sent. Pt also asking for pain meds.     Messaged primary to see if they can send home. Sent norco to pharmacy.     Discharged papers given, questions answered.

## 2025-06-07 LAB
MICROORGANISM SPEC CULT: NORMAL
MICROORGANISM SPEC CULT: NORMAL
MICROORGANISM/AGENT SPEC: NORMAL
MICROORGANISM/AGENT SPEC: NORMAL
SERVICE CMNT-IMP: NORMAL
SERVICE CMNT-IMP: NORMAL
SPECIMEN DESCRIPTION: NORMAL
SPECIMEN DESCRIPTION: NORMAL

## 2025-06-08 LAB
ANNOTATION COMMENT IMP: ABNORMAL
CD3 CELLS # BLD: 1243 CELLS/UL (ref 570–2400)
CD3+CD4+ CELLS # BLD: 487 CELLS/UL (ref 430–1800)
CD3+CD4+ CELLS/CD3+CD8+ CLL BLD: 0.65 RATIO (ref 0.8–3.9)
CD3+CD8+ CELLS # BLD: 755 CELLS/UL (ref 210–1200)
MICROORGANISM SPEC CULT: ABNORMAL
MICROORGANISM SPEC CULT: NORMAL
MICROORGANISM/AGENT SPEC: ABNORMAL
SERVICE CMNT-IMP: ABNORMAL
SERVICE CMNT-IMP: ABNORMAL
SERVICE CMNT-IMP: NORMAL
SPECIMEN DESCRIPTION: ABNORMAL
SPECIMEN DESCRIPTION: ABNORMAL
SPECIMEN DESCRIPTION: NORMAL

## 2025-06-09 LAB
MICROORGANISM SPEC CULT: NORMAL
MICROORGANISM SPEC CULT: NORMAL
SERVICE CMNT-IMP: NORMAL
SERVICE CMNT-IMP: NORMAL
SPECIMEN DESCRIPTION: NORMAL
SPECIMEN DESCRIPTION: NORMAL

## 2025-06-10 LAB
SEND OUT REPORT: NORMAL
TEST NAME: NORMAL

## 2025-06-13 LAB
MICROORGANISM SPEC CULT: NORMAL
MICROORGANISM/AGENT SPEC: NORMAL
SERVICE CMNT-IMP: NORMAL
SPECIMEN DESCRIPTION: NORMAL

## 2025-06-17 LAB
SEND OUT REPORT: NORMAL
TEST NAME: NORMAL

## 2025-07-18 LAB
MICROORGANISM SPEC CULT: NORMAL
MICROORGANISM/AGENT SPEC: NORMAL
SERVICE CMNT-IMP: NORMAL
SPECIMEN DESCRIPTION: NORMAL

## (undated) DEVICE — CHLORAPREP 26ML ORANGE

## (undated) DEVICE — MARKER,SKIN,WI/RULER AND LABELS: Brand: MEDLINE

## (undated) DEVICE — DRILL SURG FLIPCUTTER III

## (undated) DEVICE — AGGRESSIVE PLUS, ANGLED CUTTER: Brand: FORMULA

## (undated) DEVICE — TOWEL,OR,DSP,ST,BLUE,STD,6/PK,12PK/CS: Brand: MEDLINE

## (undated) DEVICE — NEEDLE HYPO 25GA L1.5IN BLU POLYPR HUB S STL REG BVL STR

## (undated) DEVICE — MASTISOL ADHESIVE LIQ 2/3ML

## (undated) DEVICE — SYRINGE MED 10ML TRNSLUC BRL PLUNG BLK MRK POLYPR CTRL

## (undated) DEVICE — SKIN PREP TRAY 4 COMPARTM TRAY: Brand: MEDLINE INDUSTRIES, INC.

## (undated) DEVICE — [AGGRESSIVE PLUS CUTTER, ARTHROSCOPIC SHAVER BLADE,  DO NOT RESTERILIZE,  DO NOT USE IF PACKAGE IS DAMAGED,  KEEP DRY,  KEEP AWAY FROM SUNLIGHT]: Brand: FORMULA

## (undated) DEVICE — GOWN,SIRUS,NONRNF,SETINSLV,XL,20/CS: Brand: MEDLINE

## (undated) DEVICE — BNDG,ELSTC,MATRIX,STRL,6"X5YD,LF,HOOK&LP: Brand: MEDLINE

## (undated) DEVICE — SYRINGE, LUER LOCK, 5ML: Brand: MEDLINE

## (undated) DEVICE — NEEDLE FLTR 18GA L1.5IN MEM THK5UM BLNT DISP

## (undated) DEVICE — DRAPE,TOP,102X53,STERILE: Brand: MEDLINE

## (undated) DEVICE — INSTRUMENT SYSTEM 7 BATTERY REUSABLE

## (undated) DEVICE — DRAPE,REIN 53X77,STERILE: Brand: MEDLINE

## (undated) DEVICE — GLOVE SURG SZ 8 CRM LTX FREE POLYISOPRENE POLYMER BEAD ANTI

## (undated) DEVICE — MAT SURG W36XL56IN GRN FOAM ANTIFATIGUE NONSLIP DRISAFE

## (undated) DEVICE — TRAY ARTHROSCOPIC BITERS REUSABLE

## (undated) DEVICE — BANDAGE COMPR W6INXL12FT SMOOTH FOR LIMB EXSANG ESMARCH

## (undated) DEVICE — MEDIBRIEF MESH BRIEF - SIZE X-LARGE - SIZE COLOR CODE: GREEN - 2EA/BG, 50BG/CS: Brand: MEDIBRIEF

## (undated) DEVICE — SOLUTION IV IRRIG LACTATED RINGERS 3000ML 2B7487

## (undated) DEVICE — TRAY DRILL SYSTEM 4 REUSABLE

## (undated) DEVICE — SOLUTION IRRIG 1000ML 0.9% SOD CHL USP POUR PLAS BTL

## (undated) DEVICE — DOUBLE BASIN SET: Brand: MEDLINE INDUSTRIES, INC.

## (undated) DEVICE — TUBING, SUCTION, 9/32" X 10', STRAIGHT: Brand: MEDLINE

## (undated) DEVICE — TRAP,MUCUS SPECIMEN,40CC: Brand: MEDLINE

## (undated) DEVICE — TRAY ATRYKER ARTHROSCOPIC KNEE INSTR REUSABLE

## (undated) DEVICE — GLOVE ORANGE PI 7 1/2   MSG9075

## (undated) DEVICE — INSTRUMENT STRYKER SHAVER REUSABLE

## (undated) DEVICE — ZIMMER® STERILE DISPOSABLE TOURNIQUET CUFF WITH PLC, DUAL PORT, SINGLE BLADDER, 34 IN. (86 CM)

## (undated) DEVICE — KIT SURG W7XL11IN 2 PKT UNTREATED NA

## (undated) DEVICE — PACK PROCEDURE SURG GEN CUST

## (undated) DEVICE — TOTAL KNEE PK

## (undated) DEVICE — SUTURE TIGERSTICK TIGERWIRE SZ 2-0 L50IN NONABSORBABLE AR7209T

## (undated) DEVICE — COVER,MAYO STAND,STERILE: Brand: MEDLINE

## (undated) DEVICE — DRESSING,GAUZE,XEROFORM,CURAD,1"X8",ST: Brand: CURAD

## (undated) DEVICE — SYRINGE 20ML LL S/C 50

## (undated) DEVICE — 4-PORT MANIFOLD: Brand: NEPTUNE 2

## (undated) DEVICE — PAD POS ARTHSCP DISP PIVOTPOST

## (undated) DEVICE — ELECTRODE PT RET AD L9FT HI MOIST COND ADH HYDRGEL CORDED

## (undated) DEVICE — SUPER TURBOVAC 90 INTEGRATED CABLE WAND ICW: Brand: COBLATION

## (undated) DEVICE — Z DISCONTINUED USE 2275686 GLOVE SURG SZ 8 L12IN FNGR THK13MIL WHT ISOLEX POLYISOPRENE

## (undated) DEVICE — TRAY ARTHREX NEW SHOULDER INSTR REUSABLE

## (undated) DEVICE — GAUZE,SPONGE,AVANT,4"X4",4PLY,STRL,10/TR: Brand: MEDLINE

## (undated) DEVICE — SOLUTION IV IRRIG POUR BRL 0.9% SODIUM CHL 2F7124

## (undated) DEVICE — SHOECOVER ANTI-SKID: Brand: CARDINAL HEALTH

## (undated) DEVICE — SYRINGE MED 30ML STD CLR PLAS LUERLOCK TIP N CTRL DISP

## (undated) DEVICE — COVER HNDL LT DISP

## (undated) DEVICE — BLADE,STAINLESS-STEEL,15,STRL,DISPOSABLE: Brand: MEDLINE

## (undated) DEVICE — SYRINGE, LUER LOCK, 10ML: Brand: MEDLINE

## (undated) DEVICE — JELLY,LUBE,STERILE,FLIP TOP,TUBE,4-OZ: Brand: MEDLINE

## (undated) DEVICE — PADDING CAST W6INXL4YD COT LO LINTING WYTEX

## (undated) DEVICE — INSTRUMENT SYSTEM 4 BATTERY REUSABLE

## (undated) DEVICE — 3M™ STERI-DRAPE™ U-DRAPE 1015: Brand: STERI-DRAPE™

## (undated) DEVICE — NEEDLE HYPO 18GA L1.5IN PNK POLYPR HUB S STL REG BVL STR

## (undated) DEVICE — STANDARD HYPODERMIC NEEDLE,POLYPROPYLENE HUB: Brand: MONOJECT

## (undated) DEVICE — SYRINGE MED 50ML LUERLOCK TIP

## (undated) DEVICE — CAMERA STRYKER 1488

## (undated) DEVICE — TAPE,WATERPROOF,CURAD,2"X10YD,LF,72/CS: Brand: CURAD

## (undated) DEVICE — TUBING PMP L16FT MAIN DISP FOR AR-6400 AR-6475

## (undated) DEVICE — SUTURE FIBERWIRE FIBERSTICK SZ 2-0 L50/12IN NONABSORBABLE AR7209

## (undated) DEVICE — SET ORTHO STD STORTSTD1

## (undated) DEVICE — SPONGE LAP W18XL18IN WHT COT 4 PLY FLD STRUNG RADPQ DISP ST

## (undated) DEVICE — BLADE STRPR L10MM KNEE FOR QUAD TEND GRFT CUT GUID DISP

## (undated) DEVICE — DRAIN SURG PENROSE 0.25X12 IN CLOSED WND DRAINAGE PREM SIL

## (undated) DEVICE — NEEDLE SPNL L3.5IN PNK HUB S STL REG WALL FIT STYL W/ QNCKE

## (undated) DEVICE — UNIVERSAL DRAPE: Brand: MEDLINE INDUSTRIES, INC.

## (undated) DEVICE — GAUZE,SPONGE,4"X4",8PLY,STRL,LF,10/TRAY: Brand: MEDLINE